# Patient Record
Sex: MALE | Race: WHITE | Employment: OTHER | URBAN - NONMETROPOLITAN AREA
[De-identification: names, ages, dates, MRNs, and addresses within clinical notes are randomized per-mention and may not be internally consistent; named-entity substitution may affect disease eponyms.]

---

## 2018-10-11 LAB
BILIRUBIN URINE: ABNORMAL MG/DL
BLOOD, URINE: ABNORMAL
CLARITY: ABNORMAL
COLOR: ABNORMAL
GLUCOSE URINE: NEGATIVE
KETONES, URINE: ABNORMAL
LEUKOCYTE ESTERASE, URINE: ABNORMAL
NITRITE, URINE: NEGATIVE
PH UA: 6 (ref 4.5–8)
PROTEIN UA: ABNORMAL
SPECIFIC GRAVITY UA: 1.02 (ref 1–1.03)
UROBILINOGEN, URINE: ABNORMAL

## 2018-10-29 LAB
BILIRUBIN URINE: ABNORMAL MG/DL
BLOOD, URINE: ABNORMAL
CLARITY: ABNORMAL
COLOR: YELLOW
GLUCOSE URINE: NEGATIVE
KETONES, URINE: NEGATIVE
LEUKOCYTE ESTERASE, URINE: ABNORMAL
NITRITE, URINE: NEGATIVE
PH UA: 6.5 (ref 4.5–8)
PROTEIN UA: ABNORMAL
SPECIFIC GRAVITY UA: 1.02 (ref 1–1.03)
UROBILINOGEN, URINE: NORMAL

## 2018-11-01 LAB
BILIRUBIN URINE: NORMAL MG/DL
BLOOD, URINE: NORMAL
CLARITY: NORMAL
COLOR: YELLOW
GLUCOSE URINE: NEGATIVE
KETONES, URINE: NEGATIVE
LEUKOCYTE ESTERASE, URINE: NORMAL
NITRITE, URINE: NEGATIVE
PH UA: 5 (ref 4.5–8)
PROTEIN UA: NORMAL
SPECIFIC GRAVITY UA: 1.01 (ref 1–1.03)
UROBILINOGEN, URINE: NORMAL

## 2018-11-15 LAB
BILIRUBIN URINE: ABNORMAL MG/DL
BLOOD, URINE: ABNORMAL
CLARITY: ABNORMAL
COLOR: YELLOW
GLUCOSE URINE: NEGATIVE
KETONES, URINE: NEGATIVE
LEUKOCYTE ESTERASE, URINE: ABNORMAL
NITRITE, URINE: NEGATIVE
PH UA: 7 (ref 4.5–8)
PROTEIN UA: ABNORMAL
SPECIFIC GRAVITY UA: 1.02 (ref 1–1.03)
UROBILINOGEN, URINE: NORMAL

## 2018-12-06 ENCOUNTER — HOSPITAL ENCOUNTER (EMERGENCY)
Age: 73
Discharge: HOME | End: 2018-12-06
Payer: MEDICARE

## 2018-12-06 VITALS
RESPIRATION RATE: 18 BRPM | OXYGEN SATURATION: 95 % | SYSTOLIC BLOOD PRESSURE: 151 MMHG | HEART RATE: 97 BPM | DIASTOLIC BLOOD PRESSURE: 79 MMHG | TEMPERATURE: 98.7 F

## 2018-12-06 DIAGNOSIS — R31.0 GROSS HEMATURIA: Primary | ICD-10-CM

## 2018-12-06 LAB
BACTERIA: ABNORMAL /HPF
BILIRUBIN URINE: ABNORMAL
BLOOD, URINE: ABNORMAL
CASTS UA: ABNORMAL /LPF
CHARACTER, URINE: ABNORMAL
COLOR: ABNORMAL
CRYSTALS, UA: ABNORMAL
EPITHELIAL CELLS, UA: ABNORMAL /HPF
GLUCOSE URINE: NEGATIVE MG/DL
ICTOTEST: NEGATIVE
KETONES, URINE: ABNORMAL
LEUKOCYTE ESTERASE, URINE: ABNORMAL
NITRITE, URINE: NEGATIVE
PH UA: 6.5
PROTEIN UA: 100
RBC URINE: > 200 /HPF
SPECIFIC GRAVITY, URINE: 1.01 (ref 1–1.03)
UROBILINOGEN, URINE: 1 EU/DL
WBC UA: > 100 /HPF

## 2018-12-06 PROCEDURE — 87086 URINE CULTURE/COLONY COUNT: CPT

## 2018-12-06 PROCEDURE — 99283 EMERGENCY DEPT VISIT LOW MDM: CPT

## 2018-12-06 PROCEDURE — 81001 URINALYSIS AUTO W/SCOPE: CPT

## 2018-12-06 ASSESSMENT — ENCOUNTER SYMPTOMS
SHORTNESS OF BREATH: 0
ABDOMINAL DISTENTION: 0
COUGH: 0
DIARRHEA: 1
ABDOMINAL PAIN: 0
VOMITING: 0
RECTAL PAIN: 0
NAUSEA: 0
CONSTIPATION: 0

## 2018-12-06 NOTE — ED NOTES
Pt to rm 09 per intake c/o hematuria that has been off/on for 3 months. Pt states that he has been on 3 different antibiotics that are no longer working. Pt states he saw a urologist but didn't follow through with treatment because pt believed that the doctor just wanted to [facility] up the high dollar stuff\" by getting CT scans and bladder scopes rather than just treating  'the simple stuff' like his UTI. Pt also reports that he thinks that he could have a yeast infection but was unable to get OTC yeast medication because it was 'all for women'. Pt denies other needs or concerns at this time. UA collected and sent.  VSS- will monitor     Agueda Hathaway RN  12/06/18 [id number]

## 2018-12-07 NOTE — ED PROVIDER NOTES
[facility]  eMERGENCY dEPARTMENT eNCOUnter          [facility]       Chief Complaint   Patient presents with    Hematuria     on/off for over 3 months       Nurses Notes reviewed and I agree except as noted in the HPI. HISTORY OF PRESENT ILLNESS    Martha Wagner is a 67 y.o. male who presents to the Emergency Department for the evaluation of hematuria. This began 3 months ago and accompanied with dysuria, frequency, urgency, and burning sensation. He was given antibiotic for UTI and gross hematuria cleared. He has been seen by urology who recommends cystoscopy and CT. He does not want to do this testing because it is invasive and wants the \"minor\" fixes and believes he has a yeast infection that requires treatment. He was seen by his family nurse practitioner today who obtained a urine sample and sent for culture. He arrived at the ED because his gross hematuria returned today. He wants treatment with nystatin and will refuses to return to the urologist. He is aware that his symptoms may relate to bladder or kidney cancer. REVIEW OF SYSTEMS     Review of Systems   Constitutional: Positive for unexpected weight change (2 pounds increase in 1 month). Negative for activity change, appetite change, chills, diaphoresis, fatigue and fever. Respiratory: Negative for cough and shortness of breath. Cardiovascular: Negative for chest pain, palpitations and leg swelling. Gastrointestinal: Positive for diarrhea. Negative for abdominal distention, abdominal pain, constipation, nausea, rectal pain and vomiting. Genitourinary: Positive for dysuria, frequency, hematuria and urgency. Negative for decreased urine volume, difficulty urinating, discharge, flank pain, genital sores, penile pain, penile swelling, scrotal swelling and testicular pain. PAST MEDICAL HISTORY    has a past medical history of Diabetes mellitus ([address] Hyperlipidemia; and Thyroid disease.     SURGICAL HISTORY

## 2018-12-08 LAB — URINE CULTURE REFLEX: NORMAL

## 2018-12-10 ENCOUNTER — TELEPHONE (OUTPATIENT)
Dept: UROLOGY | Age: 73
End: 2018-12-10

## 2018-12-10 NOTE — TELEPHONE ENCOUNTER
Called PCP Dennis Sales and requested for her to order a CT Urogram since there was no imaging done. Once ordered we will schedule NP appointment.

## 2018-12-13 ENCOUNTER — HOSPITAL ENCOUNTER (OUTPATIENT)
Dept: CT IMAGING | Age: 73
Discharge: HOME | End: 2018-12-13
Payer: MEDICARE

## 2018-12-13 DIAGNOSIS — R31.9 HEMATURIA, UNSPECIFIED TYPE: ICD-10-CM

## 2018-12-13 LAB — POC CREATININE WHOLE BLOOD: 0.8 MG/DL (ref 0.5–1.2)

## 2018-12-13 PROCEDURE — 82565 ASSAY OF CREATININE: CPT

## 2018-12-13 PROCEDURE — 74178 CT ABD&PLV WO CNTR FLWD CNTR: CPT

## 2018-12-13 PROCEDURE — 6360000004 HC RX CONTRAST MEDICATION: Performed by: NURSE PRACTITIONER

## 2018-12-13 RX ADMIN — IOPAMIDOL 85 ML: 755 INJECTION, SOLUTION INTRAVENOUS at 12:34

## 2018-12-13 RX ADMIN — IOHEXOL 50 ML: 240 INJECTION, SOLUTION INTRATHECAL; INTRAVASCULAR; INTRAVENOUS; ORAL at 12:34

## 2018-12-20 ENCOUNTER — OFFICE VISIT (OUTPATIENT)
Dept: UROLOGY | Age: 73
End: 2018-12-20
Payer: MEDICARE

## 2018-12-20 VITALS
WEIGHT: 204 LBS | BODY MASS INDEX: 32.02 KG/M2 | HEIGHT: 67 IN | DIASTOLIC BLOOD PRESSURE: 64 MMHG | SYSTOLIC BLOOD PRESSURE: 138 MMHG

## 2018-12-20 DIAGNOSIS — Z01.818 PRE-OP TESTING: ICD-10-CM

## 2018-12-20 DIAGNOSIS — R05.9 COUGH: ICD-10-CM

## 2018-12-20 DIAGNOSIS — N21.0 BLADDER STONE: ICD-10-CM

## 2018-12-20 DIAGNOSIS — R10.9 FLANK PAIN: ICD-10-CM

## 2018-12-20 DIAGNOSIS — R39.198 DIFFICULTY URINATING: ICD-10-CM

## 2018-12-20 DIAGNOSIS — E78.5 HYPERLIPIDEMIA, UNSPECIFIED HYPERLIPIDEMIA TYPE: ICD-10-CM

## 2018-12-20 DIAGNOSIS — R31.0 GROSS HEMATURIA: Primary | ICD-10-CM

## 2018-12-20 LAB
BILIRUBIN URINE: NEGATIVE
BLOOD URINE, POC: ABNORMAL
CHARACTER, URINE: CLEAR
COLOR, URINE: ABNORMAL
GLUCOSE URINE: NEGATIVE MG/DL
KETONES, URINE: NEGATIVE
LEUKOCYTE CLUMPS, URINE: ABNORMAL
NITRITE, URINE: NEGATIVE
PH, URINE: 6
PROTEIN, URINE: 30 MG/DL
SPECIFIC GRAVITY, URINE: 1.02 (ref 1–1.03)
UROBILINOGEN, URINE: 0.2 EU/DL

## 2018-12-20 PROCEDURE — 99204 OFFICE O/P NEW MOD 45 MIN: CPT | Performed by: UROLOGY

## 2018-12-20 PROCEDURE — 1123F ACP DISCUSS/DSCN MKR DOCD: CPT | Performed by: UROLOGY

## 2018-12-20 PROCEDURE — 4004F PT TOBACCO SCREEN RCVD TLK: CPT | Performed by: UROLOGY

## 2018-12-20 PROCEDURE — G8417 CALC BMI ABV UP PARAM F/U: HCPCS | Performed by: UROLOGY

## 2018-12-20 PROCEDURE — 3017F COLORECTAL CA SCREEN DOC REV: CPT | Performed by: UROLOGY

## 2018-12-20 PROCEDURE — G8484 FLU IMMUNIZE NO ADMIN: HCPCS | Performed by: UROLOGY

## 2018-12-20 PROCEDURE — 1101F PT FALLS ASSESS-DOCD LE1/YR: CPT | Performed by: UROLOGY

## 2018-12-20 PROCEDURE — 52000 CYSTOURETHROSCOPY: CPT | Performed by: UROLOGY

## 2018-12-20 PROCEDURE — G8427 DOCREV CUR MEDS BY ELIG CLIN: HCPCS | Performed by: UROLOGY

## 2018-12-20 PROCEDURE — 81003 URINALYSIS AUTO W/O SCOPE: CPT | Performed by: UROLOGY

## 2018-12-20 PROCEDURE — 4040F PNEUMOC VAC/ADMIN/RCVD: CPT | Performed by: UROLOGY

## 2018-12-20 RX ORDER — ATORVASTATIN CALCIUM 40 MG/1
40 TABLET, FILM COATED ORAL DAILY
COMMUNITY

## 2018-12-20 RX ORDER — MAG HYDROX/ALUMINUM HYD/SIMETH 400-400-40
SUSPENSION, ORAL (FINAL DOSE FORM) ORAL DAILY
COMMUNITY

## 2018-12-20 RX ORDER — PREDNISOLONE ACETATE 10 MG/ML
1 SUSPENSION/ DROPS OPHTHALMIC 4 TIMES DAILY
COMMUNITY

## 2018-12-20 RX ORDER — BRIMONIDINE TARTRATE 2 MG/ML
1 SOLUTION/ DROPS OPHTHALMIC 3 TIMES DAILY
COMMUNITY

## 2018-12-20 RX ORDER — PIOGLITAZONEHYDROCHLORIDE 30 MG/1
30 TABLET ORAL DAILY
COMMUNITY
End: 2019-01-23 | Stop reason: ALTCHOICE

## 2018-12-20 RX ORDER — LEVOTHYROXINE SODIUM 0.12 MG/1
125 TABLET ORAL DAILY
COMMUNITY

## 2018-12-20 RX ORDER — CYCLOPENTOLATE HYDROCHLORIDE 10 MG/ML
1 SOLUTION/ DROPS OPHTHALMIC ONCE
COMMUNITY

## 2018-12-20 ASSESSMENT — ENCOUNTER SYMPTOMS
BLOOD IN STOOL: 0
BACK PAIN: 1
CHEST TIGHTNESS: 0
EYE DISCHARGE: 0
COUGH: 1
EYE PAIN: 0
ABDOMINAL PAIN: 0

## 2018-12-20 NOTE — PROGRESS NOTES
calcification mid right kidney probable cyst lower pole right kidney    Lung bases   There is an intrathoracic stomach due to either a large Bochdalek hernia or diaphragmatic rupture. Somewhat favor a Bochdalek hernia. Only the stomach is seen in there is a focal gap in the diaphragm measuring 4.3 cm. Minimal coronary artery calcifications are noted. No infiltrates or effusions are seen. Abdomen pelvis   Liver, and spleen are normal.   Pancreas is normal.   Gallbladder is normal.   Adrenals are normal.   Kidneys enhance symmetrically   There is a cyst in the lower pole the right kidney compatible with a Bosniak type I cyst. The calcification is nonobstructive.       Aorta is atherosclerotic with calcific and soft plaque. No adenopathy is seen. There is extensive sigmoid diverticulosis. The right ureter is followed to the level of the bladder it is displaced posteriorly by a bladder diverticulum which contains a solid enhancing soft tissue mass in addition to a large calcification. Measures 23 mm calcification. The right lateral and    posterior bladder wall is irregularly thickened highly concerning for neoplasm. There is also a calculus in the posterior dependent portion of the bladder. Bladder wall is mildly irregular. Prostate gland is normal size and contains calcifications. Bones   There are no suspicious bone lesions           Impression   There is a right posterior lateral bladder diverticulum which contains both enhancing soft tissue as well as a large calculus. There is a 17 mm bladder calculus in addition there is irregular thickening of the right posterior lateral bladder    wall and these findings are highly suspicious for the presence of neoplasia           Cystoscopy  After obtaining informed consent and prepping the urethral meatus, a 16-Turkmen flexible cystoscope was passed per urethra into the bladder.   The urethra was evaluated on the way in and then again on the way out and was found to be normal.  The prostate was moderately obstructing. The bladder was evaluated in its endoscopic entirety and found to trabeculations, a diverticulum which had a bladder stone and a bladder tumor inside of it, and another stone in the bladder. There were no ulcers or foreign bodies. There were no mucosal abnormalities. The ureteral orifices were seen and were normal.  The scope was removed. The patient tolerated the procedure and there were no complications. A dose of 500 mg ciprofloxacin was given for the procedure. Impression: Bladder trabeculations and bladder stone, on the right side there was a diverticulum with a bladder stone and bladder tumor inside. Assessment:       Diagnosis Orders   1. Gross hematuria  POCT Urinalysis No Micro (Auto)    TX CYSTOURETHROSCOPY       Mr. Menjivarypresents today in follow-up for Gross hematuria [R31.0]. CT Scan demonstrates a right posterior lateral bladder diverticulum which contains both enhancing soft tissue as well as a large calculus. There is a 17 mm bladder calculus in addition there is irregular thickening of the right posterior lateral bladder. UA today shows a large amount of blood and a small amount of leukocytes. I have reviewed all notes sent along with this referral including notes from a recent visit to his primary care provider. These records demonstrated the following past medical history:  Past Medical History:   Diagnosis Date    Diabetes mellitus ([facility] [address]     Hyperlipidemia     Thyroid disease             Plan:        Schedule TUR BT and cystolitholapaxy, needs clearance. Follow up with Shahla Redmond    I described the procedure in detail and also described the associated risks and benefits at length. We discussed possible alternative therapies. We discussed the risks and benefits of not undergoing therapy. Bernardino Perezkel understands these risks and benefits and desires to proceed.   He will be scheduled for the procedure in the very near

## 2018-12-26 ENCOUNTER — TELEPHONE (OUTPATIENT)
Dept: UROLOGY | Age: 73
End: 2018-12-26

## 2018-12-27 ENCOUNTER — HOSPITAL ENCOUNTER (OUTPATIENT)
Dept: GENERAL RADIOLOGY | Age: 73
Discharge: HOME | End: 2018-12-27
Payer: MEDICARE

## 2018-12-27 ENCOUNTER — HOSPITAL ENCOUNTER (OUTPATIENT)
Age: 73
Discharge: HOME | End: 2018-12-27
Payer: MEDICARE

## 2018-12-27 DIAGNOSIS — E78.5 HYPERLIPIDEMIA, UNSPECIFIED HYPERLIPIDEMIA TYPE: ICD-10-CM

## 2018-12-27 DIAGNOSIS — R39.198 DIFFICULTY URINATING: ICD-10-CM

## 2018-12-27 DIAGNOSIS — R31.0 GROSS HEMATURIA: ICD-10-CM

## 2018-12-27 DIAGNOSIS — Z01.818 PRE-OP TESTING: ICD-10-CM

## 2018-12-27 DIAGNOSIS — R05.9 COUGH: ICD-10-CM

## 2018-12-27 DIAGNOSIS — N21.0 BLADDER STONE: ICD-10-CM

## 2018-12-27 DIAGNOSIS — R10.9 FLANK PAIN: ICD-10-CM

## 2018-12-27 LAB
ANION GAP SERPL CALCULATED.3IONS-SCNC: 11 MEQ/L (ref 8–16)
BASOPHILS # BLD: 0.4 %
BASOPHILS ABSOLUTE: 0 THOU/MM3 (ref 0–0.1)
BUN BLDV-MCNC: 12 MG/DL (ref 7–22)
CALCIUM SERPL-MCNC: 9.5 MG/DL (ref 8.5–10.5)
CHLORIDE BLD-SCNC: 103 MEQ/L (ref 98–111)
CO2: 30 MEQ/L (ref 23–33)
CREAT SERPL-MCNC: 0.7 MG/DL (ref 0.4–1.2)
EKG ATRIAL RATE: 75 BPM
EKG P AXIS: 70 DEGREES
EKG P-R INTERVAL: 172 MS
EKG Q-T INTERVAL: 404 MS
EKG QRS DURATION: 86 MS
EKG QTC CALCULATION (BAZETT): 451 MS
EKG R AXIS: 38 DEGREES
EKG T AXIS: 37 DEGREES
EKG VENTRICULAR RATE: 75 BPM
EOSINOPHIL # BLD: 1.3 %
EOSINOPHILS ABSOLUTE: 0.1 THOU/MM3 (ref 0–0.4)
ERYTHROCYTE [DISTWIDTH] IN BLOOD BY AUTOMATED COUNT: 13.9 % (ref 11.5–14.5)
ERYTHROCYTE [DISTWIDTH] IN BLOOD BY AUTOMATED COUNT: 47.4 FL (ref 35–45)
GFR SERPL CREATININE-BSD FRML MDRD: > 90 ML/MIN/1.73M2
GLUCOSE BLD-MCNC: 135 MG/DL (ref 70–108)
HCT VFR BLD CALC: 45.9 % (ref 42–52)
HEMOGLOBIN: 14.9 GM/DL (ref 14–18)
IMMATURE GRANS (ABS): 0.04 THOU/MM3 (ref 0–0.07)
IMMATURE GRANULOCYTES: 0.5 %
LYMPHOCYTES # BLD: 20.3 %
LYMPHOCYTES ABSOLUTE: 1.7 THOU/MM3 (ref 1–4.8)
MCH RBC QN AUTO: 30.5 PG (ref 26–33)
MCHC RBC AUTO-ENTMCNC: 32.5 GM/DL (ref 32.2–35.5)
MCV RBC AUTO: 93.9 FL (ref 80–94)
MONOCYTES # BLD: 7.5 %
MONOCYTES ABSOLUTE: 0.6 THOU/MM3 (ref 0.4–1.3)
NUCLEATED RED BLOOD CELLS: 0 /100 WBC
PLATELET # BLD: 207 THOU/MM3 (ref 130–400)
PMV BLD AUTO: 9.9 FL (ref 9.4–12.4)
POTASSIUM SERPL-SCNC: 4.1 MEQ/L (ref 3.5–5.2)
RBC # BLD: 4.89 MILL/MM3 (ref 4.7–6.1)
SEG NEUTROPHILS: 70 %
SEGMENTED NEUTROPHILS ABSOLUTE COUNT: 5.8 THOU/MM3 (ref 1.8–7.7)
SODIUM BLD-SCNC: 144 MEQ/L (ref 135–145)
WBC # BLD: 8.3 THOU/MM3 (ref 4.8–10.8)

## 2018-12-27 PROCEDURE — 80048 BASIC METABOLIC PNL TOTAL CA: CPT

## 2018-12-27 PROCEDURE — 36415 COLL VENOUS BLD VENIPUNCTURE: CPT

## 2018-12-27 PROCEDURE — 93010 ELECTROCARDIOGRAM REPORT: CPT | Performed by: INTERNAL MEDICINE

## 2018-12-27 PROCEDURE — 85025 COMPLETE CBC W/AUTO DIFF WBC: CPT

## 2018-12-27 PROCEDURE — 71046 X-RAY EXAM CHEST 2 VIEWS: CPT

## 2018-12-27 PROCEDURE — 93005 ELECTROCARDIOGRAM TRACING: CPT

## 2019-01-04 ENCOUNTER — HOSPITAL ENCOUNTER (OUTPATIENT)
Dept: CT IMAGING | Age: 74
Discharge: HOME | End: 2019-01-04
Payer: MEDICARE

## 2019-01-04 DIAGNOSIS — R93.89 ABNORMAL CHEST X-RAY: ICD-10-CM

## 2019-01-04 PROCEDURE — 71260 CT THORAX DX C+: CPT

## 2019-01-04 PROCEDURE — 6360000004 HC RX CONTRAST MEDICATION: Performed by: NURSE PRACTITIONER

## 2019-01-04 RX ADMIN — IOPAMIDOL 85 ML: 755 INJECTION, SOLUTION INTRAVENOUS at 07:03

## 2019-01-08 ENCOUNTER — TELEPHONE (OUTPATIENT)
Dept: UROLOGY | Age: 74
End: 2019-01-08

## 2019-01-09 ENCOUNTER — TELEPHONE (OUTPATIENT)
Dept: UROLOGY | Age: 74
End: 2019-01-09

## 2019-01-21 ENCOUNTER — HOSPITAL ENCOUNTER (OUTPATIENT)
Age: 74
Discharge: HOME | End: 2019-01-21
Payer: MEDICARE

## 2019-01-21 LAB
BACTERIA: ABNORMAL /HPF
BILIRUBIN URINE: NEGATIVE
BLOOD, URINE: ABNORMAL
CASTS 2: ABNORMAL /LPF
CASTS UA: ABNORMAL /LPF
CHARACTER, URINE: ABNORMAL
COLOR: ABNORMAL
CRYSTALS, UA: ABNORMAL
EPITHELIAL CELLS, UA: ABNORMAL /HPF
GLUCOSE URINE: NEGATIVE MG/DL
KETONES, URINE: NEGATIVE
LEUKOCYTE ESTERASE, URINE: ABNORMAL
MISCELLANEOUS 2: ABNORMAL
NITRITE, URINE: NEGATIVE
PH UA: 6
PROTEIN UA: 100
RBC URINE: ABNORMAL /HPF
RENAL EPITHELIAL, UA: ABNORMAL
SPECIFIC GRAVITY, URINE: 1.02 (ref 1–1.03)
UROBILINOGEN, URINE: 1 EU/DL
WBC UA: > 200 /HPF
YEAST: ABNORMAL

## 2019-01-21 PROCEDURE — 81001 URINALYSIS AUTO W/SCOPE: CPT

## 2019-01-21 PROCEDURE — 87086 URINE CULTURE/COLONY COUNT: CPT

## 2019-01-23 LAB — URINE CULTURE REFLEX: NORMAL

## 2019-01-30 ENCOUNTER — ANESTHESIA (OUTPATIENT)
Dept: OPERATING ROOM | Age: 74
End: 2019-01-30
Payer: MEDICARE

## 2019-01-30 ENCOUNTER — HOSPITAL ENCOUNTER (OUTPATIENT)
Age: 74
Discharge: HOME | End: 2019-01-31
Attending: UROLOGY | Admitting: UROLOGY
Payer: MEDICARE

## 2019-01-30 ENCOUNTER — ANESTHESIA EVENT (OUTPATIENT)
Dept: OPERATING ROOM | Age: 74
End: 2019-01-30
Payer: MEDICARE

## 2019-01-30 VITALS
DIASTOLIC BLOOD PRESSURE: 74 MMHG | TEMPERATURE: 98.6 F | RESPIRATION RATE: 2 BRPM | SYSTOLIC BLOOD PRESSURE: 152 MMHG | OXYGEN SATURATION: 98 %

## 2019-01-30 PROBLEM — D49.4 BLADDER TUMOR: Status: ACTIVE | Noted: 2019-01-30

## 2019-01-30 PROBLEM — Z98.890 POSTOPERATIVE STATE: Status: ACTIVE | Noted: 2019-01-30

## 2019-01-30 LAB
GLUCOSE BLD-MCNC: 111 MG/DL (ref 70–108)
GLUCOSE BLD-MCNC: 118 MG/DL (ref 70–108)

## 2019-01-30 PROCEDURE — 82948 REAGENT STRIP/BLOOD GLUCOSE: CPT

## 2019-01-30 PROCEDURE — 82365 CALCULUS SPECTROSCOPY: CPT

## 2019-01-30 PROCEDURE — 2720000010 HC SURG SUPPLY STERILE: Performed by: UROLOGY

## 2019-01-30 PROCEDURE — 2709999900 HC NON-CHARGEABLE SUPPLY

## 2019-01-30 PROCEDURE — 2580000003 HC RX 258

## 2019-01-30 PROCEDURE — 2500000003 HC RX 250 WO HCPCS: Performed by: NURSE ANESTHETIST, CERTIFIED REGISTERED

## 2019-01-30 PROCEDURE — 6360000002 HC RX W HCPCS

## 2019-01-30 PROCEDURE — 3600000003 HC SURGERY LEVEL 3 BASE: Performed by: UROLOGY

## 2019-01-30 PROCEDURE — 7100000000 HC PACU RECOVERY - FIRST 15 MIN: Performed by: UROLOGY

## 2019-01-30 PROCEDURE — 3700000001 HC ADD 15 MINUTES (ANESTHESIA): Performed by: UROLOGY

## 2019-01-30 PROCEDURE — 2709999900 HC NON-CHARGEABLE SUPPLY: Performed by: UROLOGY

## 2019-01-30 PROCEDURE — 52318 REMOVE BLADDER STONE: CPT | Performed by: UROLOGY

## 2019-01-30 PROCEDURE — C1769 GUIDE WIRE: HCPCS | Performed by: UROLOGY

## 2019-01-30 PROCEDURE — 6370000000 HC RX 637 (ALT 250 FOR IP): Performed by: NURSE PRACTITIONER

## 2019-01-30 PROCEDURE — 1200000000 HC SEMI PRIVATE

## 2019-01-30 PROCEDURE — 6360000002 HC RX W HCPCS: Performed by: NURSE ANESTHETIST, CERTIFIED REGISTERED

## 2019-01-30 PROCEDURE — 52240 CYSTOSCOPY AND TREATMENT: CPT | Performed by: UROLOGY

## 2019-01-30 PROCEDURE — 7100000001 HC PACU RECOVERY - ADDTL 15 MIN: Performed by: UROLOGY

## 2019-01-30 PROCEDURE — 3700000000 HC ANESTHESIA ATTENDED CARE: Performed by: UROLOGY

## 2019-01-30 PROCEDURE — 3600000013 HC SURGERY LEVEL 3 ADDTL 15MIN: Performed by: UROLOGY

## 2019-01-30 PROCEDURE — 88307 TISSUE EXAM BY PATHOLOGIST: CPT

## 2019-01-30 RX ORDER — CIPROFLOXACIN 500 MG/1
500 TABLET, FILM COATED ORAL 2 TIMES DAILY
Qty: 10 TABLET | Refills: 0 | Status: SHIPPED | OUTPATIENT
Start: 2019-01-30 | End: 2019-02-04

## 2019-01-30 RX ORDER — ONDANSETRON 2 MG/ML
INJECTION INTRAMUSCULAR; INTRAVENOUS PRN
Status: DISCONTINUED | OUTPATIENT
Start: 2019-01-30 | End: 2019-01-30 | Stop reason: SDUPTHER

## 2019-01-30 RX ORDER — LIDOCAINE HYDROCHLORIDE 20 MG/ML
INJECTION, SOLUTION INFILTRATION; PERINEURAL PRN
Status: DISCONTINUED | OUTPATIENT
Start: 2019-01-30 | End: 2019-01-30 | Stop reason: SDUPTHER

## 2019-01-30 RX ORDER — ACETAMINOPHEN 500 MG
1000 TABLET ORAL EVERY 6 HOURS PRN
Status: DISCONTINUED | OUTPATIENT
Start: 2019-01-30 | End: 2019-01-31 | Stop reason: HOSPADM

## 2019-01-30 RX ORDER — OXYCODONE HYDROCHLORIDE AND ACETAMINOPHEN 5; 325 MG/1; MG/1
1 TABLET ORAL EVERY 4 HOURS PRN
Status: DISCONTINUED | OUTPATIENT
Start: 2019-01-30 | End: 2019-01-31 | Stop reason: HOSPADM

## 2019-01-30 RX ORDER — OXYCODONE HYDROCHLORIDE AND ACETAMINOPHEN 5; 325 MG/1; MG/1
2 TABLET ORAL EVERY 4 HOURS PRN
Status: DISCONTINUED | OUTPATIENT
Start: 2019-01-30 | End: 2019-01-31 | Stop reason: HOSPADM

## 2019-01-30 RX ORDER — DEXAMETHASONE SODIUM PHOSPHATE 4 MG/ML
INJECTION, SOLUTION INTRA-ARTICULAR; INTRALESIONAL; INTRAMUSCULAR; INTRAVENOUS; SOFT TISSUE PRN
Status: DISCONTINUED | OUTPATIENT
Start: 2019-01-30 | End: 2019-01-30 | Stop reason: SDUPTHER

## 2019-01-30 RX ORDER — SODIUM CHLORIDE 9 MG/ML
INJECTION, SOLUTION INTRAVENOUS CONTINUOUS
Status: DISCONTINUED | OUTPATIENT
Start: 2019-01-30 | End: 2019-01-31 | Stop reason: HOSPADM

## 2019-01-30 RX ORDER — ONDANSETRON 2 MG/ML
4 INJECTION INTRAMUSCULAR; INTRAVENOUS EVERY 4 HOURS PRN
Status: DISCONTINUED | OUTPATIENT
Start: 2019-01-30 | End: 2019-01-31 | Stop reason: HOSPADM

## 2019-01-30 RX ORDER — OXYBUTYNIN CHLORIDE 5 MG/1
5 TABLET ORAL 3 TIMES DAILY PRN
Qty: 30 TABLET | Refills: 0 | Status: SHIPPED | OUTPATIENT
Start: 2019-01-30 | End: 2019-04-29 | Stop reason: ALTCHOICE

## 2019-01-30 RX ORDER — SODIUM CHLORIDE 9 MG/ML
INJECTION, SOLUTION INTRAVENOUS CONTINUOUS PRN
Status: DISCONTINUED | OUTPATIENT
Start: 2019-01-30 | End: 2019-01-30

## 2019-01-30 RX ORDER — ACETAMINOPHEN 500 MG
1000 TABLET ORAL EVERY 6 HOURS PRN
Status: DISCONTINUED | OUTPATIENT
Start: 2019-01-30 | End: 2019-01-30

## 2019-01-30 RX ORDER — FENTANYL CITRATE 50 UG/ML
INJECTION, SOLUTION INTRAMUSCULAR; INTRAVENOUS PRN
Status: DISCONTINUED | OUTPATIENT
Start: 2019-01-30 | End: 2019-01-30 | Stop reason: SDUPTHER

## 2019-01-30 RX ORDER — PROPOFOL 10 MG/ML
INJECTION, EMULSION INTRAVENOUS PRN
Status: DISCONTINUED | OUTPATIENT
Start: 2019-01-30 | End: 2019-01-30 | Stop reason: SDUPTHER

## 2019-01-30 RX ORDER — ATROPA BELLADONNA AND OPIUM 16.2; 3 MG/1; MG/1
30 SUPPOSITORY RECTAL EVERY 8 HOURS PRN
Status: DISCONTINUED | OUTPATIENT
Start: 2019-01-30 | End: 2019-01-31 | Stop reason: HOSPADM

## 2019-01-30 RX ORDER — PHENAZOPYRIDINE HYDROCHLORIDE 100 MG/1
100 TABLET, FILM COATED ORAL 3 TIMES DAILY PRN
Qty: 12 TABLET | Refills: 0 | Status: SHIPPED | OUTPATIENT
Start: 2019-01-30 | End: 2019-04-29 | Stop reason: ALTCHOICE

## 2019-01-30 RX ORDER — CIPROFLOXACIN 2 MG/ML
400 INJECTION, SOLUTION INTRAVENOUS
Status: COMPLETED | OUTPATIENT
Start: 2019-01-30 | End: 2019-01-30

## 2019-01-30 RX ADMIN — FENTANYL CITRATE 50 MCG: 50 INJECTION INTRAMUSCULAR; INTRAVENOUS at 17:57

## 2019-01-30 RX ADMIN — PROPOFOL 150 MG: 10 INJECTION, EMULSION INTRAVENOUS at 17:12

## 2019-01-30 RX ADMIN — LIDOCAINE HYDROCHLORIDE 80 MG: 20 INJECTION, SOLUTION INFILTRATION; PERINEURAL at 17:12

## 2019-01-30 RX ADMIN — FENTANYL CITRATE 100 MCG: 50 INJECTION INTRAMUSCULAR; INTRAVENOUS at 17:12

## 2019-01-30 RX ADMIN — PROPOFOL 50 MG: 10 INJECTION, EMULSION INTRAVENOUS at 17:22

## 2019-01-30 RX ADMIN — PROPOFOL 50 MG: 10 INJECTION, EMULSION INTRAVENOUS at 17:40

## 2019-01-30 RX ADMIN — PROPOFOL 50 MG: 10 INJECTION, EMULSION INTRAVENOUS at 17:20

## 2019-01-30 RX ADMIN — DEXAMETHASONE SODIUM PHOSPHATE 8 MG: 4 INJECTION, SOLUTION INTRAMUSCULAR; INTRAVENOUS at 17:19

## 2019-01-30 RX ADMIN — FENTANYL CITRATE 50 MCG: 50 INJECTION INTRAMUSCULAR; INTRAVENOUS at 17:41

## 2019-01-30 RX ADMIN — ONDANSETRON HYDROCHLORIDE 4 MG: 4 INJECTION, SOLUTION INTRAMUSCULAR; INTRAVENOUS at 17:19

## 2019-01-30 RX ADMIN — SODIUM CHLORIDE: 9 INJECTION, SOLUTION INTRAVENOUS at 22:29

## 2019-01-30 RX ADMIN — CIPROFLOXACIN 400 MG: 2 INJECTION, SOLUTION INTRAVENOUS at 17:18

## 2019-01-30 RX ADMIN — SODIUM CHLORIDE: 9 INJECTION, SOLUTION INTRAVENOUS at 14:47

## 2019-01-30 RX ADMIN — ATROPA BELLADONNA AND OPIUM 30 MG: 16.2; 3 SUPPOSITORY RECTAL at 19:37

## 2019-01-30 RX ADMIN — SODIUM CHLORIDE: 9 INJECTION, SOLUTION INTRAVENOUS at 17:35

## 2019-01-30 ASSESSMENT — PULMONARY FUNCTION TESTS
PIF_VALUE: 1
PIF_VALUE: 17
PIF_VALUE: 14
PIF_VALUE: 3
PIF_VALUE: 13
PIF_VALUE: 2
PIF_VALUE: 2
PIF_VALUE: 16
PIF_VALUE: 13
PIF_VALUE: 19
PIF_VALUE: 12
PIF_VALUE: 13
PIF_VALUE: 2
PIF_VALUE: 13
PIF_VALUE: 3
PIF_VALUE: 13
PIF_VALUE: 14
PIF_VALUE: 4
PIF_VALUE: 5
PIF_VALUE: 13
PIF_VALUE: 3
PIF_VALUE: 4
PIF_VALUE: 3
PIF_VALUE: 1
PIF_VALUE: 3
PIF_VALUE: 3
PIF_VALUE: 2
PIF_VALUE: 15
PIF_VALUE: 14
PIF_VALUE: 3
PIF_VALUE: 13
PIF_VALUE: 16
PIF_VALUE: 3
PIF_VALUE: 4
PIF_VALUE: 1
PIF_VALUE: 3
PIF_VALUE: 3
PIF_VALUE: 13
PIF_VALUE: 13
PIF_VALUE: 14
PIF_VALUE: 1
PIF_VALUE: 2
PIF_VALUE: 3
PIF_VALUE: 4
PIF_VALUE: 3
PIF_VALUE: 3
PIF_VALUE: 13
PIF_VALUE: 1
PIF_VALUE: 3
PIF_VALUE: 3
PIF_VALUE: 4
PIF_VALUE: 3
PIF_VALUE: 13
PIF_VALUE: 3
PIF_VALUE: 13
PIF_VALUE: 3
PIF_VALUE: 14
PIF_VALUE: 3
PIF_VALUE: 2
PIF_VALUE: 3
PIF_VALUE: 3
PIF_VALUE: 13
PIF_VALUE: 16
PIF_VALUE: 12
PIF_VALUE: 4
PIF_VALUE: 3
PIF_VALUE: 3
PIF_VALUE: 2
PIF_VALUE: 3
PIF_VALUE: 2
PIF_VALUE: 14
PIF_VALUE: 13
PIF_VALUE: 3
PIF_VALUE: 2
PIF_VALUE: 13
PIF_VALUE: 13
PIF_VALUE: 15
PIF_VALUE: 13
PIF_VALUE: 3
PIF_VALUE: 2
PIF_VALUE: 4
PIF_VALUE: 13
PIF_VALUE: 3
PIF_VALUE: 5
PIF_VALUE: 3
PIF_VALUE: 13
PIF_VALUE: 3
PIF_VALUE: 1
PIF_VALUE: 16
PIF_VALUE: 3
PIF_VALUE: 16
PIF_VALUE: 2
PIF_VALUE: 3
PIF_VALUE: 2
PIF_VALUE: 13
PIF_VALUE: 12
PIF_VALUE: 2
PIF_VALUE: 2
PIF_VALUE: 14
PIF_VALUE: 3
PIF_VALUE: 3
PIF_VALUE: 15
PIF_VALUE: 13
PIF_VALUE: 12
PIF_VALUE: 2
PIF_VALUE: 15
PIF_VALUE: 14
PIF_VALUE: 3
PIF_VALUE: 13
PIF_VALUE: 15

## 2019-01-30 ASSESSMENT — PAIN SCALES - GENERAL
PAINLEVEL_OUTOF10: 0
PAINLEVEL_OUTOF10: 2

## 2019-01-30 ASSESSMENT — PAIN DESCRIPTION - DESCRIPTORS: DESCRIPTORS: DISCOMFORT

## 2019-01-30 ASSESSMENT — PAIN DESCRIPTION - FREQUENCY: FREQUENCY: CONTINUOUS

## 2019-01-30 ASSESSMENT — PAIN DESCRIPTION - LOCATION: LOCATION: PENIS

## 2019-01-31 VITALS
DIASTOLIC BLOOD PRESSURE: 59 MMHG | SYSTOLIC BLOOD PRESSURE: 129 MMHG | RESPIRATION RATE: 16 BRPM | BODY MASS INDEX: 31.27 KG/M2 | TEMPERATURE: 98.9 F | OXYGEN SATURATION: 92 % | HEART RATE: 66 BPM | WEIGHT: 199.2 LBS | HEIGHT: 67 IN

## 2019-01-31 LAB
ANION GAP SERPL CALCULATED.3IONS-SCNC: 14 MEQ/L (ref 8–16)
BASOPHILS # BLD: 0.1 %
BASOPHILS ABSOLUTE: 0 THOU/MM3 (ref 0–0.1)
BUN BLDV-MCNC: 9 MG/DL (ref 7–22)
CALCIUM SERPL-MCNC: 8.1 MG/DL (ref 8.5–10.5)
CHLORIDE BLD-SCNC: 107 MEQ/L (ref 98–111)
CO2: 22 MEQ/L (ref 23–33)
CREAT SERPL-MCNC: 0.7 MG/DL (ref 0.4–1.2)
EOSINOPHIL # BLD: 0 %
EOSINOPHILS ABSOLUTE: 0 THOU/MM3 (ref 0–0.4)
ERYTHROCYTE [DISTWIDTH] IN BLOOD BY AUTOMATED COUNT: 13.4 % (ref 11.5–14.5)
ERYTHROCYTE [DISTWIDTH] IN BLOOD BY AUTOMATED COUNT: 45.8 FL (ref 35–45)
GFR SERPL CREATININE-BSD FRML MDRD: > 90 ML/MIN/1.73M2
GLUCOSE BLD-MCNC: 126 MG/DL (ref 70–108)
GLUCOSE BLD-MCNC: 132 MG/DL (ref 70–108)
GLUCOSE BLD-MCNC: 148 MG/DL (ref 70–108)
HCT VFR BLD CALC: 41 % (ref 42–52)
HEMOGLOBIN: 13.3 GM/DL (ref 14–18)
IMMATURE GRANS (ABS): 0.04 THOU/MM3 (ref 0–0.07)
IMMATURE GRANULOCYTES: 0.4 %
LYMPHOCYTES # BLD: 10.2 %
LYMPHOCYTES ABSOLUTE: 1.1 THOU/MM3 (ref 1–4.8)
MCH RBC QN AUTO: 30.2 PG (ref 26–33)
MCHC RBC AUTO-ENTMCNC: 32.4 GM/DL (ref 32.2–35.5)
MCV RBC AUTO: 93.2 FL (ref 80–94)
MONOCYTES # BLD: 5.3 %
MONOCYTES ABSOLUTE: 0.5 THOU/MM3 (ref 0.4–1.3)
NUCLEATED RED BLOOD CELLS: 0 /100 WBC
PLATELET # BLD: 168 THOU/MM3 (ref 130–400)
PMV BLD AUTO: 9.6 FL (ref 9.4–12.4)
POTASSIUM SERPL-SCNC: 4.2 MEQ/L (ref 3.5–5.2)
RBC # BLD: 4.4 MILL/MM3 (ref 4.7–6.1)
SEG NEUTROPHILS: 84 %
SEGMENTED NEUTROPHILS ABSOLUTE COUNT: 8.7 THOU/MM3 (ref 1.8–7.7)
SODIUM BLD-SCNC: 143 MEQ/L (ref 135–145)
WBC # BLD: 10.3 THOU/MM3 (ref 4.8–10.8)

## 2019-01-31 PROCEDURE — 80048 BASIC METABOLIC PNL TOTAL CA: CPT

## 2019-01-31 PROCEDURE — 99999 PR OFFICE/OUTPT VISIT,PROCEDURE ONLY: CPT | Performed by: NURSE PRACTITIONER

## 2019-01-31 PROCEDURE — 85025 COMPLETE CBC W/AUTO DIFF WBC: CPT

## 2019-01-31 PROCEDURE — 51700 IRRIGATION OF BLADDER: CPT

## 2019-01-31 PROCEDURE — 82948 REAGENT STRIP/BLOOD GLUCOSE: CPT

## 2019-01-31 PROCEDURE — 2580000003 HC RX 258

## 2019-01-31 PROCEDURE — 6370000000 HC RX 637 (ALT 250 FOR IP): Performed by: NURSE PRACTITIONER

## 2019-01-31 PROCEDURE — 2709999900 HC NON-CHARGEABLE SUPPLY

## 2019-01-31 PROCEDURE — 99217 PR OBSERVATION CARE DISCHARGE MANAGEMENT: CPT | Performed by: NURSE PRACTITIONER

## 2019-01-31 PROCEDURE — 36415 COLL VENOUS BLD VENIPUNCTURE: CPT

## 2019-01-31 PROCEDURE — 96360 HYDRATION IV INFUSION INIT: CPT

## 2019-01-31 PROCEDURE — 96361 HYDRATE IV INFUSION ADD-ON: CPT

## 2019-01-31 RX ORDER — CIPROFLOXACIN 500 MG/1
500 TABLET, FILM COATED ORAL EVERY 12 HOURS SCHEDULED
Status: DISCONTINUED | OUTPATIENT
Start: 2019-01-31 | End: 2019-01-31 | Stop reason: HOSPADM

## 2019-01-31 RX ADMIN — SODIUM CHLORIDE: 9 INJECTION, SOLUTION INTRAVENOUS at 08:19

## 2019-01-31 RX ADMIN — CIPROFLOXACIN 500 MG: 500 TABLET, FILM COATED ORAL at 12:56

## 2019-01-31 ASSESSMENT — PAIN DESCRIPTION - DESCRIPTORS: DESCRIPTORS: DISCOMFORT

## 2019-01-31 ASSESSMENT — PAIN DESCRIPTION - LOCATION
LOCATION: PENIS
LOCATION: PENIS

## 2019-01-31 ASSESSMENT — PAIN SCALES - GENERAL
PAINLEVEL_OUTOF10: 2

## 2019-01-31 ASSESSMENT — PAIN DESCRIPTION - FREQUENCY: FREQUENCY: CONTINUOUS

## 2019-02-01 ENCOUNTER — NURSE ONLY (OUTPATIENT)
Dept: UROLOGY | Age: 74
End: 2019-02-01

## 2019-02-01 VITALS — SYSTOLIC BLOOD PRESSURE: 118 MMHG | DIASTOLIC BLOOD PRESSURE: 60 MMHG

## 2019-02-03 LAB — STONE ANALYSIS: NORMAL

## 2019-02-12 ENCOUNTER — OFFICE VISIT (OUTPATIENT)
Dept: UROLOGY | Age: 74
End: 2019-02-12
Payer: MEDICARE

## 2019-02-12 VITALS
BODY MASS INDEX: 31.08 KG/M2 | HEIGHT: 67 IN | DIASTOLIC BLOOD PRESSURE: 66 MMHG | SYSTOLIC BLOOD PRESSURE: 122 MMHG | WEIGHT: 198 LBS

## 2019-02-12 DIAGNOSIS — R30.0 DYSURIA: ICD-10-CM

## 2019-02-12 DIAGNOSIS — C67.9 UROTHELIAL CARCINOMA OF BLADDER (HCC): Primary | ICD-10-CM

## 2019-02-12 LAB
BILIRUBIN URINE: NEGATIVE
BLOOD URINE, POC: ABNORMAL
CHARACTER, URINE: CLEAR
COLOR, URINE: YELLOW
GLUCOSE URINE: NEGATIVE MG/DL
KETONES, URINE: NEGATIVE
LEUKOCYTE CLUMPS, URINE: ABNORMAL
NITRITE, URINE: NEGATIVE
PH, URINE: 6
PROTEIN, URINE: 100 MG/DL
SPECIFIC GRAVITY, URINE: 1.02 (ref 1–1.03)
UROBILINOGEN, URINE: 0.2 EU/DL

## 2019-02-12 PROCEDURE — 4040F PNEUMOC VAC/ADMIN/RCVD: CPT | Performed by: NURSE PRACTITIONER

## 2019-02-12 PROCEDURE — G8427 DOCREV CUR MEDS BY ELIG CLIN: HCPCS | Performed by: NURSE PRACTITIONER

## 2019-02-12 PROCEDURE — G8417 CALC BMI ABV UP PARAM F/U: HCPCS | Performed by: NURSE PRACTITIONER

## 2019-02-12 PROCEDURE — 1101F PT FALLS ASSESS-DOCD LE1/YR: CPT | Performed by: NURSE PRACTITIONER

## 2019-02-12 PROCEDURE — 1123F ACP DISCUSS/DSCN MKR DOCD: CPT | Performed by: NURSE PRACTITIONER

## 2019-02-12 PROCEDURE — 3017F COLORECTAL CA SCREEN DOC REV: CPT | Performed by: NURSE PRACTITIONER

## 2019-02-12 PROCEDURE — G8484 FLU IMMUNIZE NO ADMIN: HCPCS | Performed by: NURSE PRACTITIONER

## 2019-02-12 PROCEDURE — 4004F PT TOBACCO SCREEN RCVD TLK: CPT | Performed by: NURSE PRACTITIONER

## 2019-02-12 PROCEDURE — 99214 OFFICE O/P EST MOD 30 MIN: CPT | Performed by: NURSE PRACTITIONER

## 2019-02-12 PROCEDURE — 81003 URINALYSIS AUTO W/O SCOPE: CPT | Performed by: NURSE PRACTITIONER

## 2019-02-16 LAB
ORGANISM: ABNORMAL
URINE CULTURE, ROUTINE: ABNORMAL
URINE CULTURE, ROUTINE: ABNORMAL

## 2019-02-18 ENCOUNTER — TELEPHONE (OUTPATIENT)
Dept: UROLOGY | Age: 74
End: 2019-02-18

## 2019-02-18 RX ORDER — DOXYCYCLINE HYCLATE 100 MG
100 TABLET ORAL 2 TIMES DAILY
Qty: 14 TABLET | Refills: 0 | Status: SHIPPED | OUTPATIENT
Start: 2019-02-18 | End: 2019-02-25

## 2019-02-19 ENCOUNTER — OFFICE VISIT (OUTPATIENT)
Dept: ONCOLOGY | Age: 74
End: 2019-02-19
Payer: MEDICARE

## 2019-02-19 ENCOUNTER — HOSPITAL ENCOUNTER (OUTPATIENT)
Dept: INFUSION THERAPY | Age: 74
Discharge: HOME | End: 2019-02-19
Payer: MEDICARE

## 2019-02-19 VITALS
HEART RATE: 73 BPM | OXYGEN SATURATION: 95 % | DIASTOLIC BLOOD PRESSURE: 58 MMHG | TEMPERATURE: 97.7 F | BODY MASS INDEX: 31.48 KG/M2 | HEIGHT: 67 IN | SYSTOLIC BLOOD PRESSURE: 145 MMHG | RESPIRATION RATE: 18 BRPM | WEIGHT: 200.6 LBS

## 2019-02-19 DIAGNOSIS — D49.4 BLADDER TUMOR: Primary | ICD-10-CM

## 2019-02-19 DIAGNOSIS — R31.0 GROSS HEMATURIA: ICD-10-CM

## 2019-02-19 DIAGNOSIS — C67.9 UROTHELIAL CARCINOMA OF BLADDER (HCC): ICD-10-CM

## 2019-02-19 PROCEDURE — 1123F ACP DISCUSS/DSCN MKR DOCD: CPT | Performed by: INTERNAL MEDICINE

## 2019-02-19 PROCEDURE — G8484 FLU IMMUNIZE NO ADMIN: HCPCS | Performed by: INTERNAL MEDICINE

## 2019-02-19 PROCEDURE — 99205 OFFICE O/P NEW HI 60 MIN: CPT | Performed by: INTERNAL MEDICINE

## 2019-02-19 PROCEDURE — 4004F PT TOBACCO SCREEN RCVD TLK: CPT | Performed by: INTERNAL MEDICINE

## 2019-02-19 PROCEDURE — 1101F PT FALLS ASSESS-DOCD LE1/YR: CPT | Performed by: INTERNAL MEDICINE

## 2019-02-19 PROCEDURE — G8427 DOCREV CUR MEDS BY ELIG CLIN: HCPCS | Performed by: INTERNAL MEDICINE

## 2019-02-19 PROCEDURE — 3017F COLORECTAL CA SCREEN DOC REV: CPT | Performed by: INTERNAL MEDICINE

## 2019-02-19 PROCEDURE — 99211 OFF/OP EST MAY X REQ PHY/QHP: CPT

## 2019-02-19 PROCEDURE — 4040F PNEUMOC VAC/ADMIN/RCVD: CPT | Performed by: INTERNAL MEDICINE

## 2019-02-19 PROCEDURE — G8417 CALC BMI ABV UP PARAM F/U: HCPCS | Performed by: INTERNAL MEDICINE

## 2019-02-20 ENCOUNTER — TELEPHONE (OUTPATIENT)
Dept: ONCOLOGY | Age: 74
End: 2019-02-20

## 2019-02-25 ENCOUNTER — OFFICE VISIT (OUTPATIENT)
Dept: UROLOGY | Age: 74
End: 2019-02-25
Payer: MEDICARE

## 2019-02-25 DIAGNOSIS — C67.9 MALIGNANT NEOPLASM OF URINARY BLADDER, UNSPECIFIED SITE (HCC): Primary | ICD-10-CM

## 2019-02-25 PROCEDURE — G8417 CALC BMI ABV UP PARAM F/U: HCPCS | Performed by: UROLOGY

## 2019-02-25 PROCEDURE — G8427 DOCREV CUR MEDS BY ELIG CLIN: HCPCS | Performed by: UROLOGY

## 2019-02-25 PROCEDURE — 4004F PT TOBACCO SCREEN RCVD TLK: CPT | Performed by: UROLOGY

## 2019-02-25 PROCEDURE — 1123F ACP DISCUSS/DSCN MKR DOCD: CPT | Performed by: UROLOGY

## 2019-02-25 PROCEDURE — 4040F PNEUMOC VAC/ADMIN/RCVD: CPT | Performed by: UROLOGY

## 2019-02-25 PROCEDURE — G8484 FLU IMMUNIZE NO ADMIN: HCPCS | Performed by: UROLOGY

## 2019-02-25 PROCEDURE — 99215 OFFICE O/P EST HI 40 MIN: CPT | Performed by: UROLOGY

## 2019-02-25 PROCEDURE — 3017F COLORECTAL CA SCREEN DOC REV: CPT | Performed by: UROLOGY

## 2019-02-26 ENCOUNTER — TELEPHONE (OUTPATIENT)
Dept: ONCOLOGY | Age: 74
End: 2019-02-26

## 2019-02-27 ENCOUNTER — HOSPITAL ENCOUNTER (OUTPATIENT)
Dept: RADIATION ONCOLOGY | Age: 74
Discharge: HOME | End: 2019-02-27
Payer: MEDICARE

## 2019-02-27 PROCEDURE — 99202 OFFICE O/P NEW SF 15 MIN: CPT | Performed by: RADIOLOGY

## 2019-02-27 ASSESSMENT — ENCOUNTER SYMPTOMS
NAUSEA: 0
FACIAL SWELLING: 0
WHEEZING: 0
RECTAL PAIN: 0
TROUBLE SWALLOWING: 0
EYE DISCHARGE: 0
BLOOD IN STOOL: 0
BACK PAIN: 0
COUGH: 0
ABDOMINAL DISTENTION: 0
SORE THROAT: 0
ABDOMINAL PAIN: 0
CONSTIPATION: 0
VOMITING: 0
COLOR CHANGE: 0
DIARRHEA: 0
CHEST TIGHTNESS: 0
SHORTNESS OF BREATH: 0

## 2019-03-01 ENCOUNTER — HOSPITAL ENCOUNTER (OUTPATIENT)
Dept: RADIATION ONCOLOGY | Age: 74
Discharge: HOME | End: 2019-03-01
Payer: MEDICARE

## 2019-03-04 PROCEDURE — 99212 OFFICE O/P EST SF 10 MIN: CPT

## 2019-03-05 ENCOUNTER — HOSPITAL ENCOUNTER (OUTPATIENT)
Dept: MRI IMAGING | Age: 74
Discharge: HOME | End: 2019-03-05
Payer: MEDICARE

## 2019-03-05 DIAGNOSIS — C67.9 MALIGNANT NEOPLASM OF URINARY BLADDER, UNSPECIFIED SITE (HCC): ICD-10-CM

## 2019-03-05 PROCEDURE — 72197 MRI PELVIS W/O & W/DYE: CPT

## 2019-03-05 PROCEDURE — 6360000004 HC RX CONTRAST MEDICATION: Performed by: UROLOGY

## 2019-03-05 PROCEDURE — A9579 GAD-BASE MR CONTRAST NOS,1ML: HCPCS | Performed by: UROLOGY

## 2019-03-05 RX ADMIN — GADOTERIDOL 20 ML: 279.3 INJECTION, SOLUTION INTRAVENOUS at 12:38

## 2019-03-08 ENCOUNTER — HOSPITAL ENCOUNTER (OUTPATIENT)
Dept: CT IMAGING | Age: 74
Discharge: HOME | End: 2019-03-08
Payer: MEDICARE

## 2019-03-08 ENCOUNTER — TELEPHONE (OUTPATIENT)
Dept: ONCOLOGY | Age: 74
End: 2019-03-08

## 2019-03-08 DIAGNOSIS — C67.8 MALIGNANT NEOPLASM OF OVERLAPPING SITES OF BLADDER (HCC): ICD-10-CM

## 2019-03-08 PROCEDURE — 77470 SPECIAL RADIATION TREATMENT: CPT | Performed by: RADIOLOGY

## 2019-03-08 PROCEDURE — 3209999900 CT GUIDE RADIATION THERAPY NO CHARGE

## 2019-03-08 PROCEDURE — 77334 RADIATION TREATMENT AID(S): CPT | Performed by: RADIOLOGY

## 2019-03-13 ENCOUNTER — TELEPHONE (OUTPATIENT)
Dept: ONCOLOGY | Age: 74
End: 2019-03-13

## 2019-03-13 PROBLEM — C67.8 MALIGNANT NEOPLASM OF OVERLAPPING SITES OF BLADDER (HCC): Status: ACTIVE | Noted: 2019-03-13

## 2019-03-13 RX ORDER — PALONOSETRON 0.05 MG/ML
0.25 INJECTION, SOLUTION INTRAVENOUS ONCE
Status: CANCELLED | OUTPATIENT
Start: 2019-03-18

## 2019-03-13 RX ORDER — DIPHENHYDRAMINE HYDROCHLORIDE 50 MG/ML
50 INJECTION INTRAMUSCULAR; INTRAVENOUS ONCE
Status: CANCELLED | OUTPATIENT
Start: 2019-03-18 | End: 2019-03-18

## 2019-03-13 RX ORDER — HEPARIN SODIUM (PORCINE) LOCK FLUSH IV SOLN 100 UNIT/ML 100 UNIT/ML
500 SOLUTION INTRAVENOUS PRN
Status: CANCELLED | OUTPATIENT
Start: 2019-03-18

## 2019-03-13 RX ORDER — SODIUM CHLORIDE 9 MG/ML
INJECTION, SOLUTION INTRAVENOUS ONCE
Status: CANCELLED | OUTPATIENT
Start: 2019-03-18 | End: 2019-03-18

## 2019-03-13 RX ORDER — 0.9 % SODIUM CHLORIDE 0.9 %
10 VIAL (ML) INJECTION ONCE
Status: CANCELLED | OUTPATIENT
Start: 2019-03-18 | End: 2019-03-18

## 2019-03-13 RX ORDER — METHYLPREDNISOLONE SODIUM SUCCINATE 125 MG/2ML
125 INJECTION, POWDER, LYOPHILIZED, FOR SOLUTION INTRAMUSCULAR; INTRAVENOUS ONCE
Status: CANCELLED | OUTPATIENT
Start: 2019-03-18 | End: 2019-03-18

## 2019-03-13 RX ORDER — SODIUM CHLORIDE 9 MG/ML
INJECTION, SOLUTION INTRAVENOUS CONTINUOUS
Status: CANCELLED | OUTPATIENT
Start: 2019-03-18

## 2019-03-13 RX ORDER — SODIUM CHLORIDE 0.9 % (FLUSH) 0.9 %
10 SYRINGE (ML) INJECTION PRN
Status: CANCELLED | OUTPATIENT
Start: 2019-03-18

## 2019-03-13 RX ORDER — SODIUM CHLORIDE 0.9 % (FLUSH) 0.9 %
5 SYRINGE (ML) INJECTION PRN
Status: CANCELLED | OUTPATIENT
Start: 2019-03-18

## 2019-03-14 PROCEDURE — 77300 RADIATION THERAPY DOSE PLAN: CPT | Performed by: RADIOLOGY

## 2019-03-14 PROCEDURE — 77338 DESIGN MLC DEVICE FOR IMRT: CPT | Performed by: RADIOLOGY

## 2019-03-14 PROCEDURE — 77301 RADIOTHERAPY DOSE PLAN IMRT: CPT | Performed by: RADIOLOGY

## 2019-03-14 NOTE — PROGRESS NOTES
New chemotherapy validation note:     Diagnosis for chemotherapy: bladder     Regimen ordered: NCCN and reference below     Reference or literature used for validation: reference below      Date literature or guideline last updated NCCN - 3/30/17      Deviation from literature or guideline used: NCCN shows 20mg/m2 daily x 2 per cycle but these studies show different dose weekly.     Summary of any verbal or telephone information obtained: n/a     From http://ascopubs.org/doi/abs/10.1200/jco.2008.26.15_suppl. 35794     Concurrent weekly cisplatin during radiation treatment of locally advanced bladder carcinoma in elderly patients   SAULO Santiago Sr., KAMERON Kelly More     · Abstract      [id number]   Background: In [facility] chemoradiation is an alternative to radical cystectomy in invasive bladder cancer since 1995. The classical schedule associate definitive radiotherapy delivering 65 Gy in 7 weeks with 5FU and cisplatin (5FU: 1000 mg/m 2 day 1 to day 5 and cisplatin 100 mg/m 2 day 1) at Week 1, Week 3 and Week 7. Complete response was achieved in 70% but toxicity is high and inacceptable especially in elderly patients (Age >65 year). Aim of study: To assess efficacy and tolerance of new schedule of concurrent weekly cisplatin chemotherapy in conjunction with radiotherapy in the treatment of locally advanced bladder cancer. Methods: From March 2000 and December 2005 the [facility] oncology center enrolled 52 patients in Phase II study of weekly cisplatin (30 mg/m(2) × 6 doses) Plus radiation to a dose of 65 Gy equivalent: (24 × 2.5 gy) over 6 weeks. Inclusion criteria: was -bladder urothelial carcinoma, stage T3-T4, -age >71 year and -TUR. Results: Grade 3 vesical toxicity occurred in 27% of the patients. Twenty one patients (40%) experienced grade 2 rectal mucositis. Incidence of late complications was moderate (9%). Thirty five patients (67%) achieved complete remission at cystoscopic evaluation.  After a minimal follow up of 2 years, local recurrence was seen in 6 of the 35 patients (17%). The local control rate was near 56% with a functional bladder being retained in 21 of the 29 patients (72%). Conclusions: this new schedule seems to be effective with an acceptable toxicity in elderly patients with bladder carcinoma. This approach is now being a real alternative to others schedules of chemoradiation. No significant financial relationships to disclose.     This article shows 40mg/m2 dose:  [email or web address]     Materials and Methods  We analyzed 50 patients with biopsy-proven, the American Joint Committee on Cancer (AJCC) stage II-IV bladder cancer who [place]pres[place] [facility] [place] to December 2010. In the same period, there were 572 patients who were diagnosed to have bladder cancer in our institution, regardless of AJCC stage. The study was approved by the Institutional Review Board of [facility], and informed consent was waived. Patients underwent physical examinations, complete blood count (CBC), liver function tests, urinalysis, chest radiography, cystoscopy with biopsy, and pelvic magnetic resonance imaging (MRI) or computed tomography (CT) before treatments. Patient performance status was evaluated according to the guidelines of the [id number] [facility] (ECOG) [15]. During RT, CBC was checked at least once a week. When the absolute neutrophil count was <1,000/mm3 or the platelet count <86,287/LY0, treatments were interrupted or delayed until the patient's condition recovered. Red blood cell transfusion was given to patients with hemoglobin levels below 10.0 g/dL. TURBT was performed to get accurate diagnosis and maximal tumor removal. If needed, repeated TURBT was also done during follow-up in some patients with suspicion of recurrence.  Most underwent TURBT under spinal anesthesia. External beam RT (EBRT) was delivered using 6, 10, 15, or 20 MV photon beams with a four-field box technique to a dose of 63 Gy in 35 fractions for 5 days per week within 7 weeks. To define the initial pelvic fields in most of the patients, the superior border was at the middle of the sacroiliac joint or at the L5-S1 interspace and the interior border was at or just below the bottom of the obturator foramen. The inferior border was sometimes extended to the bottom of the ischial tuberosity, depending on disease involvement with the prostatic urethra or bladder neck. The lateral border was 1.5 cm lateral to the true pelvis to encompass the bladder and the pelvic lymph nodes. On the lateral portal, the anterior border was placed in front of the bladder and the posterior border was set with at least a 3-cm margin behind the posterior border bladder wall. The portals were reduced after 45 Gy (1.8 Gy per fraction) with a follow-up imaging study using a CT scan and the boost treatment included an initial gross tumor volume with 2-cm margin. Concurrent chemotherapy was administered every week by intravenous infusion. Cisplatin (40 mg/m2) was given on the first day of the chemotherapy cycle (D1, D8, D15, and D22) within 16 hours after RT. After completion of treatment, all patients were evaluated by radiation oncologists and urologic oncologist at 3-month intervals for 1 year, and at least 6 months thereafter. Complete response (CR) was defined as no evidence of gross tumor on follow-up cystoscopic examination, and pelvic MRI or CT 3 months after completion of treatment. Partial response (ID) was defined as >30% reduction of gross tumor volume compared to the initial tumor [16].     Karrie     PATIENTS AND METHODS:   In January 1997 the [address] [place] [address] Group embarked on a Phase II study (TROG 97.01) of weekly cisplatin (35 mg/m(2) x 7

## 2019-03-15 ENCOUNTER — HOSPITAL ENCOUNTER (OUTPATIENT)
Dept: INFUSION THERAPY | Age: 74
Discharge: HOME | End: 2019-03-15
Payer: MEDICARE

## 2019-03-15 VITALS
TEMPERATURE: 97.3 F | SYSTOLIC BLOOD PRESSURE: 132 MMHG | OXYGEN SATURATION: 94 % | HEART RATE: 72 BPM | DIASTOLIC BLOOD PRESSURE: 63 MMHG | RESPIRATION RATE: 18 BRPM

## 2019-03-15 DIAGNOSIS — C67.8 MALIGNANT NEOPLASM OF OVERLAPPING SITES OF BLADDER (HCC): ICD-10-CM

## 2019-03-15 PROCEDURE — 99212 OFFICE O/P EST SF 10 MIN: CPT

## 2019-03-15 ASSESSMENT — PAIN DESCRIPTION - LOCATION: LOCATION: PELVIS

## 2019-03-15 ASSESSMENT — PAIN DESCRIPTION - PAIN TYPE: TYPE: CHRONIC PAIN

## 2019-03-15 ASSESSMENT — PAIN DESCRIPTION - PROGRESSION: CLINICAL_PROGRESSION: NOT CHANGED

## 2019-03-15 ASSESSMENT — PAIN DESCRIPTION - DESCRIPTORS: DESCRIPTORS: DULL

## 2019-03-15 ASSESSMENT — PAIN DESCRIPTION - ONSET: ONSET: ON-GOING

## 2019-03-15 ASSESSMENT — PAIN DESCRIPTION - FREQUENCY: FREQUENCY: INTERMITTENT

## 2019-03-15 ASSESSMENT — PAIN SCALES - GENERAL: PAINLEVEL_OUTOF10: 2

## 2019-03-15 NOTE — PLAN OF CARE
Problem: Intellectual/Education/Knowledge Deficit  Goal: Teaching initiated upon admission  Outcome: Met This Shift  Note:   Patient verbalizes understanding to verbal information given on Cisplatin and pre meds with hydration,action and possible side effects. Aware to call MD if develop complications. Problem: Discharge Planning  Goal: Knowledge of discharge instructions  Description  Knowledge of discharge instructions     Outcome: Met This Shift  Note:   Patient verbalizes understanding of discharge instructions, follow up appointment, and when to call physician if needed      Problem: Intellectual/Education/Knowledge Deficit  Intervention: Verbal/written education provided  Note:   Chemotherapy Teaching     What is Chemotherapy   Drug action ? Method of Administration ? Handouts given ? Side Effects  Nausea/vomiting ? Diarrhea ? Fatigue ? Signs / Symptoms of infection ? Neutropenia ? Thrombocytopenia ? Alopecia ? neuropathy ? [place] diet &  the importance of fluids ? Micellaneous  Importance of nutrition ? Importance of oral hygiene ? When to call the MD ?   Monitoring labs ? Use of supportive services ? Explanation of Drug Regimen / Frequency  Cisplatin weekly with radiation     Comments  Verbalized understanding to drug,action,side effects and when to call MD       Care plan reviewed with patient. Patient verbalizes understanding of the plan of care and contributes to goal setting.

## 2019-03-17 ASSESSMENT — ENCOUNTER SYMPTOMS
FACIAL SWELLING: 0
VOMITING: 0
NAUSEA: 0
ABDOMINAL PAIN: 0
DIARRHEA: 0
RECTAL PAIN: 0
TROUBLE SWALLOWING: 0
ABDOMINAL DISTENTION: 0
COUGH: 0
BLOOD IN STOOL: 0
CHEST TIGHTNESS: 0
SORE THROAT: 0
WHEEZING: 0
COLOR CHANGE: 0
SHORTNESS OF BREATH: 0
CONSTIPATION: 0
BACK PAIN: 0
EYE DISCHARGE: 0

## 2019-03-18 ENCOUNTER — HOSPITAL ENCOUNTER (OUTPATIENT)
Dept: INFUSION THERAPY | Age: 74
Discharge: HOME | End: 2019-03-18
Payer: MEDICARE

## 2019-03-18 ENCOUNTER — OFFICE VISIT (OUTPATIENT)
Dept: ONCOLOGY | Age: 74
End: 2019-03-18
Payer: MEDICARE

## 2019-03-18 VITALS
BODY MASS INDEX: 31.01 KG/M2 | RESPIRATION RATE: 16 BRPM | SYSTOLIC BLOOD PRESSURE: 122 MMHG | DIASTOLIC BLOOD PRESSURE: 58 MMHG | HEIGHT: 67 IN | WEIGHT: 197.6 LBS | TEMPERATURE: 98.2 F | OXYGEN SATURATION: 95 % | HEART RATE: 72 BPM

## 2019-03-18 VITALS
TEMPERATURE: 97.8 F | WEIGHT: 197.6 LBS | HEART RATE: 69 BPM | SYSTOLIC BLOOD PRESSURE: 145 MMHG | BODY MASS INDEX: 31.01 KG/M2 | OXYGEN SATURATION: 97 % | HEIGHT: 67 IN | DIASTOLIC BLOOD PRESSURE: 68 MMHG | RESPIRATION RATE: 16 BRPM

## 2019-03-18 DIAGNOSIS — D49.4 BLADDER TUMOR: ICD-10-CM

## 2019-03-18 DIAGNOSIS — C67.8 MALIGNANT NEOPLASM OF OVERLAPPING SITES OF BLADDER (HCC): Primary | ICD-10-CM

## 2019-03-18 DIAGNOSIS — R31.0 GROSS HEMATURIA: ICD-10-CM

## 2019-03-18 DIAGNOSIS — Z51.11 ENCOUNTER FOR CHEMOTHERAPY MANAGEMENT: ICD-10-CM

## 2019-03-18 DIAGNOSIS — C67.9 UROTHELIAL CARCINOMA OF BLADDER (HCC): ICD-10-CM

## 2019-03-18 DIAGNOSIS — D49.4 BLADDER TUMOR: Primary | ICD-10-CM

## 2019-03-18 LAB
ALBUMIN SERPL-MCNC: 3.5 G/DL (ref 3.5–5.1)
ALP BLD-CCNC: 122 U/L (ref 38–126)
ALT SERPL-CCNC: 10 U/L (ref 11–66)
AST SERPL-CCNC: 14 U/L (ref 5–40)
BILIRUB SERPL-MCNC: 0.7 MG/DL (ref 0.3–1.2)
BILIRUBIN DIRECT: < 0.2 MG/DL (ref 0–0.3)
BUN, WHOLE BLOOD: 13 MG/DL (ref 8–26)
CHLORIDE, WHOLE BLOOD: 101 MEQ/L (ref 98–109)
CREATININE, WHOLE BLOOD: 0.7 MG/DL (ref 0.5–1.2)
GFR, ESTIMATED: > 90 ML/MIN/1.73M2
GLUCOSE, WHOLE BLOOD: 124 MG/DL (ref 70–108)
HCT VFR BLD CALC: 42 % (ref 42–52)
HEMOGLOBIN: 14.2 GM/DL (ref 14–18)
IONIZED CALCIUM, WHOLE BLOOD: 1.19 MMOL/L (ref 1.12–1.32)
MCH RBC QN AUTO: 29.5 PG (ref 27–31)
MCHC RBC AUTO-ENTMCNC: 33.8 GM/DL (ref 33–37)
MCV RBC AUTO: 87 FL (ref 80–94)
PDW BLD-RTO: 11.9 % (ref 11.5–14.5)
PLATELET # BLD: 207 THOU/MM3 (ref 130–400)
PMV BLD AUTO: 7.4 FL (ref 7.4–10.4)
POTASSIUM, WHOLE BLOOD: 4.4 MEQ/L (ref 3.5–4.9)
RBC # BLD: 4.81 MILL/MM3 (ref 4.7–6.1)
SEG NEUTROPHILS: 76 % (ref 43–75)
SEGMENTED NEUTROPHILS ABSOLUTE COUNT: 7 THOU/MM3 (ref 1.8–7.7)
SODIUM, WHOLE BLOOD: 142 MEQ/L (ref 138–146)
TOTAL CO2, WHOLE BLOOD: 30 MEQ/L (ref 23–33)
TOTAL PROTEIN: 6.6 G/DL (ref 6.1–8)
WBC # BLD: 9.2 THOU/MM3 (ref 4.8–10.8)

## 2019-03-18 PROCEDURE — 77336 RADIATION PHYSICS CONSULT: CPT | Performed by: RADIOLOGY

## 2019-03-18 PROCEDURE — G8417 CALC BMI ABV UP PARAM F/U: HCPCS | Performed by: INTERNAL MEDICINE

## 2019-03-18 PROCEDURE — 96415 CHEMO IV INFUSION ADDL HR: CPT

## 2019-03-18 PROCEDURE — 1123F ACP DISCUSS/DSCN MKR DOCD: CPT | Performed by: INTERNAL MEDICINE

## 2019-03-18 PROCEDURE — 80047 BASIC METABLC PNL IONIZED CA: CPT

## 2019-03-18 PROCEDURE — 85027 COMPLETE CBC AUTOMATED: CPT

## 2019-03-18 PROCEDURE — 4004F PT TOBACCO SCREEN RCVD TLK: CPT | Performed by: INTERNAL MEDICINE

## 2019-03-18 PROCEDURE — 96367 TX/PROPH/DG ADDL SEQ IV INF: CPT

## 2019-03-18 PROCEDURE — 2709999900 HC NON-CHARGEABLE SUPPLY

## 2019-03-18 PROCEDURE — 1101F PT FALLS ASSESS-DOCD LE1/YR: CPT | Performed by: INTERNAL MEDICINE

## 2019-03-18 PROCEDURE — G8427 DOCREV CUR MEDS BY ELIG CLIN: HCPCS | Performed by: INTERNAL MEDICINE

## 2019-03-18 PROCEDURE — 96413 CHEMO IV INFUSION 1 HR: CPT

## 2019-03-18 PROCEDURE — 2580000003 HC RX 258: Performed by: INTERNAL MEDICINE

## 2019-03-18 PROCEDURE — 99215 OFFICE O/P EST HI 40 MIN: CPT | Performed by: INTERNAL MEDICINE

## 2019-03-18 PROCEDURE — 6360000002 HC RX W HCPCS: Performed by: INTERNAL MEDICINE

## 2019-03-18 PROCEDURE — 36415 COLL VENOUS BLD VENIPUNCTURE: CPT

## 2019-03-18 PROCEDURE — 4040F PNEUMOC VAC/ADMIN/RCVD: CPT | Performed by: INTERNAL MEDICINE

## 2019-03-18 PROCEDURE — 96366 THER/PROPH/DIAG IV INF ADDON: CPT

## 2019-03-18 PROCEDURE — G8484 FLU IMMUNIZE NO ADMIN: HCPCS | Performed by: INTERNAL MEDICINE

## 2019-03-18 PROCEDURE — 96375 TX/PRO/DX INJ NEW DRUG ADDON: CPT

## 2019-03-18 PROCEDURE — 3017F COLORECTAL CA SCREEN DOC REV: CPT | Performed by: INTERNAL MEDICINE

## 2019-03-18 PROCEDURE — 77386 HC NTSTY MODUL RAD TX DLVR CPLX: CPT | Performed by: RADIOLOGY

## 2019-03-18 PROCEDURE — 80076 HEPATIC FUNCTION PANEL: CPT

## 2019-03-18 PROCEDURE — G0463 HOSPITAL OUTPT CLINIC VISIT: HCPCS

## 2019-03-18 RX ORDER — SODIUM CHLORIDE 0.9 % (FLUSH) 0.9 %
10 SYRINGE (ML) INJECTION PRN
Status: CANCELLED | OUTPATIENT
Start: 2019-03-18

## 2019-03-18 RX ORDER — PALONOSETRON 0.05 MG/ML
0.25 INJECTION, SOLUTION INTRAVENOUS ONCE
Status: COMPLETED | OUTPATIENT
Start: 2019-03-18 | End: 2019-03-18

## 2019-03-18 RX ORDER — SODIUM CHLORIDE 0.9 % (FLUSH) 0.9 %
20 SYRINGE (ML) INJECTION PRN
Status: CANCELLED | OUTPATIENT
Start: 2019-03-18

## 2019-03-18 RX ORDER — SODIUM CHLORIDE 9 MG/ML
INJECTION, SOLUTION INTRAVENOUS ONCE
Status: COMPLETED | OUTPATIENT
Start: 2019-03-18 | End: 2019-03-18

## 2019-03-18 RX ORDER — HEPARIN SODIUM (PORCINE) LOCK FLUSH IV SOLN 100 UNIT/ML 100 UNIT/ML
500 SOLUTION INTRAVENOUS PRN
Status: CANCELLED | OUTPATIENT
Start: 2019-03-18

## 2019-03-18 RX ORDER — DEXAMETHASONE 4 MG/1
8 TABLET ORAL
Qty: 6 TABLET | Refills: 3 | Status: SHIPPED | OUTPATIENT
Start: 2019-03-19 | End: 2019-04-01 | Stop reason: SDUPTHER

## 2019-03-18 RX ADMIN — SODIUM CHLORIDE 150 MG: 9 INJECTION, SOLUTION INTRAVENOUS at 12:20

## 2019-03-18 RX ADMIN — CISPLATIN: 1 INJECTION, SOLUTION INTRAVENOUS at 12:52

## 2019-03-18 RX ADMIN — DEXAMETHASONE SODIUM PHOSPHATE 12 MG: 4 INJECTION, SOLUTION INTRAMUSCULAR; INTRAVENOUS at 10:57

## 2019-03-18 RX ADMIN — SODIUM CHLORIDE: 9 INJECTION, SOLUTION INTRAVENOUS at 10:50

## 2019-03-18 RX ADMIN — POTASSIUM CHLORIDE: 2 INJECTION, SOLUTION, CONCENTRATE INTRAVENOUS at 15:03

## 2019-03-18 RX ADMIN — POTASSIUM CHLORIDE: 2 INJECTION, SOLUTION, CONCENTRATE INTRAVENOUS at 11:18

## 2019-03-18 RX ADMIN — PALONOSETRON 0.25 MG: 0.05 INJECTION, SOLUTION INTRAVENOUS at 11:15

## 2019-03-18 NOTE — ONCOLOGY
Chemotherapy Administration    Pre-assessment Data: Antineoplastic Agents  Other:   See toxicity flow sheet for assessment [x]     Physician Notification of Concerns Related to Chemotherapy Administration:   Physician Notified Travis Bolus / Time of Notification      Interventions:   Lab work assessed  [x]   Height / Weight verified for dose [x]   Current MAR reviewed [x]   Emergency drugs available as appropriate [x]   Anaphylaxis assessment completed [x]   Pre-medications administered as ordered [x]   Blood return noted upon initiation of chemotherapy [x]   Blood return noted each 1-2ml of a vesicant medication if given IV push []   Blood return noted each 2-3ml of a non-vesicant medication if given IV push []   Monitor for signs / symptoms of hypersensitivity reaction [x]   Chemotherapy orders (drug/dose/rate) verified by 2 Chemo certified RNs [x]   Monitor IV site and blood return throughout the infusion of the medication [x]   Document IV site checks on the IV assessment form [x]   Document chemotherapy teaching on the Patient Education tab [x]   Document patient verbalizes understanding of medications being administered-  Cisplatin [x]   If IV infiltration, see ONS Guidelines []   Other:      []

## 2019-03-18 NOTE — PLAN OF CARE
Problem: Intellectual/Education/Knowledge Deficit  Goal: Teaching initiated upon admission  Outcome: Met This Shift  Note:   Patient verbalizes understanding to verbal information given on cisplatin,action and possible side effects. Aware to call MD if develop complications. Intervention: Verbal/written education provided  Note:   Chemotherapy Teaching     What is Chemotherapy   Drug action ? Method of Administration ? Handouts given ? Side Effects  Nausea/vomiting ? Diarrhea ? Fatigue ? Signs / Symptoms of infection ? Neutropenia ? Thrombocytopenia ? Alopecia ? neuropathy ? [place] diet &  the importance of fluids ? Micellaneous  Importance of nutrition ? Importance of oral hygiene ? When to call the MD ?   Monitoring labs ? Use of supportive services ? Explanation of Drug Regimen / Frequency  cisplatin     Comments  Verbalized understanding to drug,action,side effects and when to call MD         Problem: Discharge Planning  Goal: Knowledge of discharge instructions  Description  Knowledge of discharge instructions     Outcome: Met This Shift  Note:   Verbalize understanding of discharge instructions, follow up appointments, and when to call Physician. Intervention: Interaction with patient/family and care team  Note:   Provide discharge instructions. Problem: Musculor/Skeletal Functional Status  Goal: Absence of falls  Outcome: Met This Shift  Note:   Free from falls while in O.P. Oncology. Intervention: Provide standby assistance  Note:   Discussed the need to use the call light for assistance when getting up to ambulate. Call light within reach. Care plan reviewed with patient. Patient verbalize understanding of the plan of care and contribute to goal setting.

## 2019-03-18 NOTE — PROGRESS NOTES
Patient assessed for the following post chemotherapy:    Dizziness   No  Lightheadedness  No      Acute nausea/vomiting No  Headache   No  Chest pain/pressure  No  Rash/itching   No  Shortness of breath  No    Patient kept for 20 minutes observation post infusion chemotherapy. Patient tolerated chemotherapy treatment Cisplatin without any complications. Last vital signs:   BP (!) 122/58   Pulse 72   Temp 98.2 °F (36.8 °C) (Oral)   Resp 16   Ht 5' 7\" (1.702 m)   Wt 197 lb 9.6 oz (89.6 kg)   SpO2 95%   BMI 30.95 kg/m²       Patient instructed if experience any of the above symptoms following today's infusion,he is to notify MD immediately or go to the emergency department. Discharge instructions given to patient. Verbalizes understanding. Ambulated off unit per self in stable condition with belongings.

## 2019-03-19 DIAGNOSIS — C67.8 MALIGNANT NEOPLASM OF OVERLAPPING SITES OF BLADDER (HCC): Primary | ICD-10-CM

## 2019-03-19 PROCEDURE — 77386 HC NTSTY MODUL RAD TX DLVR CPLX: CPT | Performed by: RADIOLOGY

## 2019-03-20 ENCOUNTER — CLINICAL DOCUMENTATION (OUTPATIENT)
Dept: NUTRITION | Age: 74
End: 2019-03-20

## 2019-03-20 PROCEDURE — 77386 HC NTSTY MODUL RAD TX DLVR CPLX: CPT | Performed by: RADIOLOGY

## 2019-03-21 PROCEDURE — 77386 HC NTSTY MODUL RAD TX DLVR CPLX: CPT | Performed by: RADIOLOGY

## 2019-03-25 ENCOUNTER — HOSPITAL ENCOUNTER (OUTPATIENT)
Dept: INFUSION THERAPY | Age: 74
Discharge: HOME | End: 2019-03-25
Payer: MEDICARE

## 2019-03-25 ENCOUNTER — OFFICE VISIT (OUTPATIENT)
Dept: ONCOLOGY | Age: 74
End: 2019-03-25
Payer: MEDICARE

## 2019-03-25 VITALS
OXYGEN SATURATION: 98 % | DIASTOLIC BLOOD PRESSURE: 64 MMHG | RESPIRATION RATE: 16 BRPM | HEIGHT: 67 IN | BODY MASS INDEX: 30.95 KG/M2 | TEMPERATURE: 97.4 F | HEART RATE: 71 BPM | SYSTOLIC BLOOD PRESSURE: 158 MMHG | WEIGHT: 197.2 LBS

## 2019-03-25 VITALS
HEIGHT: 67 IN | DIASTOLIC BLOOD PRESSURE: 68 MMHG | BODY MASS INDEX: 30.95 KG/M2 | OXYGEN SATURATION: 94 % | HEART RATE: 66 BPM | SYSTOLIC BLOOD PRESSURE: 142 MMHG | TEMPERATURE: 98.4 F | WEIGHT: 197.2 LBS | RESPIRATION RATE: 16 BRPM

## 2019-03-25 DIAGNOSIS — D49.4 BLADDER TUMOR: Primary | ICD-10-CM

## 2019-03-25 DIAGNOSIS — C67.9 UROTHELIAL CARCINOMA OF BLADDER (HCC): ICD-10-CM

## 2019-03-25 DIAGNOSIS — D49.4 BLADDER TUMOR: ICD-10-CM

## 2019-03-25 DIAGNOSIS — Z51.11 ENCOUNTER FOR CHEMOTHERAPY MANAGEMENT: ICD-10-CM

## 2019-03-25 DIAGNOSIS — C67.8 MALIGNANT NEOPLASM OF OVERLAPPING SITES OF BLADDER (HCC): Primary | ICD-10-CM

## 2019-03-25 LAB
ALBUMIN SERPL-MCNC: 3.5 G/DL (ref 3.5–5.1)
ALP BLD-CCNC: 102 U/L (ref 38–126)
ALT SERPL-CCNC: 10 U/L (ref 11–66)
AST SERPL-CCNC: 12 U/L (ref 5–40)
BILIRUB SERPL-MCNC: 0.5 MG/DL (ref 0.3–1.2)
BILIRUBIN DIRECT: < 0.2 MG/DL (ref 0–0.3)
BUN, WHOLE BLOOD: 11 MG/DL (ref 8–26)
CHLORIDE, WHOLE BLOOD: 98 MEQ/L (ref 98–109)
CREATININE, WHOLE BLOOD: 0.7 MG/DL (ref 0.5–1.2)
GFR, ESTIMATED: > 90 ML/MIN/1.73M2
GLUCOSE, WHOLE BLOOD: 160 MG/DL (ref 70–108)
HCT VFR BLD CALC: 40.4 % (ref 42–52)
HEMOGLOBIN: 13.6 GM/DL (ref 14–18)
IONIZED CALCIUM, WHOLE BLOOD: 1.22 MMOL/L (ref 1.12–1.32)
MCH RBC QN AUTO: 29.5 PG (ref 27–31)
MCHC RBC AUTO-ENTMCNC: 33.7 GM/DL (ref 33–37)
MCV RBC AUTO: 87 FL (ref 80–94)
PDW BLD-RTO: 11.4 % (ref 11.5–14.5)
PLATELET # BLD: 194 THOU/MM3 (ref 130–400)
PMV BLD AUTO: 7.2 FL (ref 7.4–10.4)
POTASSIUM, WHOLE BLOOD: 3.9 MEQ/L (ref 3.5–4.9)
RBC # BLD: 4.62 MILL/MM3 (ref 4.7–6.1)
SEG NEUTROPHILS: 73 % (ref 43–75)
SEGMENTED NEUTROPHILS ABSOLUTE COUNT: 5.9 THOU/MM3 (ref 1.8–7.7)
SODIUM, WHOLE BLOOD: 140 MEQ/L (ref 138–146)
TOTAL CO2, WHOLE BLOOD: 30 MEQ/L (ref 23–33)
TOTAL PROTEIN: 6.7 G/DL (ref 6.1–8)
WBC # BLD: 8.1 THOU/MM3 (ref 4.8–10.8)

## 2019-03-25 PROCEDURE — 80076 HEPATIC FUNCTION PANEL: CPT

## 2019-03-25 PROCEDURE — G8484 FLU IMMUNIZE NO ADMIN: HCPCS | Performed by: INTERNAL MEDICINE

## 2019-03-25 PROCEDURE — 96375 TX/PRO/DX INJ NEW DRUG ADDON: CPT

## 2019-03-25 PROCEDURE — 96366 THER/PROPH/DIAG IV INF ADDON: CPT

## 2019-03-25 PROCEDURE — 96367 TX/PROPH/DG ADDL SEQ IV INF: CPT

## 2019-03-25 PROCEDURE — G8417 CALC BMI ABV UP PARAM F/U: HCPCS | Performed by: INTERNAL MEDICINE

## 2019-03-25 PROCEDURE — 85027 COMPLETE CBC AUTOMATED: CPT

## 2019-03-25 PROCEDURE — 2709999900 HC NON-CHARGEABLE SUPPLY

## 2019-03-25 PROCEDURE — 96413 CHEMO IV INFUSION 1 HR: CPT

## 2019-03-25 PROCEDURE — 1123F ACP DISCUSS/DSCN MKR DOCD: CPT | Performed by: INTERNAL MEDICINE

## 2019-03-25 PROCEDURE — 36415 COLL VENOUS BLD VENIPUNCTURE: CPT

## 2019-03-25 PROCEDURE — 99214 OFFICE O/P EST MOD 30 MIN: CPT | Performed by: INTERNAL MEDICINE

## 2019-03-25 PROCEDURE — 3017F COLORECTAL CA SCREEN DOC REV: CPT | Performed by: INTERNAL MEDICINE

## 2019-03-25 PROCEDURE — 4004F PT TOBACCO SCREEN RCVD TLK: CPT | Performed by: INTERNAL MEDICINE

## 2019-03-25 PROCEDURE — 2580000003 HC RX 258: Performed by: INTERNAL MEDICINE

## 2019-03-25 PROCEDURE — 4040F PNEUMOC VAC/ADMIN/RCVD: CPT | Performed by: INTERNAL MEDICINE

## 2019-03-25 PROCEDURE — 77386 HC NTSTY MODUL RAD TX DLVR CPLX: CPT | Performed by: RADIOLOGY

## 2019-03-25 PROCEDURE — 80047 BASIC METABLC PNL IONIZED CA: CPT

## 2019-03-25 PROCEDURE — G8427 DOCREV CUR MEDS BY ELIG CLIN: HCPCS | Performed by: INTERNAL MEDICINE

## 2019-03-25 PROCEDURE — 96415 CHEMO IV INFUSION ADDL HR: CPT

## 2019-03-25 PROCEDURE — 6360000002 HC RX W HCPCS: Performed by: INTERNAL MEDICINE

## 2019-03-25 PROCEDURE — G0463 HOSPITAL OUTPT CLINIC VISIT: HCPCS

## 2019-03-25 RX ORDER — METHYLPREDNISOLONE SODIUM SUCCINATE 125 MG/2ML
125 INJECTION, POWDER, LYOPHILIZED, FOR SOLUTION INTRAMUSCULAR; INTRAVENOUS ONCE
Status: CANCELLED | OUTPATIENT
Start: 2019-03-25 | End: 2019-03-25

## 2019-03-25 RX ORDER — 0.9 % SODIUM CHLORIDE 0.9 %
10 VIAL (ML) INJECTION ONCE
Status: CANCELLED | OUTPATIENT
Start: 2019-03-25 | End: 2019-03-25

## 2019-03-25 RX ORDER — HEPARIN SODIUM (PORCINE) LOCK FLUSH IV SOLN 100 UNIT/ML 100 UNIT/ML
500 SOLUTION INTRAVENOUS PRN
Status: CANCELLED | OUTPATIENT
Start: 2019-03-25

## 2019-03-25 RX ORDER — SODIUM CHLORIDE 0.9 % (FLUSH) 0.9 %
10 SYRINGE (ML) INJECTION PRN
Status: CANCELLED | OUTPATIENT
Start: 2019-03-25

## 2019-03-25 RX ORDER — PALONOSETRON 0.05 MG/ML
0.25 INJECTION, SOLUTION INTRAVENOUS ONCE
Status: COMPLETED | OUTPATIENT
Start: 2019-03-25 | End: 2019-03-25

## 2019-03-25 RX ORDER — SODIUM CHLORIDE 9 MG/ML
INJECTION, SOLUTION INTRAVENOUS ONCE
Status: COMPLETED | OUTPATIENT
Start: 2019-03-25 | End: 2019-03-25

## 2019-03-25 RX ORDER — SODIUM CHLORIDE 0.9 % (FLUSH) 0.9 %
5 SYRINGE (ML) INJECTION PRN
Status: CANCELLED | OUTPATIENT
Start: 2019-03-25

## 2019-03-25 RX ORDER — DIPHENHYDRAMINE HYDROCHLORIDE 50 MG/ML
50 INJECTION INTRAMUSCULAR; INTRAVENOUS ONCE
Status: CANCELLED | OUTPATIENT
Start: 2019-03-25 | End: 2019-03-25

## 2019-03-25 RX ORDER — SODIUM CHLORIDE 9 MG/ML
INJECTION, SOLUTION INTRAVENOUS CONTINUOUS
Status: CANCELLED | OUTPATIENT
Start: 2019-03-25

## 2019-03-25 RX ADMIN — CISPLATIN: 1 INJECTION, SOLUTION INTRAVENOUS at 14:15

## 2019-03-25 RX ADMIN — DEXAMETHASONE SODIUM PHOSPHATE 12 MG: 4 INJECTION, SOLUTION INTRAMUSCULAR; INTRAVENOUS at 13:45

## 2019-03-25 RX ADMIN — POTASSIUM CHLORIDE: 2 INJECTION, SOLUTION, CONCENTRATE INTRAVENOUS at 12:00

## 2019-03-25 RX ADMIN — SODIUM CHLORIDE: 9 INJECTION, SOLUTION INTRAVENOUS at 11:50

## 2019-03-25 RX ADMIN — POTASSIUM CHLORIDE: 2 INJECTION, SOLUTION, CONCENTRATE INTRAVENOUS at 16:25

## 2019-03-25 RX ADMIN — PALONOSETRON 0.25 MG: 0.05 INJECTION, SOLUTION INTRAVENOUS at 14:13

## 2019-03-25 RX ADMIN — SODIUM CHLORIDE 150 MG: 9 INJECTION, SOLUTION INTRAVENOUS at 13:10

## 2019-03-25 ASSESSMENT — PAIN SCALES - GENERAL: PAINLEVEL_OUTOF10: 2

## 2019-03-25 ASSESSMENT — PAIN DESCRIPTION - FREQUENCY: FREQUENCY: INTERMITTENT

## 2019-03-25 ASSESSMENT — PAIN DESCRIPTION - PROGRESSION: CLINICAL_PROGRESSION: NOT CHANGED

## 2019-03-25 ASSESSMENT — PAIN DESCRIPTION - LOCATION: LOCATION: ABDOMEN

## 2019-03-25 ASSESSMENT — PAIN DESCRIPTION - DESCRIPTORS: DESCRIPTORS: ACHING

## 2019-03-25 ASSESSMENT — PAIN DESCRIPTION - PAIN TYPE: TYPE: CHRONIC PAIN

## 2019-03-25 NOTE — ONCOLOGY
Chemotherapy Administration    Pre-assessment Data: Antineoplastic Agents  Other:   See toxicity flow sheet for assessment [x]     Physician Notification of Concerns Related to Chemotherapy Administration:   Physician Notified Fredia Ferndale / Time of Notification Dr Julio C Oquendo seen today.      Interventions:   Lab work assessed  [x]   Height / Weight verified for dose [x]   Current MAR reviewed [x]   Emergency drugs available as appropriate [x]   Anaphylaxis assessment completed [x]   Pre-medications administered as ordered [x]   Blood return noted upon initiation of chemotherapy [x]   Blood return noted each 1-2ml of a vesicant medication if given IV push []   Blood return noted each 2-3ml of a non-vesicant medication if given IV push []   Monitor for signs / symptoms of hypersensitivity reaction [x]   Chemotherapy orders (drug/dose/rate) verified by 2 Chemo certified RNs [x]   Monitor IV site and blood return throughout the infusion of the medication [x]   Document IV site checks on the IV assessment form [x]   Document chemotherapy teaching on the Patient Education tab [x]   Document patient verbalizes understanding of medications being administered [x]   If IV infiltration, see ONS Guidelines []   Other:   cisplatin   [x]

## 2019-03-25 NOTE — PROGRESS NOTES
Name: Shaq Venegas  : 1945  MRN: [id number]    Oncology Navigation- Initial Note:    Intake-  Contact Type: Medical Oncology    Diagnosis: - Bladder Ca- High Grade Urothelial Carcinoma    Home Disposition: Lives alone; [address] can assist if needing help. Independent in self care, & home maintenance. Pt cooks, clean & drives. NO services at this time. Patient needs and barriers to care: Nio Barriers Identified     Referral Source: Outpatient    Receptive to Advanced Care Planning/ Palliative Care:  Deferred    Interventions-   General Interventions: Nitesh program discussed. Nitesh folder reviewed, including contact information. Education/Screenings:  yes - Treatment side effects recapped by ONC. Pt denies hearing changes, & any neuropathy. +hard-pellet like stool-> Encouraged to increase fluid intake. RX Antinausea med     Currently on HRT: no     Referrals:  Outpatient Appointment ->ESTABLISHED- Concurrent XRT & Chemotherapy     Biopsy site status: NA       Continuum of Care: Diagnosis/Active Treatment    Notes: Cycle #2 Chemo today- Cisplatin;  XRT # Fxs       Electronically signed by Kisha Fowler RN on 3/25/2019 at 1:22 PM

## 2019-03-25 NOTE — PROGRESS NOTES
Patient assessed for the following post chemotherapy:    Dizziness   No  Lightheadedness  No     Acute nausea/vomiting No  Headache   No  Chest pain/pressure  No  Rash/itching   No  Shortness of breath  No    Patient kept for 20 minutes observation post infusion chemotherapy. Patient tolerated chemotherapy treatment cisplatin without any complications. Last vital signs:   BP (!) 142/68   Pulse 66   Temp 98.4 °F (36.9 °C) (Oral)   Resp 16   Ht 5' 7\" (1.702 m)   Wt 197 lb 3.2 oz (89.4 kg)   SpO2 94%   BMI 30.89 kg/m²     Patient instructed if experience any of the above symptoms following today's infusion,he/she is to notify MD immediately or go to the emergency department. Discharge instructions given to patient. Verbalizes understanding. Ambulated off unit per self  with belongings.

## 2019-03-25 NOTE — PLAN OF CARE
Problem: Pain:  Goal: Pain level will decrease  Description  Pain level will decrease  Outcome: Met This Shift  Goal: Control of acute pain  Description  Control of acute pain  Outcome: Met This Shift  Goal: Control of chronic pain  Description  Control of chronic pain  Outcome: Met This Shift  Note:   Patient rating pain a 2 out of 10 using WILY scale, Pain located in lower abdomen. Intervention: Opioid analgesia side-effects  Note:   Patient encouraged to take prescribed pain medications and call Physician if pain is not controlled. Problem: Intellectual/Education/Knowledge Deficit  Goal: Teaching initiated upon admission  Outcome: Met This Shift  Note:   Patient verbalizes understanding to verbal information given on cisplatin,action and possible side effects. Aware to call MD if develop complications. Intervention: Verbal/written education provided  Note:   Chemotherapy Teaching     What is Chemotherapy   Drug action ? Method of Administration ? Handouts given ? Side Effects  Nausea/vomiting ? Diarrhea ? Fatigue ? Signs / Symptoms of infection ? Neutropenia ? Thrombocytopenia ? Alopecia ? neuropathy ? [place] diet &  the importance of fluids ? Micellaneous  Importance of nutrition ? Importance of oral hygiene ? When to call the MD ?   Monitoring labs ? Use of supportive services ? Explanation of Drug Regimen / Frequency  Cisplatin day#1 cycle#2     Comments  Verbalized understanding to drug,action,side effects and when to call MD         Problem: Discharge Planning  Goal: Knowledge of discharge instructions  Description  Knowledge of discharge instructions     Outcome: Met This Shift  Note:   Verbalize understanding of discharge instructions, follow up appointments, and when to call Physician. Intervention: Interaction with patient/family and care team  Note:   Provide discharge instructions.       Problem: Musculor/Skeletal Functional Status  Goal: Absence of falls  Outcome: Met This Shift  Note:   Free from falls while in O.P. Oncology. Intervention: Provide standby assistance  Note:   Discussed the need to use the call light for assistance when getting up to ambulate. Call light within reach. Care plan reviewed with patient. Patient verbalize understanding of the plan of care and contribute to goal setting.

## 2019-03-26 PROCEDURE — 77386 HC NTSTY MODUL RAD TX DLVR CPLX: CPT | Performed by: RADIOLOGY

## 2019-03-27 ENCOUNTER — CLINICAL DOCUMENTATION (OUTPATIENT)
Dept: NUTRITION | Age: 74
End: 2019-03-27

## 2019-03-27 PROCEDURE — 77386 HC NTSTY MODUL RAD TX DLVR CPLX: CPT | Performed by: RADIOLOGY

## 2019-03-28 PROCEDURE — 77386 HC NTSTY MODUL RAD TX DLVR CPLX: CPT | Performed by: RADIOLOGY

## 2019-03-29 PROCEDURE — 77386 HC NTSTY MODUL RAD TX DLVR CPLX: CPT | Performed by: RADIOLOGY

## 2019-03-31 RX ORDER — SODIUM CHLORIDE 9 MG/ML
INJECTION, SOLUTION INTRAVENOUS CONTINUOUS
Status: CANCELLED | OUTPATIENT
Start: 2019-04-01

## 2019-03-31 RX ORDER — METHYLPREDNISOLONE SODIUM SUCCINATE 125 MG/2ML
125 INJECTION, POWDER, LYOPHILIZED, FOR SOLUTION INTRAMUSCULAR; INTRAVENOUS ONCE
Status: CANCELLED | OUTPATIENT
Start: 2019-04-01

## 2019-03-31 RX ORDER — 0.9 % SODIUM CHLORIDE 0.9 %
10 VIAL (ML) INJECTION ONCE
Status: CANCELLED | OUTPATIENT
Start: 2019-04-01

## 2019-03-31 RX ORDER — SODIUM CHLORIDE 0.9 % (FLUSH) 0.9 %
5 SYRINGE (ML) INJECTION PRN
Status: CANCELLED | OUTPATIENT
Start: 2019-04-01

## 2019-03-31 RX ORDER — SODIUM CHLORIDE 9 MG/ML
INJECTION, SOLUTION INTRAVENOUS ONCE
Status: CANCELLED | OUTPATIENT
Start: 2019-04-01

## 2019-03-31 RX ORDER — SODIUM CHLORIDE 0.9 % (FLUSH) 0.9 %
10 SYRINGE (ML) INJECTION PRN
Status: CANCELLED | OUTPATIENT
Start: 2019-04-01

## 2019-03-31 RX ORDER — HEPARIN SODIUM (PORCINE) LOCK FLUSH IV SOLN 100 UNIT/ML 100 UNIT/ML
500 SOLUTION INTRAVENOUS PRN
Status: CANCELLED | OUTPATIENT
Start: 2019-04-01

## 2019-03-31 RX ORDER — DIPHENHYDRAMINE HYDROCHLORIDE 50 MG/ML
50 INJECTION INTRAMUSCULAR; INTRAVENOUS ONCE
Status: CANCELLED | OUTPATIENT
Start: 2019-04-01

## 2019-03-31 RX ORDER — PALONOSETRON 0.05 MG/ML
0.25 INJECTION, SOLUTION INTRAVENOUS ONCE
Status: CANCELLED | OUTPATIENT
Start: 2019-04-01

## 2019-03-31 ASSESSMENT — ENCOUNTER SYMPTOMS
FACIAL SWELLING: 0
COLOR CHANGE: 0
CONSTIPATION: 0
EYE DISCHARGE: 0
COLOR CHANGE: 0
TROUBLE SWALLOWING: 0
SHORTNESS OF BREATH: 0
WHEEZING: 0
SHORTNESS OF BREATH: 0
BACK PAIN: 0
VOMITING: 0
EYE DISCHARGE: 0
RECTAL PAIN: 0
COUGH: 0
TROUBLE SWALLOWING: 0
DIARRHEA: 0
RECTAL PAIN: 0
SORE THROAT: 0
ABDOMINAL PAIN: 0
ABDOMINAL PAIN: 0
BACK PAIN: 0
NAUSEA: 0
VOMITING: 0
WHEEZING: 0
DIARRHEA: 0
SORE THROAT: 0
CONSTIPATION: 0
COUGH: 0
ABDOMINAL DISTENTION: 0
CHEST TIGHTNESS: 0
BLOOD IN STOOL: 0
CHEST TIGHTNESS: 0
NAUSEA: 0
BLOOD IN STOOL: 0
FACIAL SWELLING: 0
ABDOMINAL DISTENTION: 0

## 2019-04-01 ENCOUNTER — OFFICE VISIT (OUTPATIENT)
Dept: ONCOLOGY | Age: 74
End: 2019-04-01
Payer: MEDICARE

## 2019-04-01 ENCOUNTER — HOSPITAL ENCOUNTER (OUTPATIENT)
Dept: INFUSION THERAPY | Age: 74
Discharge: HOME | End: 2019-04-01
Payer: MEDICARE

## 2019-04-01 ENCOUNTER — HOSPITAL ENCOUNTER (OUTPATIENT)
Dept: RADIATION ONCOLOGY | Age: 74
Discharge: HOME | End: 2019-04-01
Payer: MEDICARE

## 2019-04-01 VITALS
DIASTOLIC BLOOD PRESSURE: 61 MMHG | SYSTOLIC BLOOD PRESSURE: 134 MMHG | BODY MASS INDEX: 30.48 KG/M2 | HEART RATE: 72 BPM | HEIGHT: 67 IN | RESPIRATION RATE: 18 BRPM | OXYGEN SATURATION: 96 % | WEIGHT: 194.2 LBS | TEMPERATURE: 97.5 F

## 2019-04-01 VITALS
SYSTOLIC BLOOD PRESSURE: 140 MMHG | RESPIRATION RATE: 18 BRPM | BODY MASS INDEX: 30.48 KG/M2 | HEART RATE: 75 BPM | HEIGHT: 67 IN | DIASTOLIC BLOOD PRESSURE: 62 MMHG | TEMPERATURE: 97.3 F | WEIGHT: 194.2 LBS | OXYGEN SATURATION: 98 %

## 2019-04-01 DIAGNOSIS — Z51.11 ENCOUNTER FOR CHEMOTHERAPY MANAGEMENT: ICD-10-CM

## 2019-04-01 DIAGNOSIS — D49.4 BLADDER TUMOR: Primary | ICD-10-CM

## 2019-04-01 DIAGNOSIS — C67.9 UROTHELIAL CARCINOMA OF BLADDER (HCC): ICD-10-CM

## 2019-04-01 DIAGNOSIS — R31.0 GROSS HEMATURIA: ICD-10-CM

## 2019-04-01 DIAGNOSIS — C67.8 MALIGNANT NEOPLASM OF OVERLAPPING SITES OF BLADDER (HCC): ICD-10-CM

## 2019-04-01 LAB
ALBUMIN SERPL-MCNC: 3.4 G/DL (ref 3.5–5.1)
ALP BLD-CCNC: 118 U/L (ref 38–126)
ALT SERPL-CCNC: 12 U/L (ref 11–66)
AST SERPL-CCNC: 13 U/L (ref 5–40)
BACTERIA: ABNORMAL /HPF
BILIRUB SERPL-MCNC: 0.4 MG/DL (ref 0.3–1.2)
BILIRUBIN DIRECT: < 0.2 MG/DL (ref 0–0.3)
BILIRUBIN URINE: NEGATIVE
BLOOD, URINE: NEGATIVE
BUN, WHOLE BLOOD: 11 MG/DL (ref 8–26)
CASTS 2: ABNORMAL /LPF
CASTS UA: ABNORMAL /LPF
CHARACTER, URINE: ABNORMAL
CHLORIDE, WHOLE BLOOD: 98 MEQ/L (ref 98–109)
COLOR: YELLOW
CREATININE, WHOLE BLOOD: 0.7 MG/DL (ref 0.5–1.2)
CRYSTALS, UA: ABNORMAL
EPITHELIAL CELLS, UA: ABNORMAL /HPF
GFR, ESTIMATED: > 90 ML/MIN/1.73M2
GLUCOSE URINE: NEGATIVE MG/DL
GLUCOSE, WHOLE BLOOD: 178 MG/DL (ref 70–108)
HCT VFR BLD CALC: 41.2 % (ref 42–52)
HEMOGLOBIN: 14.2 GM/DL (ref 14–18)
IONIZED CALCIUM, WHOLE BLOOD: 1.19 MMOL/L (ref 1.12–1.32)
KETONES, URINE: NEGATIVE
LEUKOCYTE ESTERASE, URINE: ABNORMAL
MCH RBC QN AUTO: 30 PG (ref 27–31)
MCHC RBC AUTO-ENTMCNC: 34.3 GM/DL (ref 33–37)
MCV RBC AUTO: 87 FL (ref 80–94)
MISCELLANEOUS 2: ABNORMAL
NITRITE, URINE: NEGATIVE
PDW BLD-RTO: 11.8 % (ref 11.5–14.5)
PH UA: 7 (ref 5–9)
PLATELET # BLD: 146 THOU/MM3 (ref 130–400)
PMV BLD AUTO: 6.8 FL (ref 7.4–10.4)
POTASSIUM, WHOLE BLOOD: 4.4 MEQ/L (ref 3.5–4.9)
PROTEIN UA: ABNORMAL
RBC # BLD: 4.72 MILL/MM3 (ref 4.7–6.1)
RBC URINE: ABNORMAL /HPF
RENAL EPITHELIAL, UA: ABNORMAL
SEG NEUTROPHILS: 82 % (ref 43–75)
SEGMENTED NEUTROPHILS ABSOLUTE COUNT: 6.6 THOU/MM3 (ref 1.8–7.7)
SODIUM, WHOLE BLOOD: 140 MEQ/L (ref 138–146)
SPECIFIC GRAVITY, URINE: 1.02 (ref 1–1.03)
TOTAL CO2, WHOLE BLOOD: 31 MEQ/L (ref 23–33)
TOTAL PROTEIN: 6.3 G/DL (ref 6.1–8)
UROBILINOGEN, URINE: 1 EU/DL (ref 0–1)
WBC # BLD: 8.1 THOU/MM3 (ref 4.8–10.8)
WBC UA: ABNORMAL /HPF
YEAST: ABNORMAL

## 2019-04-01 PROCEDURE — 36415 COLL VENOUS BLD VENIPUNCTURE: CPT

## 2019-04-01 PROCEDURE — 4004F PT TOBACCO SCREEN RCVD TLK: CPT | Performed by: INTERNAL MEDICINE

## 2019-04-01 PROCEDURE — 1123F ACP DISCUSS/DSCN MKR DOCD: CPT | Performed by: INTERNAL MEDICINE

## 2019-04-01 PROCEDURE — 2580000003 HC RX 258: Performed by: INTERNAL MEDICINE

## 2019-04-01 PROCEDURE — 96366 THER/PROPH/DIAG IV INF ADDON: CPT

## 2019-04-01 PROCEDURE — 96413 CHEMO IV INFUSION 1 HR: CPT

## 2019-04-01 PROCEDURE — 87086 URINE CULTURE/COLONY COUNT: CPT

## 2019-04-01 PROCEDURE — 85027 COMPLETE CBC AUTOMATED: CPT

## 2019-04-01 PROCEDURE — 96415 CHEMO IV INFUSION ADDL HR: CPT

## 2019-04-01 PROCEDURE — 77336 RADIATION PHYSICS CONSULT: CPT | Performed by: RADIOLOGY

## 2019-04-01 PROCEDURE — G8427 DOCREV CUR MEDS BY ELIG CLIN: HCPCS | Performed by: INTERNAL MEDICINE

## 2019-04-01 PROCEDURE — 80047 BASIC METABLC PNL IONIZED CA: CPT

## 2019-04-01 PROCEDURE — 81001 URINALYSIS AUTO W/SCOPE: CPT

## 2019-04-01 PROCEDURE — 77386 HC NTSTY MODUL RAD TX DLVR CPLX: CPT | Performed by: RADIOLOGY

## 2019-04-01 PROCEDURE — 96367 TX/PROPH/DG ADDL SEQ IV INF: CPT

## 2019-04-01 PROCEDURE — 2709999900 HC NON-CHARGEABLE SUPPLY

## 2019-04-01 PROCEDURE — G0463 HOSPITAL OUTPT CLINIC VISIT: HCPCS

## 2019-04-01 PROCEDURE — 6360000002 HC RX W HCPCS: Performed by: INTERNAL MEDICINE

## 2019-04-01 PROCEDURE — 4040F PNEUMOC VAC/ADMIN/RCVD: CPT | Performed by: INTERNAL MEDICINE

## 2019-04-01 PROCEDURE — 3017F COLORECTAL CA SCREEN DOC REV: CPT | Performed by: INTERNAL MEDICINE

## 2019-04-01 PROCEDURE — G8417 CALC BMI ABV UP PARAM F/U: HCPCS | Performed by: INTERNAL MEDICINE

## 2019-04-01 PROCEDURE — 96375 TX/PRO/DX INJ NEW DRUG ADDON: CPT

## 2019-04-01 PROCEDURE — 99214 OFFICE O/P EST MOD 30 MIN: CPT | Performed by: INTERNAL MEDICINE

## 2019-04-01 PROCEDURE — 80076 HEPATIC FUNCTION PANEL: CPT

## 2019-04-01 RX ORDER — PALONOSETRON 0.05 MG/ML
0.25 INJECTION, SOLUTION INTRAVENOUS ONCE
Status: COMPLETED | OUTPATIENT
Start: 2019-04-01 | End: 2019-04-01

## 2019-04-01 RX ORDER — DEXAMETHASONE 4 MG/1
8 TABLET ORAL
Qty: 6 TABLET | Refills: 3 | Status: SHIPPED | OUTPATIENT
Start: 2019-04-01 | End: 2019-04-04

## 2019-04-01 RX ORDER — SODIUM CHLORIDE 9 MG/ML
INJECTION, SOLUTION INTRAVENOUS ONCE
Status: COMPLETED | OUTPATIENT
Start: 2019-04-01 | End: 2019-04-01

## 2019-04-01 RX ADMIN — POTASSIUM CHLORIDE: 2 INJECTION, SOLUTION, CONCENTRATE INTRAVENOUS at 15:10

## 2019-04-01 RX ADMIN — POTASSIUM CHLORIDE: 2 INJECTION, SOLUTION, CONCENTRATE INTRAVENOUS at 10:44

## 2019-04-01 RX ADMIN — MANNITOL: 250 INJECTION, SOLUTION INTRAVENOUS at 13:05

## 2019-04-01 RX ADMIN — DEXAMETHASONE SODIUM PHOSPHATE 12 MG: 4 INJECTION, SOLUTION INTRAMUSCULAR; INTRAVENOUS at 12:40

## 2019-04-01 RX ADMIN — SODIUM CHLORIDE 150 MG: 9 INJECTION, SOLUTION INTRAVENOUS at 11:55

## 2019-04-01 RX ADMIN — SODIUM CHLORIDE: 9 INJECTION, SOLUTION INTRAVENOUS at 10:35

## 2019-04-01 RX ADMIN — PALONOSETRON 0.25 MG: 0.05 INJECTION, SOLUTION INTRAVENOUS at 12:38

## 2019-04-01 NOTE — PROGRESS NOTES
Patient assessed for the following post chemotherapy:    Dizziness   No  Lightheadedness  No     Acute nausea/vomiting No  Headache   No  Chest pain/pressure  No  Rash/itching   No  Shortness of breath  No    Patient kept for 20 minutes observation post infusion chemotherapy. Patient tolerated chemotherapy treatment cisplatin without any complications. UA with reflex sen to lab. Last vital signs:   /61   Pulse 72   Temp 97.5 °F (36.4 °C) (Oral)   Resp 18   Ht 5' 7\" (1.702 m)   Wt 194 lb 3.2 oz (88.1 kg)   SpO2 96%   BMI 30.42 kg/m²     Patient instructed if experience any of the above symptoms following today's infusion,he/she is to notify MD immediately or go to the emergency department. Discharge instructions given to patient. Verbalizes understanding. Ambulated off unit per self with cane with belongings.

## 2019-04-01 NOTE — PATIENT INSTRUCTIONS
1. Proceed with cycle #3 of weekly cisplatin today and next week.   2.  RTC to see me, for labs: CBC, BMP, LFTs and next cycle of cisplatin in 2 weeks Satisfactory

## 2019-04-01 NOTE — PLAN OF CARE
Problem: Intellectual/Education/Knowledge Deficit  Goal: Teaching initiated upon admission  Outcome: Met This Shift  Note:   Patient verbalizes understanding to verbal information given on cisplatin,action and possible side effects. Aware to call MD if develop complications. Intervention: Verbal/written education provided  Note:   Chemotherapy Teaching     What is Chemotherapy   Drug action ? Method of Administration ? Handouts given ? Side Effects  Nausea/vomiting ? Diarrhea ? Fatigue ? Signs / Symptoms of infection ? Neutropenia ? Thrombocytopenia ? Alopecia ? neuropathy ? Dunn diet &  the importance of fluids ? Micellaneous  Importance of nutrition ? Importance of oral hygiene ? When to call the MD ?   Monitoring labs ? Use of supportive services ? Explanation of Drug Regimen / Frequency  Cisplatin day#1 cycle#3     Comments  Verbalized understanding to drug,action,side effects and when to call MD         Problem: Discharge Planning  Goal: Knowledge of discharge instructions  Description  Knowledge of discharge instructions     Outcome: Met This Shift  Note:   Verbalize understanding of discharge instructions, follow up appointments, and when to call Physician. Intervention: Interaction with patient/family and care team  Note:   Provide discharge instructions. Problem: Musculor/Skeletal Functional Status  Goal: Absence of falls  Outcome: Met This Shift  Note:   Free from falls while in O.P. Oncology. Intervention: Provide standby assistance  Note:   Discussed the need to use the call light for assistance when getting up to ambulate. Call light within reach. Care plan reviewed with patient. Patient verbalize understanding of the plan of care and contribute to goal setting.

## 2019-04-01 NOTE — ONCOLOGY
Chemotherapy Administration    Pre-assessment Data: Antineoplastic Agents  Other:   See toxicity flow sheet for assessment [x]     Physician Notification of Concerns Related to Chemotherapy Administration:   Physician Notified Phani Diane / Time of Notification Dr Mustapha Chacon seen today.      Interventions:   Lab work assessed  [x]   Height / Weight verified for dose [x]   Current MAR reviewed [x]   Emergency drugs available as appropriate [x]   Anaphylaxis assessment completed [x]   Pre-medications administered as ordered [x]   Blood return noted upon initiation of chemotherapy [x]   Blood return noted each 1-2ml of a vesicant medication if given IV push []   Blood return noted each 2-3ml of a non-vesicant medication if given IV push []   Monitor for signs / symptoms of hypersensitivity reaction [x]   Chemotherapy orders (drug/dose/rate) verified by 2 Chemo certified RNs [x]   Monitor IV site and blood return throughout the infusion of the medication [x]   Document IV site checks on the IV assessment form [x]   Document chemotherapy teaching on the Patient Education tab [x]   Document patient verbalizes understanding of medications being administered [x]   If IV infiltration, see ONS Guidelines []   Other: cisplatin   [x]

## 2019-04-01 NOTE — PROGRESS NOTES
Name: Greyson Castano  : 1945  MRN: [id number]    Oncology Navigation Follow-Up Note    Contact Type:  Medical Oncology- Bladder Ca    Subjective: Here to see Doctor & get treatment. Objective: WBC 8.1  HGB 14.2    Segs 6.6  BUN 11  Crea 0.7   MRI Pelvis 3/5/19:  1. There is a diverticulum extending from the posterior margin of the right lateral wall the urinary bladder with an associated enhancing mass lesion. 2. The diverticulum including the mass lesion measures 6.0 x 3.7 x 3.9 cm in longitudinal, transverse and AP dimensions. The diverticulum excluding the mass lesion measures 3.6 x 3.1 x 3.9 cm in longitudinal, transverse and AP dimensions. The    diverticular mass measures 4.3 x 3.3 x 3.9 cm.   3. The enhancing mass involves the distal margins of the lateral wall of the diverticulum and also the posterior margin of the lateral wall of the urinary bladder adjacent to this region. This mass is low signal intensity on the T1 and T2-weighted    imaging and diffusely enhances on the postcontrast imaging. This mass is consistent with the known bladder carcinoma. There is mild irregularity of the outer wall of the mass consistent with subserosal extension. There are also enhancing stranding    densities within the fat adjacent to this which can be associated with interstitial spread of carcinoma. There is a 0.8 x 0.7 cm lymph node adjacent to the right internal iliac artery however no pathologically enlarged lymphadenopathy is identified.               Slight urinary burning with bleeding x1 day after last treatment. Proceed with treatment today as planned:  Cycle #3:  Cisplatin   Continue XRT      Assistance Needed: Denies; tolerating tx well. Receptive to Advanced Care Planning / Palliative Care:  deferred    Referrals: N/A    Education: UA ordered.      Notes: Cycle #3 Cisplatin;  XRT #10/36 FXS       Electronically signed by Ale Naqvi RN on 2019 at 3:52 PM

## 2019-04-01 NOTE — PROGRESS NOTES
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     SINCE CHILDHOOD    Thyroid disease     Type 2 diabetes mellitus without complication (HCC)       Past Surgical History:   Procedure Laterality Date    CYSTOSCOPY N/A 1/30/2019    TRANSURETHRAL RESECTION BLADDER TUMOR, CYSTOLITHOLAPAXY performed by Hortencia Shelton MD at [address] [place] [address]        Family History   Problem Relation Age of Onset    Other Mother         BRAIN TUMOR    Heart Disease Father       Social History     Tobacco Use    Smoking status: Current Every Day Smoker     Packs/day: 1.50     Years: 53.00     Pack years: 79.50     Types: Cigarettes    Smokeless tobacco: Never Used    Tobacco comment: depending on tumor analysis   Substance Use Topics    Alcohol use: No     Comment: not in 40 years      Current Outpatient Medications   Medication Sig Dispense Refill    dexamethasone (DECADRON) 4 MG tablet Take 2 tablets by mouth daily (with breakfast) for 3 doses 6 tablet 3    oxybutynin (DITROPAN) 5 MG tablet Take 1 tablet by mouth 3 times daily as needed (stent pain, bladder spasms) 30 tablet 0    diclofenac (VOLTAREN) 50 MG EC tablet Take 1 tablet by mouth 2 times daily 60 tablet 3    phenazopyridine (PYRIDIUM) 100 MG tablet Take 1 tablet by mouth 3 times daily as needed for Pain 12 tablet 0    linagliptin (TRADJENTA) 5 MG tablet Take 5 mg by mouth daily      Multiple Vitamins-Minerals ([employer] SPECTRAVITE ADULT 50+ PO) Take by mouth      Saw Cambria 450 MG CAPS Take by mouth daily Indications: 3-4 times per week PRN       atorvastatin (LIPITOR) 40 MG tablet Take 40 mg by mouth daily      metFORMIN (GLUCOPHAGE) 500 MG tablet Take 1,000 mg by mouth 2 times daily (with meals)       levothyroxine (SYNTHROID) 125 MCG tablet Take 125 mcg by mouth Daily      prednisoLONE acetate (PRED FORTE) 1 % ophthalmic suspension 1 drop 4 times daily      timolol (BETIMOL) 0.5 % ophthalmic rash and wound. Neurological: Negative for dizziness, tremors, seizures, speech difficulty, weakness, light-headedness, numbness and headaches. Hematological: Negative for adenopathy. Does not bruise/bleed easily. Psychiatric/Behavioral: Negative for confusion and sleep disturbance. The patient is not nervous/anxious. Objective:   Physical Exam   Constitutional: He is oriented to person, place, and time. He appears well-developed and well-nourished. No distress. HENT:   Head: Normocephalic. Mouth/Throat: Oropharynx is clear and moist. No oropharyngeal exudate. Eyes: Pupils are equal, round, and reactive to light. EOM are normal. Right eye exhibits no discharge. Left eye exhibits no discharge. No scleral icterus. Neck: Normal range of motion. Neck supple. No JVD present. No tracheal deviation present. No thyromegaly present. Cardiovascular: Normal rate and normal heart sounds. Exam reveals no gallop and no friction rub. No murmur heard. Pulmonary/Chest: Effort normal and breath sounds normal. No stridor. No respiratory distress. He has no wheezes. He has no rales. He exhibits no tenderness. Abdominal: Soft. Bowel sounds are normal. He exhibits no distension and no mass. There is no tenderness. There is no rebound. Musculoskeletal: Normal range of motion. He exhibits no edema. Good range of motion in all four extremities. Lymphadenopathy:     He has no cervical adenopathy. Neurological: He is alert and oriented to person, place, and time. He has normal reflexes. No cranial nerve deficit. He exhibits normal muscle tone. Skin: Skin is warm. No rash noted. No erythema. Psychiatric: He has a normal mood and affect. His behavior is normal. Judgment and thought content normal.   Vitals reviewed.      BP (!) 140/62 (Site: Left Upper Arm, Position: Sitting, Cuff Size: Large Adult)   Pulse 75   Temp 97.3 °F (36.3 °C) (Tympanic)   Resp 18   Ht 5' 7\" (1.702 m)   Wt 194 lb 3.2 oz (88.1 kg)   SpO2 98%   BMI 30.42 kg/m²      ECOG status is 0. Imaging studies and labs:     CT of the abdomen on December 13, 2018 showed: There is a right posterior lateral bladder diverticulum which contains both enhancing soft tissue as well as a large calculus. There is a 17 mm bladder calculus in addition there is irregular thickening of the right posterior lateral bladder    wall and these findings are highly suspicious for the presence of neoplasia         CT of the chest on January 4, 2019 showed:  1.  Bochdalek hernia with resultant intrathoracic stomach and large amount of mesenteric fat in the left hemithorax. 2. 5 mm noncalcified nodule right middle lobe. One-year follow-up recommended. Lab Results   Component Value Date    WBC 8.1 04/01/2019    HGB 14.2 04/01/2019    HCT 41.2 (L) 04/01/2019    MCV 87 04/01/2019     04/01/2019       Chemistry        Component Value Date/Time     04/01/2019 0928     01/31/2019 0814    K 4.4 04/01/2019 0928    K 4.2 01/31/2019 0814     01/31/2019 0814    CO2 22 (L) 01/31/2019 0814    BUN 9 01/31/2019 0814    CREATININE 0.7 04/01/2019 0928    CREATININE 0.7 01/31/2019 0814        Component Value Date/Time    CALCIUM 8.1 (L) 01/31/2019 0814    ALKPHOS 118 04/01/2019 0928    AST 13 04/01/2019 0928    ALT 12 04/01/2019 0928    BILITOT 0.4 04/01/2019 0928        MRI of the pelvis on March 5, 2019 showed:  1. There is a diverticulum extending from the posterior margin of the right lateral wall the urinary bladder with an associated enhancing mass lesion. 2. The diverticulum including the mass lesion measures 6.0 x 3.7 x 3.9 cm in longitudinal, transverse and AP dimensions. The diverticulum excluding the mass lesion measures 3.6 x 3.1 x 3.9 cm in longitudinal, transverse and AP dimensions.  The    diverticular mass measures 4.3 x 3.3 x 3.9 cm.   3. The enhancing mass involves the distal margins of the lateral wall of the diverticulum and also the posterior margin of the lateral wall of the urinary bladder adjacent to this region. This mass is low signal intensity on the T1 and T2-weighted          Assessment:        Diagnosis Orders   1. Bladder tumor  Urine Rt Reflex To Culture   2. Encounter for chemotherapy management     3. Urothelial carcinoma of bladder ([facility] [address]     4. Gross hematuria          Plan:   1. 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   I discuss with the patient the benefit of neoadjuvant chemotherapy, the schedule and possible side effects of treatment. Despite radical cystectomy, approximately one-half of patients with muscle-invasive urothelial bladder cancer involving the muscularis propria will develop metastatic disease within two years. Therefore the preferred management of patients with muscle-invasive bladder cancer consists of a multimodal approach comprising neoadjuvant chemotherapy followed by radical cystectomy. This approach is supported by  evidence that neoadjuvant cisplatin-based chemotherapy improves survival compared with locoregional treatment alone. A 2003 meta-analysis of 11 randomized trials that compared cisplatin-based neoadjuvant chemotherapy plus local therapy versus local therapy alone showed an improvement in overall survival (5-year OS 50 versus 45%) and a lower risk of recurrence (HR for recurrence 0.81). Choice of chemotherapy regimens include MVAC for healthy patients younger than 79years of age. In older patients and those unable to tolerate this combination due to medical comorbidities, gemcitabine plus cisplatin (GC) is a reasonable alternative. The patient is quite functional, without contraindications for cisplatin, therefore he should be able to tolerate the gemcitabine and cisplatin.   He had MRI of the pelvis that showed a diverticulum extending from the posterior margin of the right lateral wall the urinary bladder with an associated enhancing mass lesion which measures 3.6 x 3.1 x 3.9 cm in size. The patient refused to have surgery. During original visit we discussed the alternative options. I explained to him that the only potentially curative option is surgery. For patients who are  medically inoperable due to comorbidity and frailty, chemoradiation may be a reasonable alternative. Some elderly patients with invasive bladder cancer can be cured by cisplatin-based regimen and radiation therapy or at least effectively palliated for sustained periods measured in months to years. The patient understand that this is not the preferred and recommended approach, but he decided to proceed with this therapy. 2.  Concurrent chemoradiation. He tolerated 2nd infusion of cisplatin well. He denies having any side effects. Mild tiredness. His vital signs are stable, his labs are within normal limits, we'll proceed with third weekly infusion today  The patient was instructed to continue Decadron 8 mg po daily on days 2-4 for acute CIV. He was also instructed to use compazine PRN. Return in about 2 weeks (around 4/15/2019). Orders Placed:   Orders Placed This Encounter   Procedures    Urine Rt Reflex To Culture     Order Specific Question:   SPECIFY(EX-CATH,MIDSTREAM,CYSTO,ETC)?      Answer:   clean catch        Medications Prescribed:   Orders Placed This Encounter   Medications    dexamethasone (DECADRON) 4 MG tablet     Sig: Take 2 tablets by mouth daily (with breakfast) for 3 doses     Dispense:  6 tablet     Refill:  3

## 2019-04-02 PROCEDURE — 77386 HC NTSTY MODUL RAD TX DLVR CPLX: CPT | Performed by: RADIOLOGY

## 2019-04-03 LAB — URINE CULTURE REFLEX: NORMAL

## 2019-04-03 PROCEDURE — 77386 HC NTSTY MODUL RAD TX DLVR CPLX: CPT | Performed by: RADIOLOGY

## 2019-04-04 ENCOUNTER — HOSPITAL ENCOUNTER (OUTPATIENT)
Dept: CT IMAGING | Age: 74
Discharge: HOME | End: 2019-04-04
Payer: MEDICARE

## 2019-04-04 DIAGNOSIS — C67.8 MALIGNANT NEOPLASM OF OVERLAPPING SITES OF BLADDER (HCC): ICD-10-CM

## 2019-04-04 PROCEDURE — 77014 HC CT TREATMENT PLAN: CPT | Performed by: RADIOLOGY

## 2019-04-04 PROCEDURE — 77386 HC NTSTY MODUL RAD TX DLVR CPLX: CPT | Performed by: RADIOLOGY

## 2019-04-04 PROCEDURE — 6360000004 HC RX CONTRAST MEDICATION: Performed by: RADIOLOGY

## 2019-04-04 PROCEDURE — 3209999900 CT GUIDE RADIATION THERAPY NO CHARGE

## 2019-04-04 RX ADMIN — IOPAMIDOL 100 ML: 755 INJECTION, SOLUTION INTRAVENOUS at 14:18

## 2019-04-05 DIAGNOSIS — D49.4 BLADDER TUMOR: Primary | ICD-10-CM

## 2019-04-05 PROCEDURE — 77300 RADIATION THERAPY DOSE PLAN: CPT | Performed by: RADIOLOGY

## 2019-04-07 RX ORDER — HEPARIN SODIUM (PORCINE) LOCK FLUSH IV SOLN 100 UNIT/ML 100 UNIT/ML
500 SOLUTION INTRAVENOUS PRN
Status: CANCELLED | OUTPATIENT
Start: 2019-04-08

## 2019-04-07 RX ORDER — SODIUM CHLORIDE 0.9 % (FLUSH) 0.9 %
5 SYRINGE (ML) INJECTION PRN
Status: CANCELLED | OUTPATIENT
Start: 2019-04-08

## 2019-04-07 RX ORDER — DIPHENHYDRAMINE HYDROCHLORIDE 50 MG/ML
50 INJECTION INTRAMUSCULAR; INTRAVENOUS ONCE
Status: CANCELLED | OUTPATIENT
Start: 2019-04-08

## 2019-04-07 RX ORDER — SODIUM CHLORIDE 0.9 % (FLUSH) 0.9 %
10 SYRINGE (ML) INJECTION PRN
Status: CANCELLED | OUTPATIENT
Start: 2019-04-08

## 2019-04-07 RX ORDER — 0.9 % SODIUM CHLORIDE 0.9 %
10 VIAL (ML) INJECTION ONCE
Status: CANCELLED | OUTPATIENT
Start: 2019-04-08

## 2019-04-07 RX ORDER — METHYLPREDNISOLONE SODIUM SUCCINATE 125 MG/2ML
125 INJECTION, POWDER, LYOPHILIZED, FOR SOLUTION INTRAMUSCULAR; INTRAVENOUS ONCE
Status: CANCELLED | OUTPATIENT
Start: 2019-04-08

## 2019-04-07 RX ORDER — SODIUM CHLORIDE 9 MG/ML
INJECTION, SOLUTION INTRAVENOUS CONTINUOUS
Status: CANCELLED | OUTPATIENT
Start: 2019-04-08

## 2019-04-08 ENCOUNTER — HOSPITAL ENCOUNTER (OUTPATIENT)
Dept: INFUSION THERAPY | Age: 74
Discharge: HOME | End: 2019-04-08
Payer: MEDICARE

## 2019-04-08 VITALS
OXYGEN SATURATION: 94 % | SYSTOLIC BLOOD PRESSURE: 135 MMHG | DIASTOLIC BLOOD PRESSURE: 66 MMHG | HEART RATE: 74 BPM | HEIGHT: 67 IN | WEIGHT: 191.8 LBS | BODY MASS INDEX: 30.1 KG/M2 | RESPIRATION RATE: 18 BRPM | TEMPERATURE: 98.1 F

## 2019-04-08 DIAGNOSIS — D49.4 BLADDER TUMOR: Primary | ICD-10-CM

## 2019-04-08 DIAGNOSIS — C67.8 MALIGNANT NEOPLASM OF OVERLAPPING SITES OF BLADDER (HCC): ICD-10-CM

## 2019-04-08 LAB
CREAT SERPL-MCNC: 0.6 MG/DL (ref 0.4–1.2)
GFR SERPL CREATININE-BSD FRML MDRD: > 90 ML/MIN/1.73M2
HCT VFR BLD CALC: 42.6 % (ref 42–52)
HEMOGLOBIN: 14.7 GM/DL (ref 14–18)
MCH RBC QN AUTO: 30.3 PG (ref 27–31)
MCHC RBC AUTO-ENTMCNC: 34.5 GM/DL (ref 33–37)
MCV RBC AUTO: 88 FL (ref 80–94)
PDW BLD-RTO: 11.3 % (ref 11.5–14.5)
PLATELET # BLD: 120 THOU/MM3 (ref 130–400)
PMV BLD AUTO: 6.9 FL (ref 7.4–10.4)
RBC # BLD: 4.85 MILL/MM3 (ref 4.7–6.1)
SEG NEUTROPHILS: 73 % (ref 43–75)
SEGMENTED NEUTROPHILS ABSOLUTE COUNT: 3.8 THOU/MM3 (ref 1.8–7.7)
WBC # BLD: 5.2 THOU/MM3 (ref 4.8–10.8)

## 2019-04-08 PROCEDURE — 36415 COLL VENOUS BLD VENIPUNCTURE: CPT

## 2019-04-08 PROCEDURE — 82565 ASSAY OF CREATININE: CPT

## 2019-04-08 PROCEDURE — 6360000002 HC RX W HCPCS: Performed by: INTERNAL MEDICINE

## 2019-04-08 PROCEDURE — 96413 CHEMO IV INFUSION 1 HR: CPT

## 2019-04-08 PROCEDURE — 77338 DESIGN MLC DEVICE FOR IMRT: CPT | Performed by: RADIOLOGY

## 2019-04-08 PROCEDURE — 96366 THER/PROPH/DIAG IV INF ADDON: CPT

## 2019-04-08 PROCEDURE — 85027 COMPLETE CBC AUTOMATED: CPT

## 2019-04-08 PROCEDURE — 96367 TX/PROPH/DG ADDL SEQ IV INF: CPT

## 2019-04-08 PROCEDURE — 96415 CHEMO IV INFUSION ADDL HR: CPT

## 2019-04-08 PROCEDURE — 2580000003 HC RX 258: Performed by: INTERNAL MEDICINE

## 2019-04-08 PROCEDURE — 77336 RADIATION PHYSICS CONSULT: CPT | Performed by: RADIOLOGY

## 2019-04-08 PROCEDURE — 2709999900 HC NON-CHARGEABLE SUPPLY

## 2019-04-08 PROCEDURE — 77386 HC NTSTY MODUL RAD TX DLVR CPLX: CPT | Performed by: RADIOLOGY

## 2019-04-08 PROCEDURE — 96375 TX/PRO/DX INJ NEW DRUG ADDON: CPT

## 2019-04-08 RX ORDER — SODIUM CHLORIDE 9 MG/ML
INJECTION, SOLUTION INTRAVENOUS ONCE
Status: COMPLETED | OUTPATIENT
Start: 2019-04-08 | End: 2019-04-08

## 2019-04-08 RX ORDER — PALONOSETRON 0.05 MG/ML
0.25 INJECTION, SOLUTION INTRAVENOUS ONCE
Status: COMPLETED | OUTPATIENT
Start: 2019-04-08 | End: 2019-04-08

## 2019-04-08 RX ADMIN — POTASSIUM CHLORIDE: 2 INJECTION, SOLUTION, CONCENTRATE INTRAVENOUS at 11:55

## 2019-04-08 RX ADMIN — POTASSIUM CHLORIDE: 2 INJECTION, SOLUTION, CONCENTRATE INTRAVENOUS at 16:15

## 2019-04-08 RX ADMIN — SODIUM CHLORIDE 150 MG: 9 INJECTION, SOLUTION INTRAVENOUS at 13:05

## 2019-04-08 RX ADMIN — CISPLATIN: 1 INJECTION, SOLUTION INTRAVENOUS at 14:05

## 2019-04-08 RX ADMIN — PALONOSETRON 0.25 MG: 0.05 INJECTION, SOLUTION INTRAVENOUS at 13:40

## 2019-04-08 RX ADMIN — DEXAMETHASONE SODIUM PHOSPHATE 12 MG: 4 INJECTION, SOLUTION INTRA-ARTICULAR; INTRALESIONAL; INTRAMUSCULAR; INTRAVENOUS; SOFT TISSUE at 13:40

## 2019-04-08 RX ADMIN — SODIUM CHLORIDE: 9 INJECTION, SOLUTION INTRAVENOUS at 11:45

## 2019-04-08 NOTE — PLAN OF CARE
Problem: Intellectual/Education/Knowledge Deficit  Goal: Teaching initiated upon admission  Outcome: Met This Shift  Note:   Patient verbalizes understanding to verbal information given on cisplatin,action and possible side effects. Aware to call MD if develop complications. Intervention: Verbal/written education provided  Note:   Chemotherapy Teaching     What is Chemotherapy   Drug action ? Method of Administration ? Handouts given ? Side Effects  Nausea/vomiting ? Diarrhea ? Fatigue ? Signs / Symptoms of infection ? Neutropenia ? Thrombocytopenia ? Alopecia ? neuropathy ? [place] diet &  the importance of fluids ? Micellaneous  Importance of nutrition ? Importance of oral hygiene ? When to call the MD ?   Monitoring labs ? Use of supportive services ? Explanation of Drug Regimen / Frequency  cisplatin     Comments  Verbalized understanding to drug,action,side effects and when to call MD         Problem: Discharge Planning  Goal: Knowledge of discharge instructions  Description  Knowledge of discharge instructions     Outcome: Met This Shift  Note:   Verbalize understanding of discharge instructions, follow up appointments, and when to call Physician. Intervention: Interaction with patient/family and care team  Note:   Provide discharge instructions. Problem: Musculor/Skeletal Functional Status  Goal: Absence of falls  Outcome: Met This Shift  Note:   Free from falls while in O.P. Oncology. Intervention: Provide standby assistance  Note:   Discussed the need to use the call light for assistance when getting up to ambulate. Call light within reach. Care plan reviewed with patient. Patient verbalize understanding of the plan of care and contribute to goal setting.

## 2019-04-08 NOTE — PROGRESS NOTES
Patient assessed for the following post chemotherapy:    Dizziness   No  Lightheadedness  No     Acute nausea/vomiting No  Headache   No  Chest pain/pressure  No  Rash/itching   No  Shortness of breath  No    Patient kept for 20 minutes observation post infusion chemotherapy. Patient tolerated chemotherapy treatment cisplatin without any complications. Last vital signs:   /66   Pulse 74   Temp 98.1 °F (36.7 °C) (Oral)   Resp 18   Ht 5' 7\" (1.702 m)   Wt 191 lb 12.8 oz (87 kg)   SpO2 94%   BMI 30.04 kg/m²     Patient instructed if experience any of the above symptoms following today's infusion,he/she is to notify MD immediately or go to the emergency department. Discharge instructions given to patient. Verbalizes understanding. Ambulated off unit per self with belongings.

## 2019-04-08 NOTE — ONCOLOGY
Chemotherapy Administration    Pre-assessment Data: Antineoplastic Agents  Other:   See toxicity flow sheet for assessment [x]     Physician Notification of Concerns Related to Chemotherapy Administration:   Physician Notified Ghanshyam Vargas / Time of Notification      Interventions:   Lab work assessed  [x]   Height / Weight verified for dose [x]   Current MAR reviewed [x]   Emergency drugs available as appropriate [x]   Anaphylaxis assessment completed [x]   Pre-medications administered as ordered [x]   Blood return noted upon initiation of chemotherapy [x]   Blood return noted each 1-2ml of a vesicant medication if given IV push []   Blood return noted each 2-3ml of a non-vesicant medication if given IV push []   Monitor for signs / symptoms of hypersensitivity reaction [x]   Chemotherapy orders (drug/dose/rate) verified by 2 Chemo certified RNs [x]   Monitor IV site and blood return throughout the infusion of the medication [x]   Document IV site checks on the IV assessment form [x]   Document chemotherapy teaching on the Patient Education tab [x]   Document patient verbalizes understanding of medications being administered [x]   If IV infiltration, see ONS Guidelines []   Other: cisplatin      [x]

## 2019-04-09 ENCOUNTER — CLINICAL DOCUMENTATION (OUTPATIENT)
Dept: NUTRITION | Age: 74
End: 2019-04-09

## 2019-04-09 PROCEDURE — 77386 HC NTSTY MODUL RAD TX DLVR CPLX: CPT | Performed by: RADIOLOGY

## 2019-04-10 PROCEDURE — 77386 HC NTSTY MODUL RAD TX DLVR CPLX: CPT | Performed by: RADIOLOGY

## 2019-04-10 NOTE — PROGRESS NOTES
Mr. Hakeem Morales was seen in follow up for muscle invasive bladder cancer. This was recently found on a TURBT. He also had a large stone. Both the tumor and the stone were deep in a right sided bladder diverticulum.       Past Medical History:   Diagnosis Date    Anxiety     Cataract     LEFT EYE    Diabetes mellitus ([address]     Glaucoma     LEFT EYE    History of hematuria     Hyperlipidemia    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     SINCE CHILDHOOD    Thyroid disease     Type 2 diabetes mellitus without complication ([address]        Past Surgical History:   Procedure Laterality Date    CYSTOSCOPY N/A 1/30/2019    TRANSURETHRAL RESECTION BLADDER TUMOR, CYSTOLITHOLAPAXY performed by Andres Spaulding MD at [address] [place] [address]         Current Outpatient Medications on File Prior to Visit   Medication Sig Dispense Refill    diclofenac (VOLTAREN) 50 MG EC tablet Take 1 tablet by mouth 2 times daily 60 tablet 3    linagliptin (TRADJENTA) 5 MG tablet Take 5 mg by mouth daily      Multiple Vitamins-Minerals (CVS SPECTRAVITE ADULT 50+ PO) Take by mouth      Saw [place] 450 MG CAPS Take by mouth daily Indications: 3-4 times per week PRN       atorvastatin (LIPITOR) 40 MG tablet Take 40 mg by mouth daily      metFORMIN (GLUCOPHAGE) 500 MG tablet Take 1,000 mg by mouth 2 times daily (with meals)       levothyroxine (SYNTHROID) 125 MCG tablet Take 125 mcg by mouth Daily      prednisoLONE acetate (PRED FORTE) 1 % ophthalmic suspension 1 drop 4 times daily      timolol (BETIMOL) 0.5 % ophthalmic solution 1 drop 2 times daily      brimonidine (ALPHAGAN) 0.2 % ophthalmic solution 1 drop 3 times daily      cyclopentolate (CYCLOGYL) 1 % ophthalmic solution 1 drop once      oxybutynin (DITROPAN) 5 MG tablet Take 1 tablet by mouth 3 times daily as needed (stent pain, bladder spasms) 30 tablet 0    phenazopyridine (PYRIDIUM) 100 MG tablet Take 1 tablet by mouth 3 times daily as needed for Pain 12 tablet 0     No current facility-administered medications on file prior to visit. No Known Allergies    Family History   Problem Relation Age of Onset    Other Mother         BRAIN TUMOR    Heart Disease Father        Social History     Socioeconomic History    Marital status:      Spouse name: Not on file    Number of children: Not on file    Years of education: Not on file    Highest education level: Not on file   Occupational History    Not on file   Social Needs    Financial resource strain: Not on file    Food insecurity:     Worry: Not on file     Inability: Not on file    Transportation needs:     Medical: Not on file     Non-medical: Not on file   Tobacco Use    Smoking status: Current Every Day Smoker     Packs/day: 1.50     Years: 53.00     Pack years: 79.50     Types: Cigarettes    Smokeless tobacco: Never Used    Tobacco comment: depending on tumor analysis   Substance and Sexual Activity    Alcohol use: No     Comment: not in 40 years    Drug use: No    Sexual activity: Not on file   Lifestyle    Physical activity:     Days per week: Not on file     Minutes per session: Not on file    Stress: Not on file   Relationships    Social connections:     Talks on phone: Not on file     Gets together: Not on file     Attends Je[employer]'s witness service: Not on file     Active member of club or organization: Not on file     Attends meetings of clubs or organizations: Not on file     Relationship status: Not on file    Intimate partner violence:     Fear of current or ex partner: Not on file     Emotionally abused: Not on file     Physically abused: Not on file     Forced sexual activity: Not on file   Other Topics Concern    Not on file   Social History Narrative    Not on file       Review of Systems  No problems with ears, nose or throat. No problems with eyes.   No chest pain, shortness of breath, abdominal pain, extremity pain or weakness, and no neurological deficits. No rashes. No swollen glands or lymph nodes.  symptoms per HPI. The remainder of the review of symptoms is negative. Exam  Constitutional: oriented to person, place, and time. Vital signs are normal. appears well-developed and well-nourished. cooperative. No distress. HENT:    Head: Normocephalic and atraumatic.    Mouth/Throat: Oropharynx is clear and moist and mucous membranes are normal. No oropharyngeal exudate. Eyes: EOM are normal. Pupils are equal, round, and reactive to light. Right eye exhibits no discharge. Left eye exhibits no discharge. No scleral icterus. Neck: Trachea normal. No JVD present. No tracheal deviation present. Cardiovascular: Normal rate and regular rhythm. Pulmonary/Chest: Effort normal. No respiratory distress. no wheezes. Abdominal: Soft. exhibits no distension. There is no tenderness. There is no rebound and no CVA tenderness. Musculoskeletal: no edema or tenderness. Lymphadenopathy:   Right: No supraclavicular adenopathy present. Left: No supraclavicular adenopathy present. Neurological: alert and oriented to person, place, and time. No cranial nerve deficit. Skin: Skin is warm and dry. not diaphoretic. Psychiatric: normal mood and affect. behavior is normal.   Nursing note and vitals reviewed. Labs    No results found for this visit on 02/25/19. Lab Results   Component Value Date    CREATININE 0.6 04/08/2019    BUN 9 01/31/2019     04/01/2019    K 4.4 04/01/2019     01/31/2019    CO2 22 (L) 01/31/2019     Surgical pathology:      Plan:  Jodie Jeans presents for further discussion of his bladder cancer. 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 This is a very aggressive cancer and a very rare cancer and it is unclear what options there would be for chemotherapy and also if the cancer would be radiosensitive.     We had a long discussion today about his cancer and about the nature of his disease. Today's entire visit was spent in discussion and counseling and the total visit time was 45 minutes. He will be seeing Dr. Malia Zavala. I will discuss the case with Dr. Cynthia Andrade and see if we can send the tumor for NGS to find out if there is a molecular defect we could target. He does not want surgery, although I believe this would be his best option. We will see him back in a month. We will order an MRI of his pelvis to see the extent of local disease.

## 2019-04-11 PROCEDURE — 77386 HC NTSTY MODUL RAD TX DLVR CPLX: CPT | Performed by: RADIOLOGY

## 2019-04-12 DIAGNOSIS — D49.4 BLADDER TUMOR: Primary | ICD-10-CM

## 2019-04-12 PROCEDURE — 77386 HC NTSTY MODUL RAD TX DLVR CPLX: CPT | Performed by: RADIOLOGY

## 2019-04-14 RX ORDER — HEPARIN SODIUM (PORCINE) LOCK FLUSH IV SOLN 100 UNIT/ML 100 UNIT/ML
500 SOLUTION INTRAVENOUS PRN
Status: CANCELLED | OUTPATIENT
Start: 2019-04-29

## 2019-04-14 RX ORDER — METHYLPREDNISOLONE SODIUM SUCCINATE 125 MG/2ML
125 INJECTION, POWDER, LYOPHILIZED, FOR SOLUTION INTRAMUSCULAR; INTRAVENOUS ONCE
Status: CANCELLED | OUTPATIENT
Start: 2019-04-29

## 2019-04-14 RX ORDER — 0.9 % SODIUM CHLORIDE 0.9 %
10 VIAL (ML) INJECTION ONCE
Status: CANCELLED | OUTPATIENT
Start: 2019-04-29

## 2019-04-14 RX ORDER — DIPHENHYDRAMINE HYDROCHLORIDE 50 MG/ML
50 INJECTION INTRAMUSCULAR; INTRAVENOUS ONCE
Status: CANCELLED | OUTPATIENT
Start: 2019-04-29

## 2019-04-14 RX ORDER — SODIUM CHLORIDE 0.9 % (FLUSH) 0.9 %
10 SYRINGE (ML) INJECTION PRN
Status: CANCELLED | OUTPATIENT
Start: 2019-04-29

## 2019-04-14 RX ORDER — PALONOSETRON 0.05 MG/ML
0.25 INJECTION, SOLUTION INTRAVENOUS ONCE
Status: CANCELLED | OUTPATIENT
Start: 2019-04-29

## 2019-04-14 RX ORDER — SODIUM CHLORIDE 9 MG/ML
INJECTION, SOLUTION INTRAVENOUS CONTINUOUS
Status: CANCELLED | OUTPATIENT
Start: 2019-04-29

## 2019-04-14 RX ORDER — SODIUM CHLORIDE 9 MG/ML
INJECTION, SOLUTION INTRAVENOUS ONCE
Status: CANCELLED | OUTPATIENT
Start: 2019-04-29

## 2019-04-14 RX ORDER — SODIUM CHLORIDE 0.9 % (FLUSH) 0.9 %
5 SYRINGE (ML) INJECTION PRN
Status: CANCELLED | OUTPATIENT
Start: 2019-04-29

## 2019-04-14 ASSESSMENT — ENCOUNTER SYMPTOMS
SORE THROAT: 0
RECTAL PAIN: 0
TROUBLE SWALLOWING: 0
COUGH: 0
CONSTIPATION: 0
BACK PAIN: 0
CHEST TIGHTNESS: 0
ABDOMINAL PAIN: 0
SHORTNESS OF BREATH: 0
FACIAL SWELLING: 0
DIARRHEA: 0
NAUSEA: 0
WHEEZING: 0
EYE DISCHARGE: 0
BLOOD IN STOOL: 0
VOMITING: 0
ABDOMINAL DISTENTION: 0
COLOR CHANGE: 0

## 2019-04-15 ENCOUNTER — OFFICE VISIT (OUTPATIENT)
Dept: ONCOLOGY | Age: 74
End: 2019-04-15
Payer: MEDICARE

## 2019-04-15 ENCOUNTER — HOSPITAL ENCOUNTER (OUTPATIENT)
Dept: INFUSION THERAPY | Age: 74
Discharge: HOME | End: 2019-04-15
Payer: MEDICARE

## 2019-04-15 VITALS
HEIGHT: 67 IN | RESPIRATION RATE: 18 BRPM | BODY MASS INDEX: 30.17 KG/M2 | DIASTOLIC BLOOD PRESSURE: 65 MMHG | OXYGEN SATURATION: 98 % | HEART RATE: 82 BPM | TEMPERATURE: 96.9 F | SYSTOLIC BLOOD PRESSURE: 131 MMHG | WEIGHT: 192.2 LBS

## 2019-04-15 DIAGNOSIS — C67.9 UROTHELIAL CARCINOMA OF BLADDER (HCC): ICD-10-CM

## 2019-04-15 DIAGNOSIS — D69.59 CHEMOTHERAPY-INDUCED THROMBOCYTOPENIA: ICD-10-CM

## 2019-04-15 DIAGNOSIS — T45.1X5A CHEMOTHERAPY-INDUCED THROMBOCYTOPENIA: ICD-10-CM

## 2019-04-15 DIAGNOSIS — Z51.11 ENCOUNTER FOR CHEMOTHERAPY MANAGEMENT: ICD-10-CM

## 2019-04-15 DIAGNOSIS — D49.4 BLADDER TUMOR: Primary | ICD-10-CM

## 2019-04-15 DIAGNOSIS — D49.4 BLADDER TUMOR: ICD-10-CM

## 2019-04-15 LAB
ALBUMIN SERPL-MCNC: 3.5 G/DL (ref 3.5–5.1)
ALP BLD-CCNC: 142 U/L (ref 38–126)
ALT SERPL-CCNC: 16 U/L (ref 11–66)
AST SERPL-CCNC: 15 U/L (ref 5–40)
BILIRUB SERPL-MCNC: 0.5 MG/DL (ref 0.3–1.2)
BILIRUBIN DIRECT: < 0.2 MG/DL (ref 0–0.3)
BUN, WHOLE BLOOD: 19 MG/DL (ref 8–26)
CHLORIDE, WHOLE BLOOD: 97 MEQ/L (ref 98–109)
CREATININE, WHOLE BLOOD: 0.8 MG/DL (ref 0.5–1.2)
GFR, ESTIMATED: > 90 ML/MIN/1.73M2
GLUCOSE, WHOLE BLOOD: 185 MG/DL (ref 70–108)
HCT VFR BLD CALC: 42.1 % (ref 42–52)
HEMOGLOBIN: 14.6 GM/DL (ref 14–18)
IONIZED CALCIUM, WHOLE BLOOD: 1.24 MMOL/L (ref 1.12–1.32)
MCH RBC QN AUTO: 30.4 PG (ref 27–31)
MCHC RBC AUTO-ENTMCNC: 34.6 GM/DL (ref 33–37)
MCV RBC AUTO: 88 FL (ref 80–94)
PDW BLD-RTO: 11.4 % (ref 11.5–14.5)
PLATELET # BLD: 72 THOU/MM3 (ref 130–400)
PMV BLD AUTO: 7.2 FL (ref 7.4–10.4)
POTASSIUM, WHOLE BLOOD: 5.2 MEQ/L (ref 3.5–4.9)
RBC # BLD: 4.79 MILL/MM3 (ref 4.7–6.1)
SEG NEUTROPHILS: 76 % (ref 43–75)
SEGMENTED NEUTROPHILS ABSOLUTE COUNT: 3.4 THOU/MM3 (ref 1.8–7.7)
SODIUM, WHOLE BLOOD: 136 MEQ/L (ref 138–146)
TOTAL CO2, WHOLE BLOOD: 31 MEQ/L (ref 23–33)
TOTAL PROTEIN: 6.2 G/DL (ref 6.1–8)
WBC # BLD: 4.5 THOU/MM3 (ref 4.8–10.8)

## 2019-04-15 PROCEDURE — G8427 DOCREV CUR MEDS BY ELIG CLIN: HCPCS | Performed by: INTERNAL MEDICINE

## 2019-04-15 PROCEDURE — 77336 RADIATION PHYSICS CONSULT: CPT | Performed by: RADIOLOGY

## 2019-04-15 PROCEDURE — 4040F PNEUMOC VAC/ADMIN/RCVD: CPT | Performed by: INTERNAL MEDICINE

## 2019-04-15 PROCEDURE — 36415 COLL VENOUS BLD VENIPUNCTURE: CPT

## 2019-04-15 PROCEDURE — 77386 HC NTSTY MODUL RAD TX DLVR CPLX: CPT | Performed by: RADIOLOGY

## 2019-04-15 PROCEDURE — 80076 HEPATIC FUNCTION PANEL: CPT

## 2019-04-15 PROCEDURE — 80047 BASIC METABLC PNL IONIZED CA: CPT

## 2019-04-15 PROCEDURE — 3017F COLORECTAL CA SCREEN DOC REV: CPT | Performed by: INTERNAL MEDICINE

## 2019-04-15 PROCEDURE — 4004F PT TOBACCO SCREEN RCVD TLK: CPT | Performed by: INTERNAL MEDICINE

## 2019-04-15 PROCEDURE — 1123F ACP DISCUSS/DSCN MKR DOCD: CPT | Performed by: INTERNAL MEDICINE

## 2019-04-15 PROCEDURE — 99214 OFFICE O/P EST MOD 30 MIN: CPT | Performed by: INTERNAL MEDICINE

## 2019-04-15 PROCEDURE — G8417 CALC BMI ABV UP PARAM F/U: HCPCS | Performed by: INTERNAL MEDICINE

## 2019-04-15 PROCEDURE — 85027 COMPLETE CBC AUTOMATED: CPT

## 2019-04-15 PROCEDURE — 99211 OFF/OP EST MAY X REQ PHY/QHP: CPT

## 2019-04-15 NOTE — PROGRESS NOTES
[facility] [address]  [address] [address]  Dept: [phone]  Dept Fax: [phone]: [phone]    Visit Date:4/15/2019     Mireya Lal is a 68 y.o. male who presents today for:   Chief Complaint   Patient presents with    Follow-up     Bladder Cancer        HPI:   This is a 66-year-old man with muscle invasive bladder cancer. He presents to the medical oncology clinic to discuss neoadjuvant chemotherapy. Patient developed gross hematuria and flank pain end of December 2018. He had CT urogram on December 13, 2018 that showed irregular thickening of the right posterior lateral bladder wall. Findings were highly suspicious for the presence of malignancy. On January 28, 2019 the patient underwent cystoscopy by Dr. Walter Jackson. It showed bladder trabeculations and bladder stone, on the right side there was a diverticulum with a bladder stone and bladder tumor inside. On January 30, 2019 the patient underwent TURBT. Final pathology report showed invasive high-grade urothelial carcinoma, clear cell variant. Deep smooth muscle was involved by carcinoma. Dr. Walter Jackson recommended neoadjuvant chemotherapy before bladder resection. However, the patient is absolutely opposed to having a bladder surgery. He agreed to concurrent chemoradiation with understanding that outcome is inferior to neoadjuvant chemo and surgery. Interval history on 04/15/2019:  The patient presents to the medical oncology clinic for 5th infusion of weekly cisplatin. He tolerated last infusion well. He feels a little bit more tired, but he denies having any nausea, no vomiting. He denies having any peripheral neuropathy, no hearing problems. He denies hematuria.   Denies having any fevers, no flank pain  HPI   Past Medical History:   Diagnosis Date    Anxiety     Cataract     LEFT EYE    [address] mellitus [address] [address]     Glaucoma     LEFT EYE    History of hematuria     Hyperlipidemia 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     SINCE CHILDHOOD    Thyroid disease     Type 2 diabetes mellitus without complication ([facility])       Past Surgical History:   Procedure Laterality Date    CYSTOSCOPY N/A 1/30/2019    TRANSURETHRAL RESECTION BLADDER TUMOR, CYSTOLITHOLAPAXY performed by Patricia Sumner MD at [address] [place] [address]        Family History   Problem Relation Age of Onset    Other Mother         BRAIN TUMOR    Heart Disease Father       Social History     Tobacco Use    Smoking status: Current Every Day Smoker     Packs/day: 1.50     Years: 53.00     Pack years: 79.50     Types: Cigarettes    Smokeless tobacco: Never Used    Tobacco comment: depending on tumor analysis   Substance Use Topics    Alcohol use: No     Comment: not in 40 years      Current Outpatient Medications   Medication Sig Dispense Refill    oxybutynin (DITROPAN) 5 MG tablet Take 1 tablet by mouth 3 times daily as needed (stent pain, bladder spasms) 30 tablet 0    diclofenac (VOLTAREN) 50 MG EC tablet Take 1 tablet by mouth 2 times daily 60 tablet 3    phenazopyridine (PYRIDIUM) 100 MG tablet Take 1 tablet by mouth 3 times daily as needed for Pain 12 tablet 0    linagliptin (TRADJENTA) 5 MG tablet Take 5 mg by mouth daily      Multiple Vitamins-Minerals (CVS SPECTRAVITE ADULT 50+ PO) Take by mouth      Saw [place] 450 MG CAPS Take by mouth daily Indications: 3-4 times per week PRN       atorvastatin (LIPITOR) 40 MG tablet Take 40 mg by mouth daily      metFORMIN (GLUCOPHAGE) 500 MG tablet Take 1,000 mg by mouth 2 times daily (with meals)       levothyroxine (SYNTHROID) 125 MCG tablet Take 125 mcg by mouth Daily      prednisoLONE acetate (PRED FORTE) 1 % ophthalmic suspension 1 drop 4 times daily      timolol (BETIMOL) 0.5 % ophthalmic solution 1 drop 2 times daily      brimonidine (ALPHAGAN) 0.2 % ophthalmic solution 1 drop 3 times daily      cyclopentolate (CYCLOGYL) 1 % ophthalmic solution 1 drop once       No current facility-administered medications for this visit. No Known Allergies   Health Maintenance   Topic Date Due    Hepatitis C screen  1945    DTaP/Tdap/Td vaccine (1 - Tdap) 12/16/1964    Lipid screen  12/16/1985    Shingles Vaccine (1 of 2) 12/16/1995    Colon cancer screen colonoscopy  12/16/1995    Pneumococcal 65+ years Vaccine (1 of 2 - PCV13) 12/16/2010    Flu vaccine (Season Ended) 09/01/2019    Low dose CT lung screening  01/04/2020    AAA screen  Completed        Subjective:   Review of Systems   Constitutional: Negative for activity change, appetite change, fatigue and fever. HENT: Negative for congestion, dental problem, facial swelling, hearing loss, mouth sores, nosebleeds, sore throat, tinnitus and trouble swallowing. Eyes: Negative for discharge and visual disturbance. Respiratory: Negative for cough, chest tightness, shortness of breath and wheezing. Cardiovascular: Negative for chest pain, palpitations and leg swelling. Gastrointestinal: Negative for abdominal distention, abdominal pain, blood in stool, constipation, diarrhea, nausea, rectal pain and vomiting. Endocrine: Negative for cold intolerance, polydipsia and polyuria. Genitourinary: Negative for decreased urine volume, difficulty urinating, dysuria, flank pain, hematuria and urgency. Musculoskeletal: Negative for arthralgias, back pain, gait problem, joint swelling, myalgias and neck stiffness. Skin: Negative for color change, rash and wound. Neurological: Negative for dizziness, tremors, seizures, speech difficulty, weakness, light-headedness, numbness and headaches. Hematological: Negative for adenopathy. Does not bruise/bleed easily. Psychiatric/Behavioral: Negative for confusion and sleep disturbance. The patient is not nervous/anxious.          Objective:   Physical Exam   Constitutional: He is oriented to person, place, and time. He appears well-developed and well-nourished. No distress. HENT:   Head: Normocephalic. Mouth/Throat: Oropharynx is clear and moist. No oropharyngeal exudate. Eyes: Pupils are equal, round, and reactive to light. EOM are normal. Right eye exhibits no discharge. Left eye exhibits no discharge. No scleral icterus. Neck: Normal range of motion. Neck supple. No JVD present. No tracheal deviation present. No thyromegaly present. Cardiovascular: Normal rate and normal heart sounds. Exam reveals no gallop and no friction rub. No murmur heard. Pulmonary/Chest: Effort normal and breath sounds normal. No stridor. No respiratory distress. He has no wheezes. He has no rales. He exhibits no tenderness. Abdominal: Soft. Bowel sounds are normal. He exhibits no distension and no mass. There is no tenderness. There is no rebound. Musculoskeletal: Normal range of motion. He exhibits no edema. Good range of motion in all four extremities. Lymphadenopathy:     He has no cervical adenopathy. Neurological: He is alert and oriented to person, place, and time. He has normal reflexes. No cranial nerve deficit. He exhibits normal muscle tone. Skin: Skin is warm. No rash noted. No erythema. Psychiatric: He has a normal mood and affect. His behavior is normal. Judgment and thought content normal.   Vitals reviewed. /65 (Site: Left Upper Arm, Position: Sitting, Cuff Size: Large Adult)   Pulse 82   Temp 96.9 °F (36.1 °C) (Tympanic)   Resp 18   Ht 5' 7\" (1.702 m)   Wt 192 lb 3.2 oz (87.2 kg)   SpO2 98%   BMI 30.10 kg/m²      ECOG status is 0. Imaging studies and labs:     CT of the abdomen on December 13, 2018 showed: There is a right posterior lateral bladder diverticulum which contains both enhancing soft tissue as well as a large calculus.  There is a 17 mm bladder calculus in addition there is irregular thickening of the right posterior lateral bladder    wall and these findings high-grade urothelial carcinoma of the bladder,clear cell variant.   I discuss with the patient the benefit of neoadjuvant chemotherapy, the schedule and possible side effects of treatment. Despite radical cystectomy, approximately one-half of patients with muscle-invasive urothelial bladder cancer involving the muscularis propria will develop metastatic disease within two years. Therefore the preferred management of patients with muscle-invasive bladder cancer consists of a multimodal approach comprising neoadjuvant chemotherapy followed by radical cystectomy. This approach is supported by  evidence that neoadjuvant cisplatin-based chemotherapy improves survival compared with locoregional treatment alone. A 2003 meta-analysis of 11 randomized trials that compared cisplatin-based neoadjuvant chemotherapy plus local therapy versus local therapy alone showed an improvement in overall survival (5-year OS 50 versus 45%) and a lower risk of recurrence (HR for recurrence 0.81). Choice of chemotherapy regimens include MVAC for healthy patients younger than 79years of age. In older patients and those unable to tolerate this combination due to medical comorbidities, gemcitabine plus cisplatin (GC) is a reasonable alternative. The patient is quite functional, without contraindications for cisplatin, therefore he should be able to tolerate the gemcitabine and cisplatin. He had MRI of the pelvis that showed a diverticulum extending from the posterior margin of the right lateral wall the urinary bladder with an associated enhancing mass lesion which measures 3.6 x 3.1 x 3.9 cm in size. The patient refused to have surgery. During original visit we discussed the alternative options. I explained to him that the only potentially curative option is surgery. For patients who are  medically inoperable due to comorbidity and frailty, chemoradiation may be a reasonable alternative.  Some elderly patients with invasive bladder

## 2019-04-15 NOTE — PATIENT INSTRUCTIONS
1.  No treatment today due to thrombocytopenia  2. RT chemo suite in one week for labs: CBC, BMP, LFTs and his next cycle of chemo  3.   RTC to see me in 2 weeks

## 2019-04-16 PROCEDURE — 77386 HC NTSTY MODUL RAD TX DLVR CPLX: CPT | Performed by: RADIOLOGY

## 2019-04-17 ENCOUNTER — CLINICAL DOCUMENTATION (OUTPATIENT)
Dept: NUTRITION | Age: 74
End: 2019-04-17

## 2019-04-17 PROCEDURE — 77386 HC NTSTY MODUL RAD TX DLVR CPLX: CPT | Performed by: RADIOLOGY

## 2019-04-18 DIAGNOSIS — D49.4 BLADDER TUMOR: Primary | ICD-10-CM

## 2019-04-18 PROCEDURE — 77386 HC NTSTY MODUL RAD TX DLVR CPLX: CPT | Performed by: RADIOLOGY

## 2019-04-19 PROCEDURE — 77386 HC NTSTY MODUL RAD TX DLVR CPLX: CPT | Performed by: RADIOLOGY

## 2019-04-22 ENCOUNTER — HOSPITAL ENCOUNTER (OUTPATIENT)
Dept: INFUSION THERAPY | Age: 74
Discharge: HOME | End: 2019-04-22
Payer: MEDICARE

## 2019-04-22 VITALS
DIASTOLIC BLOOD PRESSURE: 58 MMHG | TEMPERATURE: 97.7 F | OXYGEN SATURATION: 96 % | BODY MASS INDEX: 30.17 KG/M2 | HEART RATE: 94 BPM | HEIGHT: 67 IN | RESPIRATION RATE: 18 BRPM | SYSTOLIC BLOOD PRESSURE: 126 MMHG | WEIGHT: 192.2 LBS

## 2019-04-22 DIAGNOSIS — D49.4 BLADDER TUMOR: ICD-10-CM

## 2019-04-22 LAB
ALBUMIN SERPL-MCNC: 3.7 G/DL (ref 3.5–5.1)
ALP BLD-CCNC: 140 U/L (ref 38–126)
ALT SERPL-CCNC: 15 U/L (ref 11–66)
AST SERPL-CCNC: 13 U/L (ref 5–40)
BILIRUB SERPL-MCNC: 0.5 MG/DL (ref 0.3–1.2)
BILIRUBIN DIRECT: < 0.2 MG/DL (ref 0–0.3)
BUN, WHOLE BLOOD: 14 MG/DL (ref 8–26)
CHLORIDE, WHOLE BLOOD: 98 MEQ/L (ref 98–109)
CREATININE, WHOLE BLOOD: 0.8 MG/DL (ref 0.5–1.2)
GFR, ESTIMATED: > 90 ML/MIN/1.73M2
GLUCOSE, WHOLE BLOOD: 196 MG/DL (ref 70–108)
HCT VFR BLD CALC: 40 % (ref 42–52)
HEMOGLOBIN: 13.7 GM/DL (ref 14–18)
IONIZED CALCIUM, WHOLE BLOOD: 1.16 MMOL/L (ref 1.12–1.32)
MCH RBC QN AUTO: 30.3 PG (ref 27–31)
MCHC RBC AUTO-ENTMCNC: 34.3 GM/DL (ref 33–37)
MCV RBC AUTO: 88 FL (ref 80–94)
PDW BLD-RTO: 11.8 % (ref 11.5–14.5)
PLATELET # BLD: 87 THOU/MM3 (ref 130–400)
PMV BLD AUTO: 6.5 FL (ref 7.4–10.4)
POTASSIUM, WHOLE BLOOD: 4.6 MEQ/L (ref 3.5–4.9)
RBC # BLD: 4.52 MILL/MM3 (ref 4.7–6.1)
SEG NEUTROPHILS: 64 % (ref 43–75)
SEGMENTED NEUTROPHILS ABSOLUTE COUNT: 1.3 THOU/MM3 (ref 1.8–7.7)
SODIUM, WHOLE BLOOD: 138 MEQ/L (ref 138–146)
TOTAL CO2, WHOLE BLOOD: 30 MEQ/L (ref 23–33)
TOTAL PROTEIN: 6.4 G/DL (ref 6.1–8)
WBC # BLD: 2 THOU/MM3 (ref 4.8–10.8)

## 2019-04-22 PROCEDURE — 80076 HEPATIC FUNCTION PANEL: CPT

## 2019-04-22 PROCEDURE — 77336 RADIATION PHYSICS CONSULT: CPT | Performed by: RADIOLOGY

## 2019-04-22 PROCEDURE — 99211 OFF/OP EST MAY X REQ PHY/QHP: CPT

## 2019-04-22 PROCEDURE — 36415 COLL VENOUS BLD VENIPUNCTURE: CPT

## 2019-04-22 PROCEDURE — 85027 COMPLETE CBC AUTOMATED: CPT

## 2019-04-22 PROCEDURE — 80047 BASIC METABLC PNL IONIZED CA: CPT

## 2019-04-22 NOTE — PLAN OF CARE
Problem: Musculor/Skeletal Functional Status  Goal: Absence of falls  Outcome: Met This Shift  Note:   Free from falls while in O.P. Oncology. Intervention: Provide standby assistance  Note:   Discussed the need to use the call light for assistance when getting up to ambulate. Problem: Discharge Planning  Goal: Knowledge of discharge instructions  Description  Knowledge of discharge instructions     Outcome: Met This Shift  Note:   Verbalized understanding of discharge instructions, follow-up appointments, and when to call the physician. Intervention: Interaction with patient/family and care team  Note:   Discuss understanding of discharge instructions,follow-up appointments, and when to call the physician. Care plan reviewed with patient. Patient verbalize understanding of the plan of care and contribute to goal setting.

## 2019-04-22 NOTE — PROGRESS NOTES
Patient assessed for the following post lab draw :    Dizziness   No  Lightheadedness  No      Acute nausea/vomiting No  Headache   No  Chest pain/pressure  No  Rash/itching   No  Shortness of breath  No    Patient tolerated lab draw without any complications. Last vital signs:   BP (!) 126/58   Pulse 94   Temp 97.7 °F (36.5 °C) (Oral)   Resp 18   Ht 5' 7\" (1.702 m)   Wt 192 lb 3.2 oz (87.2 kg)   SpO2 96%   BMI 30.10 kg/m²     Patient instructed if experience any of the above symptoms following today's infusion, he is to notify MD immediately or go to the emergency department. Discharge instructions given to patient. Verbalizes understanding. Ambulated off unit per self/with cane with belongings.

## 2019-04-24 DIAGNOSIS — C67.9 UROTHELIAL CARCINOMA OF BLADDER (HCC): Primary | ICD-10-CM

## 2019-04-25 ENCOUNTER — HOSPITAL ENCOUNTER (OUTPATIENT)
Dept: INFUSION THERAPY | Age: 74
Discharge: HOME | End: 2019-04-25
Payer: MEDICARE

## 2019-04-25 VITALS
HEIGHT: 67 IN | OXYGEN SATURATION: 98 % | TEMPERATURE: 98.1 F | RESPIRATION RATE: 18 BRPM | HEART RATE: 77 BPM | SYSTOLIC BLOOD PRESSURE: 130 MMHG | WEIGHT: 191.6 LBS | DIASTOLIC BLOOD PRESSURE: 63 MMHG | BODY MASS INDEX: 30.07 KG/M2

## 2019-04-25 DIAGNOSIS — C67.8 MALIGNANT NEOPLASM OF OVERLAPPING SITES OF BLADDER (HCC): ICD-10-CM

## 2019-04-25 DIAGNOSIS — C67.9 UROTHELIAL CARCINOMA OF BLADDER (HCC): ICD-10-CM

## 2019-04-25 LAB
ALBUMIN SERPL-MCNC: 3.2 G/DL (ref 3.5–5.1)
ALP BLD-CCNC: 136 U/L (ref 38–126)
ALT SERPL-CCNC: 12 U/L (ref 11–66)
AST SERPL-CCNC: 13 U/L (ref 5–40)
BILIRUB SERPL-MCNC: 0.6 MG/DL (ref 0.3–1.2)
BILIRUBIN DIRECT: < 0.2 MG/DL (ref 0–0.3)
BUN, WHOLE BLOOD: 15 MG/DL (ref 8–26)
CHLORIDE, WHOLE BLOOD: 101 MEQ/L (ref 98–109)
CREATININE, WHOLE BLOOD: 0.6 MG/DL (ref 0.5–1.2)
GFR, ESTIMATED: > 90 ML/MIN/1.73M2
GLUCOSE, WHOLE BLOOD: 159 MG/DL (ref 70–108)
HCT VFR BLD CALC: 38 % (ref 42–52)
HEMOGLOBIN: 13.1 GM/DL (ref 14–18)
IONIZED CALCIUM, WHOLE BLOOD: 1.19 MMOL/L (ref 1.12–1.32)
MCH RBC QN AUTO: 30.4 PG (ref 27–31)
MCHC RBC AUTO-ENTMCNC: 34.5 GM/DL (ref 33–37)
MCV RBC AUTO: 88 FL (ref 80–94)
PDW BLD-RTO: 11.6 % (ref 11.5–14.5)
PLATELET # BLD: 122 THOU/MM3 (ref 130–400)
PMV BLD AUTO: 6.5 FL (ref 7.4–10.4)
POTASSIUM, WHOLE BLOOD: 4.2 MEQ/L (ref 3.5–4.9)
RBC # BLD: 4.3 MILL/MM3 (ref 4.7–6.1)
SEG NEUTROPHILS: 54 % (ref 43–75)
SEGMENTED NEUTROPHILS ABSOLUTE COUNT: 0.9 THOU/MM3 (ref 1.8–7.7)
SODIUM, WHOLE BLOOD: 138 MEQ/L (ref 138–146)
TOTAL CO2, WHOLE BLOOD: 26 MEQ/L (ref 23–33)
TOTAL PROTEIN: 6.2 G/DL (ref 6.1–8)
WBC # BLD: 1.6 THOU/MM3 (ref 4.8–10.8)

## 2019-04-25 PROCEDURE — 80047 BASIC METABLC PNL IONIZED CA: CPT

## 2019-04-25 PROCEDURE — 80076 HEPATIC FUNCTION PANEL: CPT

## 2019-04-25 PROCEDURE — 85027 COMPLETE CBC AUTOMATED: CPT

## 2019-04-25 PROCEDURE — 36415 COLL VENOUS BLD VENIPUNCTURE: CPT

## 2019-04-25 PROCEDURE — 99211 OFF/OP EST MAY X REQ PHY/QHP: CPT

## 2019-04-25 NOTE — PROGRESS NOTES
CBC and bone marrow suppression discussed with patient. Infection risk discussed. Patient verbalized understanding of above. Discharged in satisfactory condition. Ambulated off unit per self.

## 2019-04-26 DIAGNOSIS — C67.9 UROTHELIAL CARCINOMA OF BLADDER (HCC): Primary | ICD-10-CM

## 2019-04-29 ENCOUNTER — HOSPITAL ENCOUNTER (OUTPATIENT)
Dept: INFUSION THERAPY | Age: 74
Discharge: HOME | End: 2019-04-29
Payer: MEDICARE

## 2019-04-29 ENCOUNTER — TELEPHONE (OUTPATIENT)
Dept: ONCOLOGY | Age: 74
End: 2019-04-29

## 2019-04-29 ENCOUNTER — OFFICE VISIT (OUTPATIENT)
Dept: ONCOLOGY | Age: 74
End: 2019-04-29
Payer: MEDICARE

## 2019-04-29 VITALS
HEART RATE: 89 BPM | OXYGEN SATURATION: 95 % | WEIGHT: 191.8 LBS | HEIGHT: 67 IN | SYSTOLIC BLOOD PRESSURE: 121 MMHG | BODY MASS INDEX: 30.1 KG/M2 | RESPIRATION RATE: 18 BRPM | TEMPERATURE: 97.5 F | DIASTOLIC BLOOD PRESSURE: 60 MMHG

## 2019-04-29 VITALS
BODY MASS INDEX: 30.1 KG/M2 | HEART RATE: 89 BPM | SYSTOLIC BLOOD PRESSURE: 125 MMHG | RESPIRATION RATE: 18 BRPM | TEMPERATURE: 97.5 F | OXYGEN SATURATION: 95 % | HEIGHT: 67 IN | WEIGHT: 191.8 LBS | DIASTOLIC BLOOD PRESSURE: 76 MMHG

## 2019-04-29 DIAGNOSIS — T45.1X5A CHEMOTHERAPY INDUCED NEUTROPENIA (HCC): ICD-10-CM

## 2019-04-29 DIAGNOSIS — R31.0 GROSS HEMATURIA: ICD-10-CM

## 2019-04-29 DIAGNOSIS — C67.8 MALIGNANT NEOPLASM OF OVERLAPPING SITES OF BLADDER (HCC): ICD-10-CM

## 2019-04-29 DIAGNOSIS — Z51.11 ENCOUNTER FOR CHEMOTHERAPY MANAGEMENT: ICD-10-CM

## 2019-04-29 DIAGNOSIS — T45.1X5A CHEMOTHERAPY-INDUCED THROMBOCYTOPENIA: ICD-10-CM

## 2019-04-29 DIAGNOSIS — D69.59 CHEMOTHERAPY-INDUCED THROMBOCYTOPENIA: ICD-10-CM

## 2019-04-29 DIAGNOSIS — C67.9 UROTHELIAL CARCINOMA OF BLADDER (HCC): Primary | ICD-10-CM

## 2019-04-29 DIAGNOSIS — D70.1 CHEMOTHERAPY INDUCED NEUTROPENIA (HCC): ICD-10-CM

## 2019-04-29 LAB
ALBUMIN SERPL-MCNC: 3.2 G/DL (ref 3.5–5.1)
ALP BLD-CCNC: 130 U/L (ref 38–126)
ALT SERPL-CCNC: 10 U/L (ref 11–66)
AST SERPL-CCNC: 12 U/L (ref 5–40)
BACTERIA: ABNORMAL /HPF
BILIRUB SERPL-MCNC: 0.4 MG/DL (ref 0.3–1.2)
BILIRUBIN DIRECT: < 0.2 MG/DL (ref 0–0.3)
BILIRUBIN URINE: NEGATIVE
BLOOD, URINE: NEGATIVE
BUN, WHOLE BLOOD: 13 MG/DL (ref 8–26)
CASTS 2: ABNORMAL /LPF
CASTS UA: ABNORMAL /LPF
CHARACTER, URINE: CLEAR
CHLORIDE, WHOLE BLOOD: 100 MEQ/L (ref 98–109)
COLOR: YELLOW
CREATININE, WHOLE BLOOD: 0.7 MG/DL (ref 0.5–1.2)
CRYSTALS, UA: ABNORMAL
EPITHELIAL CELLS, UA: ABNORMAL /HPF
GFR, ESTIMATED: > 90 ML/MIN/1.73M2
GLUCOSE URINE: NEGATIVE MG/DL
GLUCOSE, WHOLE BLOOD: 124 MG/DL (ref 70–108)
HCT VFR BLD CALC: 35.4 % (ref 42–52)
HEMOGLOBIN: 12.4 GM/DL (ref 14–18)
IONIZED CALCIUM, WHOLE BLOOD: 1.22 MMOL/L (ref 1.12–1.32)
KETONES, URINE: NEGATIVE
LEUKOCYTE ESTERASE, URINE: ABNORMAL
MCH RBC QN AUTO: 30.8 PG (ref 27–31)
MCHC RBC AUTO-ENTMCNC: 35.1 GM/DL (ref 33–37)
MCV RBC AUTO: 88 FL (ref 80–94)
MISCELLANEOUS 2: ABNORMAL
NITRITE, URINE: NEGATIVE
PDW BLD-RTO: 11.6 % (ref 11.5–14.5)
PH UA: 6.5 (ref 5–9)
PLATELET # BLD: 191 THOU/MM3 (ref 130–400)
PMV BLD AUTO: 6.7 FL (ref 7.4–10.4)
POTASSIUM, WHOLE BLOOD: 4.5 MEQ/L (ref 3.5–4.9)
PROTEIN UA: 30
RBC # BLD: 4.03 MILL/MM3 (ref 4.7–6.1)
RBC URINE: ABNORMAL /HPF
RENAL EPITHELIAL, UA: ABNORMAL
SEG NEUTROPHILS: 42 % (ref 43–75)
SEGMENTED NEUTROPHILS ABSOLUTE COUNT: 0.8 THOU/MM3 (ref 1.8–7.7)
SODIUM, WHOLE BLOOD: 140 MEQ/L (ref 138–146)
SPECIFIC GRAVITY, URINE: 1.02 (ref 1–1.03)
TOTAL CO2, WHOLE BLOOD: 30 MEQ/L (ref 23–33)
TOTAL PROTEIN: 6.2 G/DL (ref 6.1–8)
UROBILINOGEN, URINE: 1 EU/DL (ref 0–1)
WBC # BLD: 1.9 THOU/MM3 (ref 4.8–10.8)
WBC UA: ABNORMAL /HPF
YEAST: ABNORMAL

## 2019-04-29 PROCEDURE — 96413 CHEMO IV INFUSION 1 HR: CPT

## 2019-04-29 PROCEDURE — 2580000003 HC RX 258: Performed by: INTERNAL MEDICINE

## 2019-04-29 PROCEDURE — 4040F PNEUMOC VAC/ADMIN/RCVD: CPT | Performed by: INTERNAL MEDICINE

## 2019-04-29 PROCEDURE — G8417 CALC BMI ABV UP PARAM F/U: HCPCS | Performed by: INTERNAL MEDICINE

## 2019-04-29 PROCEDURE — 77386 HC NTSTY MODUL RAD TX DLVR CPLX: CPT | Performed by: RADIOLOGY

## 2019-04-29 PROCEDURE — 81001 URINALYSIS AUTO W/SCOPE: CPT

## 2019-04-29 PROCEDURE — 96415 CHEMO IV INFUSION ADDL HR: CPT

## 2019-04-29 PROCEDURE — 4004F PT TOBACCO SCREEN RCVD TLK: CPT | Performed by: INTERNAL MEDICINE

## 2019-04-29 PROCEDURE — 99214 OFFICE O/P EST MOD 30 MIN: CPT | Performed by: INTERNAL MEDICINE

## 2019-04-29 PROCEDURE — 80076 HEPATIC FUNCTION PANEL: CPT

## 2019-04-29 PROCEDURE — 6360000002 HC RX W HCPCS: Performed by: INTERNAL MEDICINE

## 2019-04-29 PROCEDURE — 36415 COLL VENOUS BLD VENIPUNCTURE: CPT

## 2019-04-29 PROCEDURE — 96417 CHEMO IV INFUS EACH ADDL SEQ: CPT

## 2019-04-29 PROCEDURE — 85027 COMPLETE CBC AUTOMATED: CPT

## 2019-04-29 PROCEDURE — 80047 BASIC METABLC PNL IONIZED CA: CPT

## 2019-04-29 PROCEDURE — 96375 TX/PRO/DX INJ NEW DRUG ADDON: CPT

## 2019-04-29 PROCEDURE — 2709999900 HC NON-CHARGEABLE SUPPLY

## 2019-04-29 PROCEDURE — 1123F ACP DISCUSS/DSCN MKR DOCD: CPT | Performed by: INTERNAL MEDICINE

## 2019-04-29 PROCEDURE — 96366 THER/PROPH/DIAG IV INF ADDON: CPT

## 2019-04-29 PROCEDURE — G8427 DOCREV CUR MEDS BY ELIG CLIN: HCPCS | Performed by: INTERNAL MEDICINE

## 2019-04-29 PROCEDURE — G0463 HOSPITAL OUTPT CLINIC VISIT: HCPCS

## 2019-04-29 PROCEDURE — 3017F COLORECTAL CA SCREEN DOC REV: CPT | Performed by: INTERNAL MEDICINE

## 2019-04-29 PROCEDURE — 96367 TX/PROPH/DG ADDL SEQ IV INF: CPT

## 2019-04-29 RX ORDER — SODIUM CHLORIDE 9 MG/ML
INJECTION, SOLUTION INTRAVENOUS ONCE
Status: COMPLETED | OUTPATIENT
Start: 2019-04-29 | End: 2019-04-29

## 2019-04-29 RX ORDER — PALONOSETRON 0.05 MG/ML
0.25 INJECTION, SOLUTION INTRAVENOUS ONCE
Status: COMPLETED | OUTPATIENT
Start: 2019-04-29 | End: 2019-04-29

## 2019-04-29 RX ADMIN — POTASSIUM CHLORIDE: 2 INJECTION, SOLUTION, CONCENTRATE INTRAVENOUS at 13:10

## 2019-04-29 RX ADMIN — SODIUM CHLORIDE 150 MG: 9 INJECTION, SOLUTION INTRAVENOUS at 14:39

## 2019-04-29 RX ADMIN — DEXAMETHASONE SODIUM PHOSPHATE 12 MG: 4 INJECTION, SOLUTION INTRA-ARTICULAR; INTRALESIONAL; INTRAMUSCULAR; INTRAVENOUS; SOFT TISSUE at 14:18

## 2019-04-29 RX ADMIN — MANNITOL: 250 INJECTION, SOLUTION INTRAVENOUS at 15:09

## 2019-04-29 RX ADMIN — SODIUM CHLORIDE: 9 INJECTION, SOLUTION INTRAVENOUS at 13:08

## 2019-04-29 RX ADMIN — POTASSIUM CHLORIDE: 2 INJECTION, SOLUTION, CONCENTRATE INTRAVENOUS at 17:17

## 2019-04-29 RX ADMIN — PALONOSETRON 0.25 MG: 0.05 INJECTION, SOLUTION INTRAVENOUS at 14:16

## 2019-04-29 NOTE — PROGRESS NOTES
Patient assessed for the following post chemotherapy:    Dizziness   No  Lightheadedness  No      Acute nausea/vomiting No  Headache   No  Chest pain/pressure  No  Rash/itching   No  Shortness of breath  No    Patient kept for 20 minutes observation post infusion chemotherapy. Patient tolerated chemotherapy treatment cisplatin without any complications. Last vital signs:   /76   Pulse 89   Temp 97.5 °F (36.4 °C) (Oral)   Resp 18   Ht 5' 7\" (1.702 m)   Wt 191 lb 12.8 oz (87 kg)   SpO2 95%   BMI 30.04 kg/m²       Patient instructed if experience any of the above symptoms following today's infusion,he/she is to notify MD immediately or go to the emergency department. Discharge instructions given to patient. Verbalizes understanding. Ambulated off unit per self in stable condition with belongings.

## 2019-04-29 NOTE — ONCOLOGY
Chemotherapy Administration    Pre-assessment Data: Antineoplastic Agents  Other:   See toxicity flow sheet for assessment [x]     Physician Notification of Concerns Related to Chemotherapy Administration:   Physician Notified Maritza Carlos / Time of Notification      Interventions:   Lab work assessed  [x]   Height / Weight verified for dose [x]   Current MAR reviewed [x]   Emergency drugs available as appropriate [x]   Anaphylaxis assessment completed [x]   Pre-medications administered as ordered [x]   Blood return noted upon initiation of chemotherapy [x]   Blood return noted each 1-2ml of a vesicant medication if given IV push []   Blood return noted each 2-3ml of a non-vesicant medication if given IV push []   Monitor for signs / symptoms of hypersensitivity reaction [x]   Chemotherapy orders (drug/dose/rate) verified by 2 Chemo certified RNs [x]   Monitor IV site and blood return throughout the infusion of the medication [x]   Document IV site checks on the IV assessment form [x]   Document chemotherapy teaching on the Patient Education tab [x]   Document patient verbalizes understanding of medications being administered-  Cisplatin [x]   If IV infiltration, see ONS Guidelines []   Other:      []

## 2019-04-29 NOTE — PLAN OF CARE
Problem: Intellectual/Education/Knowledge Deficit  Goal: Teaching initiated upon admission  Outcome: Met This Shift  Note:   Patient verbalizes understanding to verbal information given on Cisplatin,action and possible side effects. Aware to call MD if develop complications. Intervention: Verbal/written education provided  Note:   Chemotherapy Teaching     What is Chemotherapy   Drug action ? Method of Administration ? Handouts given ? Side Effects  Nausea/vomiting ? Diarrhea ? Fatigue ? Signs / Symptoms of infection ? Neutropenia ? Thrombocytopenia ? Alopecia ? neuropathy ? [place] diet &  the importance of fluids ? Micellaneous  Importance of nutrition ? Importance of oral hygiene ? When to call the MD ?   Monitoring labs ? Use of supportive services ? Explanation of Drug Regimen / Frequency  Day 1 cycle 5 Cisplatin     Comments  Verbalized understanding to drug,action,side effects and when to call MD         Problem: Discharge Planning  Goal: Knowledge of discharge instructions  Description  Knowledge of discharge instructions     Outcome: Met This Shift  Note:   Verbalize understanding of discharge instructions, follow up appointments, and when to call Physician. Intervention: Discharge to appropriate level of care  Note:   Discuss understanding of discharge instructions, follow up appointments and when to call Physician. Care plan reviewed with patient. Patient verbalize understanding of the plan of care and contribute to goal setting.

## 2019-04-29 NOTE — PROGRESS NOTES
Patient returned to infusion area in stable condition after Radiation appt.  Accompanied by Laquita Dillon

## 2019-04-29 NOTE — PATIENT INSTRUCTIONS
1. Proceed with cisplatin today. It is okay to treat with . The dose of cisplatin is reduced from 35 mg/m² to 30 mg/m²  2. The patient will restart radiation treatment today  2.  UA with reflex today  4.   RTC in one week on RTC and labs: CBC, BMP

## 2019-04-30 PROCEDURE — 77386 HC NTSTY MODUL RAD TX DLVR CPLX: CPT | Performed by: RADIOLOGY

## 2019-04-30 ASSESSMENT — ENCOUNTER SYMPTOMS
CHEST TIGHTNESS: 0
ABDOMINAL PAIN: 0
BLOOD IN STOOL: 0
SHORTNESS OF BREATH: 0
RECTAL PAIN: 0
FACIAL SWELLING: 0
NAUSEA: 0
ABDOMINAL DISTENTION: 0
VOMITING: 0
BACK PAIN: 0
SORE THROAT: 0
COUGH: 0
WHEEZING: 0
TROUBLE SWALLOWING: 0
DIARRHEA: 0
COLOR CHANGE: 0
EYE DISCHARGE: 0
CONSTIPATION: 0

## 2019-04-30 NOTE — PROGRESS NOTES
[facility] [address]  [address] [address]  Dept: [phone]  Dept Fax: [phone]: [phone]    Visit Date:4/29/2019     Bernice Mcgee is a 68 y.o. male who presents today for:   Chief Complaint   Patient presents with    Follow-up     BLADDER CA        HPI:   This is a 80-year-old man with muscle invasive bladder cancer. He presents to the medical oncology clinic to discuss neoadjuvant chemotherapy. Patient developed gross hematuria and flank pain end of December 2018. He had CT urogram on December 13, 2018 that showed irregular thickening of the right posterior lateral bladder wall. Findings were highly suspicious for the presence of malignancy. On January 28, 2019 the patient underwent cystoscopy by Dr. Yuridia Tian. It showed bladder trabeculations and bladder stone, on the right side there was a diverticulum with a bladder stone and bladder tumor inside. On January 30, 2019 the patient underwent TURBT. Final pathology report showed invasive high-grade urothelial carcinoma, clear cell variant. Deep smooth muscle was involved by carcinoma. Dr. Yuridia Tian recommended neoadjuvant chemotherapy before bladder resection. However, the patient is absolutely opposed to having a bladder surgery. He agreed to concurrent chemoradiation with understanding that outcome is inferior to neoadjuvant chemo and surgery. Interval history on 04/29/2019:  The patient presents to the medical oncology clinic for 5th infusion of weekly cisplatin. His cisplatin has been on hold since April 8, 2019 due to neutropenia and thrombocytopenia. Overall he has tolerated last infusion well. He feels a little bit more tired, but he denies having any nausea, no vomiting. He denies having any peripheral neuropathy, no hearing problems. He denies hematuria. Denies having any fevers, no flank pain.   No active bleeding  HPI   Past Medical History:   Diagnosis Date    Anxiety     Mouth/Throat: Oropharynx is clear and moist. No oropharyngeal exudate. Eyes: Pupils are equal, round, and reactive to light. EOM are normal. Right eye exhibits no discharge. Left eye exhibits no discharge. No scleral icterus. Neck: Normal range of motion. Neck supple. No JVD present. No tracheal deviation present. No thyromegaly present. Cardiovascular: Normal rate and normal heart sounds. Exam reveals no gallop and no friction rub. No murmur heard. Pulmonary/Chest: Effort normal and breath sounds normal. No stridor. No respiratory distress. He has no wheezes. He has no rales. He exhibits no tenderness. Abdominal: Soft. Bowel sounds are normal. He exhibits no distension and no mass. There is no tenderness. There is no rebound. Musculoskeletal: Normal range of motion. He exhibits no edema. Good range of motion in all four extremities. Lymphadenopathy:     He has no cervical adenopathy. Neurological: He is alert and oriented to person, place, and time. He has normal reflexes. No cranial nerve deficit. He exhibits normal muscle tone. Skin: Skin is warm. No rash noted. No erythema. Psychiatric: He has a normal mood and affect. His behavior is normal. Judgment and thought content normal.   Vitals reviewed. /60 (Site: Left Upper Arm, Position: Sitting, Cuff Size: Large Adult)   Pulse 89   Temp 97.5 °F (36.4 °C) (Oral)   Resp 18   Ht 5' 7.01\" (1.702 m)   Wt 191 lb 12.8 oz (87 kg)   SpO2 95%   BMI 30.03 kg/m²      ECOG status is 0. Imaging studies and labs:     CT of the abdomen on December 13, 2018 showed: There is a right posterior lateral bladder diverticulum which contains both enhancing soft tissue as well as a large calculus. There is a 17 mm bladder calculus in addition there is irregular thickening of the right posterior lateral bladder    wall and these findings are highly suspicious for the presence of neoplasia         CT of the chest on January 4, 2019 showed:  1.  Bochdalek hernia with resultant intrathoracic stomach and large amount of mesenteric fat in the left hemithorax. 2. 5 mm noncalcified nodule right middle lobe. One-year follow-up recommended. Lab Results   Component Value Date    WBC 1.9 (L) 04/29/2019    HGB 12.4 (L) 04/29/2019    HCT 35.4 (L) 04/29/2019    MCV 88 04/29/2019     04/29/2019       Chemistry        Component Value Date/Time     04/29/2019 1130     01/31/2019 0814    K 4.5 04/29/2019 1130    K 4.2 01/31/2019 0814     01/31/2019 0814    CO2 22 (L) 01/31/2019 0814    BUN 9 01/31/2019 0814    CREATININE 0.7 04/29/2019 1130    CREATININE 0.6 04/08/2019 1015        Component Value Date/Time    CALCIUM 8.1 (L) 01/31/2019 0814    ALKPHOS 130 (H) 04/29/2019 1130    AST 12 04/29/2019 1130    ALT 10 (L) 04/29/2019 1130    BILITOT 0.4 04/29/2019 1130        MRI of the pelvis on March 5, 2019 showed:  1. There is a diverticulum extending from the posterior margin of the right lateral wall the urinary bladder with an associated enhancing mass lesion. 2. The diverticulum including the mass lesion measures 6.0 x 3.7 x 3.9 cm in longitudinal, transverse and AP dimensions. The diverticulum excluding the mass lesion measures 3.6 x 3.1 x 3.9 cm in longitudinal, transverse and AP dimensions. The    diverticular mass measures 4.3 x 3.3 x 3.9 cm.   3. The enhancing mass involves the distal margins of the lateral wall of the diverticulum and also the posterior margin of the lateral wall of the urinary bladder adjacent to this region. This mass is low signal intensity on the T1 and T2-weighted          Assessment:        Diagnosis Orders   1. Urothelial carcinoma of bladder ([address]  Urine Rt Reflex To Culture   2. Gross hematuria  Urine Rt Reflex To Culture        Plan:   1.  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   I discuss with the patient the benefit of neoadjuvant chemotherapy, the schedule and possible side effects of treatment. Despite radical cystectomy, approximately one-half of patients with muscle-invasive urothelial bladder cancer involving the muscularis propria will develop metastatic disease within two years. Therefore the preferred management of patients with muscle-invasive bladder cancer consists of a multimodal approach comprising neoadjuvant chemotherapy followed by radical cystectomy. This approach is supported by  evidence that neoadjuvant cisplatin-based chemotherapy improves survival compared with locoregional treatment alone. A 2003 meta-analysis of 11 randomized trials that compared cisplatin-based neoadjuvant chemotherapy plus local therapy versus local therapy alone showed an improvement in overall survival (5-year OS 50 versus 45%) and a lower risk of recurrence (HR for recurrence 0.81). Choice of chemotherapy regimens include MVAC for healthy patients younger than 79years of age. In older patients and those unable to tolerate this combination due to medical comorbidities, gemcitabine plus cisplatin (GC) is a reasonable alternative. The patient is quite functional, without contraindications for cisplatin, therefore he should be able to tolerate the gemcitabine and cisplatin. He had MRI of the pelvis that showed a diverticulum extending from the posterior margin of the right lateral wall the urinary bladder with an associated enhancing mass lesion which measures 3.6 x 3.1 x 3.9 cm in size. The patient refused to have surgery. During original visit we discussed the alternative options. I explained to him that the only potentially curative option is surgery. For patients who are  medically inoperable due to comorbidity and frailty, chemoradiation may be a reasonable alternative.  Some elderly patients with invasive bladder cancer can be cured by cisplatin-based regimen and radiation therapy or at least effectively palliated for sustained periods measured in months to

## 2019-05-01 ENCOUNTER — CLINICAL DOCUMENTATION (OUTPATIENT)
Dept: NUTRITION | Age: 74
End: 2019-05-01

## 2019-05-01 ENCOUNTER — HOSPITAL ENCOUNTER (OUTPATIENT)
Dept: RADIATION ONCOLOGY | Age: 74
Discharge: HOME | End: 2019-05-01
Payer: MEDICARE

## 2019-05-01 PROCEDURE — 77386 HC NTSTY MODUL RAD TX DLVR CPLX: CPT | Performed by: RADIOLOGY

## 2019-05-02 PROCEDURE — 77386 HC NTSTY MODUL RAD TX DLVR CPLX: CPT | Performed by: RADIOLOGY

## 2019-05-03 DIAGNOSIS — C67.9 UROTHELIAL CARCINOMA OF BLADDER (HCC): Primary | ICD-10-CM

## 2019-05-03 PROCEDURE — 77386 HC NTSTY MODUL RAD TX DLVR CPLX: CPT | Performed by: RADIOLOGY

## 2019-05-05 RX ORDER — HEPARIN SODIUM (PORCINE) LOCK FLUSH IV SOLN 100 UNIT/ML 100 UNIT/ML
500 SOLUTION INTRAVENOUS PRN
Status: CANCELLED | OUTPATIENT
Start: 2019-05-06

## 2019-05-05 RX ORDER — SODIUM CHLORIDE 9 MG/ML
INJECTION, SOLUTION INTRAVENOUS CONTINUOUS
Status: CANCELLED | OUTPATIENT
Start: 2019-05-06

## 2019-05-05 RX ORDER — SODIUM CHLORIDE 0.9 % (FLUSH) 0.9 %
5 SYRINGE (ML) INJECTION PRN
Status: CANCELLED | OUTPATIENT
Start: 2019-05-06

## 2019-05-05 RX ORDER — PALONOSETRON 0.05 MG/ML
0.25 INJECTION, SOLUTION INTRAVENOUS ONCE
Status: CANCELLED | OUTPATIENT
Start: 2019-05-06

## 2019-05-05 RX ORDER — SODIUM CHLORIDE 9 MG/ML
INJECTION, SOLUTION INTRAVENOUS ONCE
Status: CANCELLED | OUTPATIENT
Start: 2019-05-06

## 2019-05-05 RX ORDER — METHYLPREDNISOLONE SODIUM SUCCINATE 125 MG/2ML
125 INJECTION, POWDER, LYOPHILIZED, FOR SOLUTION INTRAMUSCULAR; INTRAVENOUS ONCE
Status: CANCELLED | OUTPATIENT
Start: 2019-05-06

## 2019-05-05 RX ORDER — 0.9 % SODIUM CHLORIDE 0.9 %
10 VIAL (ML) INJECTION ONCE
Status: CANCELLED | OUTPATIENT
Start: 2019-05-06

## 2019-05-05 RX ORDER — DIPHENHYDRAMINE HYDROCHLORIDE 50 MG/ML
50 INJECTION INTRAMUSCULAR; INTRAVENOUS ONCE
Status: CANCELLED | OUTPATIENT
Start: 2019-05-06

## 2019-05-05 RX ORDER — SODIUM CHLORIDE 0.9 % (FLUSH) 0.9 %
10 SYRINGE (ML) INJECTION PRN
Status: CANCELLED | OUTPATIENT
Start: 2019-05-06

## 2019-05-06 ENCOUNTER — HOSPITAL ENCOUNTER (OUTPATIENT)
Dept: INFUSION THERAPY | Age: 74
Discharge: HOME | End: 2019-05-06
Payer: MEDICARE

## 2019-05-06 ENCOUNTER — OFFICE VISIT (OUTPATIENT)
Dept: ONCOLOGY | Age: 74
End: 2019-05-06
Payer: MEDICARE

## 2019-05-06 VITALS
TEMPERATURE: 96.8 F | HEART RATE: 90 BPM | HEIGHT: 67 IN | WEIGHT: 192.6 LBS | OXYGEN SATURATION: 94 % | BODY MASS INDEX: 30.23 KG/M2 | DIASTOLIC BLOOD PRESSURE: 61 MMHG | RESPIRATION RATE: 18 BRPM | SYSTOLIC BLOOD PRESSURE: 129 MMHG

## 2019-05-06 VITALS
WEIGHT: 192.6 LBS | BODY MASS INDEX: 30.23 KG/M2 | SYSTOLIC BLOOD PRESSURE: 126 MMHG | OXYGEN SATURATION: 94 % | HEART RATE: 69 BPM | HEIGHT: 67 IN | TEMPERATURE: 98.1 F | DIASTOLIC BLOOD PRESSURE: 69 MMHG | RESPIRATION RATE: 18 BRPM

## 2019-05-06 DIAGNOSIS — Z51.11 ENCOUNTER FOR CHEMOTHERAPY MANAGEMENT: ICD-10-CM

## 2019-05-06 DIAGNOSIS — C67.9 UROTHELIAL CARCINOMA OF BLADDER (HCC): Primary | ICD-10-CM

## 2019-05-06 DIAGNOSIS — C67.8 MALIGNANT NEOPLASM OF OVERLAPPING SITES OF BLADDER (HCC): ICD-10-CM

## 2019-05-06 LAB
ALBUMIN SERPL-MCNC: 3.6 G/DL (ref 3.5–5.1)
ALP BLD-CCNC: 115 U/L (ref 38–126)
ALT SERPL-CCNC: 10 U/L (ref 11–66)
AST SERPL-CCNC: 14 U/L (ref 5–40)
BILIRUB SERPL-MCNC: 0.3 MG/DL (ref 0.3–1.2)
BILIRUBIN DIRECT: < 0.2 MG/DL (ref 0–0.3)
BUN, WHOLE BLOOD: 15 MG/DL (ref 8–26)
CHLORIDE, WHOLE BLOOD: 100 MEQ/L (ref 98–109)
CREATININE, WHOLE BLOOD: 0.7 MG/DL (ref 0.5–1.2)
GFR, ESTIMATED: > 90 ML/MIN/1.73M2
GLUCOSE, WHOLE BLOOD: 142 MG/DL (ref 70–108)
HCT VFR BLD CALC: 36.3 % (ref 42–52)
HEMOGLOBIN: 12.5 GM/DL (ref 14–18)
IONIZED CALCIUM, WHOLE BLOOD: 1.23 MMOL/L (ref 1.12–1.32)
MCH RBC QN AUTO: 30.7 PG (ref 27–31)
MCHC RBC AUTO-ENTMCNC: 34.6 GM/DL (ref 33–37)
MCV RBC AUTO: 89 FL (ref 80–94)
PDW BLD-RTO: 12.1 % (ref 11.5–14.5)
PLATELET # BLD: 238 THOU/MM3 (ref 130–400)
PMV BLD AUTO: 6.4 FL (ref 7.4–10.4)
POTASSIUM, WHOLE BLOOD: 4.2 MEQ/L (ref 3.5–4.9)
RBC # BLD: 4.09 MILL/MM3 (ref 4.7–6.1)
SEG NEUTROPHILS: 61 % (ref 43–75)
SEGMENTED NEUTROPHILS ABSOLUTE COUNT: 2.5 THOU/MM3 (ref 1.8–7.7)
SODIUM, WHOLE BLOOD: 140 MEQ/L (ref 138–146)
TOTAL CO2, WHOLE BLOOD: 28 MEQ/L (ref 23–33)
TOTAL PROTEIN: 6.2 G/DL (ref 6.1–8)
WBC # BLD: 4.1 THOU/MM3 (ref 4.8–10.8)

## 2019-05-06 PROCEDURE — 85027 COMPLETE CBC AUTOMATED: CPT

## 2019-05-06 PROCEDURE — 1123F ACP DISCUSS/DSCN MKR DOCD: CPT | Performed by: INTERNAL MEDICINE

## 2019-05-06 PROCEDURE — 96366 THER/PROPH/DIAG IV INF ADDON: CPT

## 2019-05-06 PROCEDURE — 4004F PT TOBACCO SCREEN RCVD TLK: CPT | Performed by: INTERNAL MEDICINE

## 2019-05-06 PROCEDURE — 4040F PNEUMOC VAC/ADMIN/RCVD: CPT | Performed by: INTERNAL MEDICINE

## 2019-05-06 PROCEDURE — 96415 CHEMO IV INFUSION ADDL HR: CPT

## 2019-05-06 PROCEDURE — 96375 TX/PRO/DX INJ NEW DRUG ADDON: CPT

## 2019-05-06 PROCEDURE — 3017F COLORECTAL CA SCREEN DOC REV: CPT | Performed by: INTERNAL MEDICINE

## 2019-05-06 PROCEDURE — 36415 COLL VENOUS BLD VENIPUNCTURE: CPT

## 2019-05-06 PROCEDURE — G0463 HOSPITAL OUTPT CLINIC VISIT: HCPCS

## 2019-05-06 PROCEDURE — 6360000002 HC RX W HCPCS: Performed by: INTERNAL MEDICINE

## 2019-05-06 PROCEDURE — 99214 OFFICE O/P EST MOD 30 MIN: CPT | Performed by: INTERNAL MEDICINE

## 2019-05-06 PROCEDURE — 2709999900 HC NON-CHARGEABLE SUPPLY

## 2019-05-06 PROCEDURE — 77386 HC NTSTY MODUL RAD TX DLVR CPLX: CPT | Performed by: RADIOLOGY

## 2019-05-06 PROCEDURE — 77336 RADIATION PHYSICS CONSULT: CPT | Performed by: RADIOLOGY

## 2019-05-06 PROCEDURE — 96413 CHEMO IV INFUSION 1 HR: CPT

## 2019-05-06 PROCEDURE — 80047 BASIC METABLC PNL IONIZED CA: CPT

## 2019-05-06 PROCEDURE — 80076 HEPATIC FUNCTION PANEL: CPT

## 2019-05-06 PROCEDURE — 96367 TX/PROPH/DG ADDL SEQ IV INF: CPT

## 2019-05-06 PROCEDURE — 2580000003 HC RX 258: Performed by: INTERNAL MEDICINE

## 2019-05-06 PROCEDURE — G8427 DOCREV CUR MEDS BY ELIG CLIN: HCPCS | Performed by: INTERNAL MEDICINE

## 2019-05-06 PROCEDURE — G8417 CALC BMI ABV UP PARAM F/U: HCPCS | Performed by: INTERNAL MEDICINE

## 2019-05-06 RX ORDER — SODIUM CHLORIDE 9 MG/ML
INJECTION, SOLUTION INTRAVENOUS ONCE
Status: COMPLETED | OUTPATIENT
Start: 2019-05-06 | End: 2019-05-06

## 2019-05-06 RX ORDER — PALONOSETRON 0.05 MG/ML
0.25 INJECTION, SOLUTION INTRAVENOUS ONCE
Status: COMPLETED | OUTPATIENT
Start: 2019-05-06 | End: 2019-05-06

## 2019-05-06 RX ADMIN — PALONOSETRON 0.25 MG: 0.05 INJECTION, SOLUTION INTRAVENOUS at 14:11

## 2019-05-06 RX ADMIN — MANNITOL: 250 INJECTION, SOLUTION INTRAVENOUS at 14:20

## 2019-05-06 RX ADMIN — DEXAMETHASONE SODIUM PHOSPHATE 12 MG: 4 INJECTION, SOLUTION INTRA-ARTICULAR; INTRALESIONAL; INTRAMUSCULAR; INTRAVENOUS; SOFT TISSUE at 12:50

## 2019-05-06 RX ADMIN — SODIUM CHLORIDE 150 MG: 9 INJECTION, SOLUTION INTRAVENOUS at 13:30

## 2019-05-06 RX ADMIN — SODIUM CHLORIDE: 9 INJECTION, SOLUTION INTRAVENOUS at 11:41

## 2019-05-06 RX ADMIN — POTASSIUM CHLORIDE: 2 INJECTION, SOLUTION, CONCENTRATE INTRAVENOUS at 11:45

## 2019-05-06 RX ADMIN — POTASSIUM CHLORIDE: 2 INJECTION, SOLUTION, CONCENTRATE INTRAVENOUS at 16:41

## 2019-05-06 ASSESSMENT — ENCOUNTER SYMPTOMS
RECTAL PAIN: 0
VOMITING: 0
SHORTNESS OF BREATH: 0
BACK PAIN: 0
ABDOMINAL PAIN: 0
COLOR CHANGE: 0
CHEST TIGHTNESS: 0
FACIAL SWELLING: 0
BLOOD IN STOOL: 0
SORE THROAT: 0
TROUBLE SWALLOWING: 0
NAUSEA: 0
CONSTIPATION: 0
COUGH: 0
EYE DISCHARGE: 0
ABDOMINAL DISTENTION: 0
DIARRHEA: 0
WHEEZING: 0

## 2019-05-06 NOTE — ONCOLOGY
Chemotherapy Administration    Pre-assessment Data: Antineoplastic Agents  Other:   See toxicity flow sheet for assessment [x]     Physician Notification of Concerns Related to Chemotherapy Administration:   Physician Notified Hortensia Mitchell / Time of Notification Dr Bety Meadows seen today.      Interventions:   Lab work assessed  [x]   Height / Weight verified for dose [x]   Current MAR reviewed [x]   Emergency drugs available as appropriate [x]   Anaphylaxis assessment completed [x]   Pre-medications administered as ordered [x]   Blood return noted upon initiation of chemotherapy [x]   Blood return noted each 1-2ml of a vesicant medication if given IV push []   Blood return noted each 2-3ml of a non-vesicant medication if given IV push []   Monitor for signs / symptoms of hypersensitivity reaction [x]   Chemotherapy orders (drug/dose/rate) verified by 2 Chemo certified RNs [x]   Monitor IV site and blood return throughout the infusion of the medication [x]   Document IV site checks on the IV assessment form [x]   Document chemotherapy teaching on the Patient Education tab [x]   Document patient verbalizes understanding of medications being administered [x]   If IV infiltration, see ONS Guidelines []   Other:   cisplatin   [x]

## 2019-05-06 NOTE — PLAN OF CARE
Problem: Intellectual/Education/Knowledge Deficit  Goal: Teaching initiated upon admission  Outcome: Met This Shift  Note:   Patient verbalizes understanding to verbal information given on cisplatin,action and possible side effects. Aware to call MD if develop complications. Intervention: Verbal/written education provided  Note:   Chemotherapy Teaching     What is Chemotherapy   Drug action ? Method of Administration ? Handouts given ? Side Effects  Nausea/vomiting ? Diarrhea ? Fatigue ? Signs / Symptoms of infection ? Neutropenia ? Thrombocytopenia ? Alopecia ? neuropathy ? [place] diet &  the importance of fluids ? Micellaneous  Importance of nutrition ? Importance of oral hygiene ? When to call the MD ?   Monitoring labs ? Use of supportive services ? Explanation of Drug Regimen / Frequency  cisplatin     Comments  Verbalized understanding to drug,action,side effects and when to call MD         Problem: Discharge Planning  Goal: Knowledge of discharge instructions  Description  Knowledge of discharge instructions     Outcome: Met This Shift  Note:   Verbalize understanding of discharge instructions, follow up appointments, and when to call Physician. Intervention: Discharge to appropriate level of care  Note:   Provide discharge instructions. Problem: Falls - Risk of:  Goal: Will remain free from falls  Description  Will remain free from falls  Outcome: Met This Shift  Note:   Free from falls while in [facility]. Intervention: Assess risk factors for falls  Note:   Discussed the need to use the call light for assistance when getting up to ambulate. Call light within reach. Care plan reviewed with patient. Patient verbalize understanding of the plan of care and contribute to goal setting.

## 2019-05-06 NOTE — PROGRESS NOTES
[facility] [address] [place] [address] 200  [address] [address]  Dept: [phone]  Dept Fax: [phone]: [phone]    Visit Date:5/6/2019     Hilary Valentine is a 68 y.o. male who presents today for:   Chief Complaint   Patient presents with    Follow-up     Bladder Cancer        HPI:   This is a 63-year-old man with muscle invasive bladder cancer. He presents to the medical oncology clinic to discuss neoadjuvant chemotherapy. Patient developed gross hematuria and flank pain end of December 2018. He had CT urogram on December 13, 2018 that showed irregular thickening of the right posterior lateral bladder wall. Findings were highly suspicious for the presence of malignancy. On January 28, 2019 the patient underwent cystoscopy by Dr. Michelle Ann. It showed bladder trabeculations and bladder stone, on the right side there was a diverticulum with a bladder stone and bladder tumor inside. On January 30, 2019 the patient underwent TURBT. Final pathology report showed invasive high-grade urothelial carcinoma, clear cell variant. Deep smooth muscle was involved by carcinoma. Dr. Michelle Ann recommended neoadjuvant chemotherapy before bladder resection. However, the patient was absolutely opposed to having a bladder surgery. He agreed to concurrent chemoradiation with understanding that outcome is inferior to neoadjuvant chemo and surgery. Interval history on 05/06/2019:  The patient presents to the medical oncology clinic for 6th infusion of weekly cisplatin. His cisplatin has been on hold since April 8, 2019 due to neutropenia and thrombocytopenia. His cycle#5 was given with dose reduction of cisplatin from 35 mg/m² to 30 mg/m². Overall he tolerated much better. Patient will complete radiation treatment next week. He feels a little bit more tired, but he denies having any nausea, no vomiting. He denies having any peripheral neuropathy, no hearing problems.  He denies hematuria. Denies having any fevers, no flank pain.   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     SINCE CHILDHOOD    Thyroid disease     Type 2 diabetes mellitus without complication (HCC)       Past Surgical History:   Procedure Laterality Date    CYSTOSCOPY N/A 1/30/2019    TRANSURETHRAL RESECTION BLADDER TUMOR, CYSTOLITHOLAPAXY performed by Marzena Stein MD at [address] [place] [address]        Family History   Problem Relation Age of Onset    Other Mother         BRAIN TUMOR    Heart Disease Father       Social History     Tobacco Use    Smoking status: Current Every Day Smoker     Packs/day: 1.50     Years: 53.00     Pack years: 79.50     Types: Cigarettes    Smokeless tobacco: Never Used    Tobacco comment: depending on tumor analysis   Substance Use Topics    Alcohol use: No     Comment: not in 40 years      Current Outpatient Medications   Medication Sig Dispense Refill    diclofenac (VOLTAREN) 50 MG EC tablet Take 1 tablet by mouth 2 times daily 60 tablet 3    linagliptin (TRADJENTA) 5 MG tablet Take 5 mg by mouth daily      Multiple Vitamins-Minerals ([employer] SPECTRAVITE ADULT 50+ PO) Take by mouth      Saw [place] 450 MG CAPS Take by mouth daily Indications: 3-4 times per week PRN       atorvastatin (LIPITOR) 40 MG tablet Take 40 mg by mouth daily      metFORMIN (GLUCOPHAGE) 500 MG tablet Take 1,000 mg by mouth 2 times daily (with meals)       levothyroxine (SYNTHROID) 125 MCG tablet Take 125 mcg by mouth Daily      prednisoLONE acetate (PRED FORTE) 1 % ophthalmic suspension 1 drop 4 times daily      timolol (BETIMOL) 0.5 % ophthalmic solution 1 drop 2 times daily      brimonidine (ALPHAGAN) 0.2 % ophthalmic solution 1 drop 3 times daily      cyclopentolate (CYCLOGYL) 1 % ophthalmic solution 1 drop once       No current facility-administered medications for this visit. Facility-Administered Medications Ordered in Other Visits   Medication Dose Route Frequency Provider Last Rate Last Dose    0.9 % sodium chloride infusion   Intravenous Once Aries Rivas MD 20 mL/hr at 05/06/19 1141      CISplatin (PLATINOL) 64 mg, mannitol 12.5 g in sodium chloride 0.9 % 500 mL chemo IVPB   Intravenous Once Aries Rivas  mL/hr at 05/06/19 1420      potassium chloride 10 mEq, magnesium sulfate 1 g in sodium chloride 0.9 % 500 mL IVPB   Intravenous Once Aries MD Evelyn          No Known Allergies   Health Maintenance   Topic Date Due    Hepatitis C screen  1945    DTaP/Tdap/Td vaccine (1 - Tdap) 12/16/1964    Lipid screen  12/16/1985    Shingles Vaccine (1 of 2) 12/16/1995    Colon cancer screen colonoscopy  12/16/1995    Pneumococcal 65+ years Vaccine (1 of 2 - PCV13) 12/16/2010    Flu vaccine (Season Ended) 09/01/2019    Low dose CT lung screening  01/04/2020    AAA screen  Completed        Subjective:   Review of Systems   Constitutional: Negative for activity change, appetite change, fatigue and fever. HENT: Negative for congestion, dental problem, facial swelling, hearing loss, mouth sores, nosebleeds, sore throat, tinnitus and trouble swallowing. Eyes: Negative for discharge and visual disturbance. Respiratory: Negative for cough, chest tightness, shortness of breath and wheezing. Cardiovascular: Negative for chest pain, palpitations and leg swelling. Gastrointestinal: Negative for abdominal distention, abdominal pain, blood in stool, constipation, diarrhea, nausea, rectal pain and vomiting. Endocrine: Negative for cold intolerance, polydipsia and polyuria. Genitourinary: Negative for decreased urine volume, difficulty urinating, dysuria, flank pain, hematuria and urgency.    Musculoskeletal: Negative for arthralgias, back pain, gait problem, joint swelling, myalgias and neck stiffness. Skin: Negative for color change, rash and wound. Neurological: Negative for dizziness, tremors, seizures, speech difficulty, weakness, light-headedness, numbness and headaches. Hematological: Negative for adenopathy. Does not bruise/bleed easily. Psychiatric/Behavioral: Negative for confusion and sleep disturbance. The patient is not nervous/anxious. Objective:   Physical Exam   Constitutional: He is oriented to person, place, and time. He appears well-developed and well-nourished. No distress. HENT:   Head: Normocephalic. Mouth/Throat: Oropharynx is clear and moist. No oropharyngeal exudate. Eyes: Pupils are equal, round, and reactive to light. EOM are normal. Right eye exhibits no discharge. Left eye exhibits no discharge. No scleral icterus. Neck: Normal range of motion. Neck supple. No JVD present. No tracheal deviation present. No thyromegaly present. Cardiovascular: Normal rate and normal heart sounds. Exam reveals no gallop and no friction rub. No murmur heard. Pulmonary/Chest: Effort normal and breath sounds normal. No stridor. No respiratory distress. He has no wheezes. He has no rales. He exhibits no tenderness. Abdominal: Soft. Bowel sounds are normal. He exhibits no distension and no mass. There is no tenderness. There is no rebound. Musculoskeletal: Normal range of motion. He exhibits no edema. Good range of motion in all four extremities. Lymphadenopathy:     He has no cervical adenopathy. Neurological: He is alert and oriented to person, place, and time. He has normal reflexes. No cranial nerve deficit. He exhibits normal muscle tone. Skin: Skin is warm. No rash noted. No erythema. Psychiatric: He has a normal mood and affect. His behavior is normal. Judgment and thought content normal.   Vitals reviewed.      /61 (Site: Left Upper Arm, Position: Sitting, Cuff Size: Large Adult)   Pulse 90   Temp 96.8 °F (36 °C) (Tympanic)   Resp 18   Ht 5' 7\" (1.702 m)   Wt 192 lb 9.6 oz (87.4 kg)   SpO2 94%   BMI 30.17 kg/m²      ECOG status is 0. Imaging studies and labs:     CT of the abdomen on December 13, 2018 showed: There is a right posterior lateral bladder diverticulum which contains both enhancing soft tissue as well as a large calculus. There is a 17 mm bladder calculus in addition there is irregular thickening of the right posterior lateral bladder    wall and these findings are highly suspicious for the presence of neoplasia         CT of the chest on January 4, 2019 showed:  1.  Bochdalek hernia with resultant intrathoracic stomach and large amount of mesenteric fat in the left hemithorax. 2. 5 mm noncalcified nodule right middle lobe. One-year follow-up recommended. Lab Results   Component Value Date    WBC 4.1 (L) 05/06/2019    HGB 12.5 (L) 05/06/2019    HCT 36.3 (L) 05/06/2019    MCV 89 05/06/2019     05/06/2019       Chemistry        Component Value Date/Time     05/06/2019 1009     01/31/2019 0814    K 4.2 05/06/2019 1009    K 4.2 01/31/2019 0814     01/31/2019 0814    CO2 22 (L) 01/31/2019 0814    BUN 9 01/31/2019 0814    CREATININE 0.7 05/06/2019 1009    CREATININE 0.6 04/08/2019 1015        Component Value Date/Time    CALCIUM 8.1 (L) 01/31/2019 0814    ALKPHOS 115 05/06/2019 1009    AST 14 05/06/2019 1009    ALT 10 (L) 05/06/2019 1009    BILITOT 0.3 05/06/2019 1009        MRI of the pelvis on March 5, 2019 showed:  1. There is a diverticulum extending from the posterior margin of the right lateral wall the urinary bladder with an associated enhancing mass lesion. 2. The diverticulum including the mass lesion measures 6.0 x 3.7 x 3.9 cm in longitudinal, transverse and AP dimensions. The diverticulum excluding the mass lesion measures 3.6 x 3.1 x 3.9 cm in longitudinal, transverse and AP dimensions.  The    diverticular mass measures 4.3 x 3.3 x 3.9 cm.   3. The enhancing mass involves the distal margins of the lateral wall of the diverticulum and also the posterior margin of the lateral wall of the urinary bladder adjacent to this region. This mass is low signal intensity on the T1 and T2-weighted          Assessment:        Diagnosis Orders   1. Urothelial carcinoma of bladder ([address]     2. Encounter for chemotherapy management          Plan:   1. 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   I discuss with the patient the benefit of neoadjuvant chemotherapy, the schedule and possible side effects of treatment. Despite radical cystectomy, approximately one-half of patients with muscle-invasive urothelial bladder cancer involving the muscularis propria will develop metastatic disease within two years. Therefore the preferred management of patients with muscle-invasive bladder cancer consists of a multimodal approach comprising neoadjuvant chemotherapy followed by radical cystectomy. This approach is supported by  evidence that neoadjuvant cisplatin-based chemotherapy improves survival compared with locoregional treatment alone. A 2003 meta-analysis of 11 randomized trials that compared cisplatin-based neoadjuvant chemotherapy plus local therapy versus local therapy alone showed an improvement in overall survival (5-year OS 50 versus 45%) and a lower risk of recurrence (HR for recurrence 0.81). Choice of chemotherapy regimens include MVAC for healthy patients younger than 79years of age. In older patients and those unable to tolerate this combination due to medical comorbidities, gemcitabine plus cisplatin (GC) is a reasonable alternative. The patient is quite functional, without contraindications for cisplatin, therefore he should be able to tolerate the gemcitabine and cisplatin.   He had MRI of the pelvis that showed a diverticulum extending from the posterior margin of the right lateral wall the urinary bladder with an associated enhancing mass lesion which measures 3.6 x 3.1 x 3.9 cm in size. The patient refused to have surgery. During original visit we discussed the alternative options. I explained to him that the only potentially curative option is surgery. For patients who are  medically inoperable due to comorbidity and frailty, chemoradiation may be a reasonable alternative. Some elderly patients with invasive bladder cancer can be cured by cisplatin-based regimen and radiation therapy or at least effectively palliated for sustained periods measured in months to years. The patient understand that this is not the preferred and recommended approach, but he decided to proceed with this therapy. 2.  Concurrent chemoradiation. He tolerated 5th infusion of cisplatin well. Cycle #5 was given with dose reduction of cisplatin from 35 mg/m² to 30 mg/m². He tolerated this dose reduction very well. He is not neutropenic nor thrombocytopenic anymore. He denies having any side effects. Mild tiredness. The patient was instructed to continue Decadron 8 mg po daily on days 2-4 for acute CIV. He was also instructed to use compazine PRN. He will RTC in one week to see me 8, for labs: CBC, BMP, and last dose of weekly cisplatin  3. Since he reports urinary frequency we checked his UA last week, no evidence of bacterial growth. Return in about 1 week (around 5/13/2019). Orders Placed:   No orders of the defined types were placed in this encounter. Medications Prescribed:   No orders of the defined types were placed in this encounter.

## 2019-05-06 NOTE — PROGRESS NOTES
Name: Kalyn Fish  : 1945  MRN: [id number]    Oncology Navigation Follow-Up Note    Contact Type:  Medical Oncology - bladder cancer    Subjective: Here to see doctor & get chemo    Objective: WBC 4.1  HGB 12.5    BUN 15 Crea 0.7   Chemotherapy today:  Cisplatin Cycle 6    Did well after cycle 5 with dose reduction. No nausea. Denies Numbness or tingling or hearing loss. Pt shares decreased appetite after chemo; Nitesh encouraged Nutritional supplements when he is not eating well. Tongue with coating. ONC recommending: Biotene mouthwash 2x per day. Voices some continued burning with urination; Urine Culture completed last week->negative for UTI     ONC Plan:  Cycle 7 Chemo next week, finish out Radiation Therapy (8 or 9 fxs remain)\  Break x4-6 weeks; ONC will order imagine studies. Pt return to Dr. Kael Brown for Cystoscopy. Pt shared his hope that the bladder tumor is gone, & prefers not to have cystoscopy again-> to which ONC informed pt that direct visualization completed by Urology, will be ongoing & needed. Emotional support provided to pt. Assistance Needed: Denies today    Receptive to Advanced Care Planning / Palliative Care:  deferred    Referrals: N/A    Education: ONC education re: cystoscopy role in his care management. Notes: Cycle #6 Cisplatin today    Nitesh cont to follow & support as needed.       Electronically signed by Paulina Polanco RN on 2019 at 4:58 PM

## 2019-05-06 NOTE — PROGRESS NOTES
Patient assessed for the following post chemotherapy:    Dizziness   No  Lightheadedness  No     Acute nausea/vomiting No  Headache   No  Chest pain/pressure  No  Rash/itching   No  Shortness of breath  No    Patient kept for 20 minutes observation post infusion chemotherapy. Patient tolerated chemotherapy treatment cisplatin without any complications. Last vital signs:   /69   Pulse 69   Temp 98.1 °F (36.7 °C) (Oral)   Resp 18   Ht 5' 7\" (1.702 m)   Wt 192 lb 9.6 oz (87.4 kg)   SpO2 94%   BMI 30.17 kg/m²     Patient instructed if experience any of the above symptoms following today's infusion,he/she is to notify MD immediately or go to the emergency department. Discharge instructions given to patient. Verbalizes understanding. Ambulated off unit per self with belongings.

## 2019-05-06 NOTE — PATIENT INSTRUCTIONS
1. Proceed with cisplatin 30 mg/m² today  2.   RTC in one week to see me 8, for labs: CBC, BMP, and last dose of weekly cisplatin

## 2019-05-07 ENCOUNTER — CLINICAL DOCUMENTATION (OUTPATIENT)
Dept: NUTRITION | Age: 74
End: 2019-05-07

## 2019-05-07 PROCEDURE — 77386 HC NTSTY MODUL RAD TX DLVR CPLX: CPT | Performed by: RADIOLOGY

## 2019-05-08 PROCEDURE — 77386 HC NTSTY MODUL RAD TX DLVR CPLX: CPT | Performed by: RADIOLOGY

## 2019-05-09 PROCEDURE — 77386 HC NTSTY MODUL RAD TX DLVR CPLX: CPT | Performed by: RADIOLOGY

## 2019-05-10 DIAGNOSIS — C67.9 UROTHELIAL CARCINOMA OF BLADDER (HCC): Primary | ICD-10-CM

## 2019-05-10 PROCEDURE — 77386 HC NTSTY MODUL RAD TX DLVR CPLX: CPT | Performed by: RADIOLOGY

## 2019-05-12 RX ORDER — SODIUM CHLORIDE 0.9 % (FLUSH) 0.9 %
10 SYRINGE (ML) INJECTION PRN
Status: CANCELLED | OUTPATIENT
Start: 2019-05-13

## 2019-05-12 RX ORDER — METHYLPREDNISOLONE SODIUM SUCCINATE 125 MG/2ML
125 INJECTION, POWDER, LYOPHILIZED, FOR SOLUTION INTRAMUSCULAR; INTRAVENOUS ONCE
Status: CANCELLED | OUTPATIENT
Start: 2019-05-13

## 2019-05-12 RX ORDER — DIPHENHYDRAMINE HYDROCHLORIDE 50 MG/ML
50 INJECTION INTRAMUSCULAR; INTRAVENOUS ONCE
Status: CANCELLED | OUTPATIENT
Start: 2019-05-13

## 2019-05-12 RX ORDER — SODIUM CHLORIDE 9 MG/ML
INJECTION, SOLUTION INTRAVENOUS CONTINUOUS
Status: CANCELLED | OUTPATIENT
Start: 2019-05-13

## 2019-05-12 RX ORDER — HEPARIN SODIUM (PORCINE) LOCK FLUSH IV SOLN 100 UNIT/ML 100 UNIT/ML
500 SOLUTION INTRAVENOUS PRN
Status: CANCELLED | OUTPATIENT
Start: 2019-05-13

## 2019-05-12 RX ORDER — SODIUM CHLORIDE 9 MG/ML
INJECTION, SOLUTION INTRAVENOUS ONCE
Status: CANCELLED | OUTPATIENT
Start: 2019-05-13

## 2019-05-12 RX ORDER — PALONOSETRON 0.05 MG/ML
0.25 INJECTION, SOLUTION INTRAVENOUS ONCE
Status: CANCELLED | OUTPATIENT
Start: 2019-05-13

## 2019-05-12 RX ORDER — SODIUM CHLORIDE 0.9 % (FLUSH) 0.9 %
5 SYRINGE (ML) INJECTION PRN
Status: CANCELLED | OUTPATIENT
Start: 2019-05-13

## 2019-05-12 RX ORDER — 0.9 % SODIUM CHLORIDE 0.9 %
10 VIAL (ML) INJECTION ONCE
Status: CANCELLED | OUTPATIENT
Start: 2019-05-13

## 2019-05-12 ASSESSMENT — ENCOUNTER SYMPTOMS
WHEEZING: 0
SHORTNESS OF BREATH: 0
RECTAL PAIN: 0
ABDOMINAL PAIN: 0
NAUSEA: 0
BLOOD IN STOOL: 0
TROUBLE SWALLOWING: 0
COUGH: 0
BACK PAIN: 0
EYE DISCHARGE: 0
CHEST TIGHTNESS: 0
SORE THROAT: 0
ABDOMINAL DISTENTION: 0
VOMITING: 0
CONSTIPATION: 0
FACIAL SWELLING: 0
DIARRHEA: 0
COLOR CHANGE: 0

## 2019-05-12 NOTE — PROGRESS NOTES
[facility] [address]  [address] [address]  Dept: [phone]  Dept Fax: [phone]: [phone]    Visit Date:5/13/2019     Clint Carroll is a 68 y.o. male who presents today for:   Chief Complaint   Patient presents with    Follow-up     Bladder Cancer        HPI:   This is a 77-year-old man with muscle invasive bladder cancer. He presents to the medical oncology clinic to discuss neoadjuvant chemotherapy. Patient developed gross hematuria and flank pain end of December 2018. He had CT urogram on December 13, 2018 that showed irregular thickening of the right posterior lateral bladder wall. Findings were highly suspicious for the presence of malignancy. On January 28, 2019 the patient underwent cystoscopy by Dr. Ewa Mora. It showed bladder trabeculations and bladder stone, on the right side there was a diverticulum with a bladder stone and bladder tumor inside. On January 30, 2019 the patient underwent TURBT. Final pathology report showed invasive high-grade urothelial carcinoma, clear cell variant. Deep smooth muscle was involved by carcinoma. Dr. Ewa Mora recommended neoadjuvant chemotherapy before bladder resection. However, the patient was absolutely opposed to having a bladder surgery. He agreed to concurrent chemoradiation with understanding that outcome is inferior to neoadjuvant chemo and surgery. Interval history on 05/13/2019:  The patient presents to the medical oncology clinic for 7th and last infusion of weekly cisplatin. His cycle#5 and 6 were given with dose reduction of cisplatin from 35 mg/m² to 30 mg/m². Overall he tolerated much better. Patient will complete radiation treatment in 2 days. He feels a little bit more tired, but he denies having any nausea, no vomiting. He denies having any peripheral neuropathy, no hearing problems. He denies hematuria. Denies having any fevers, no flank pain.   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     SINCE CHILDHOOD    Thyroid disease     Type 2 diabetes mellitus without complication (HCC)       Past Surgical History:   Procedure Laterality Date    CYSTOSCOPY N/A 1/30/2019    TRANSURETHRAL RESECTION BLADDER TUMOR, CYSTOLITHOLAPAXY performed by Jeff Finnegan MD at [address] [place] [address]        Family History   Problem Relation Age of Onset    Other Mother         BRAIN TUMOR    Heart Disease Father       Social History     Tobacco Use    Smoking status: Current Every Day Smoker     Packs/day: 1.50     Years: 53.00     Pack years: 79.50     Types: Cigarettes    Smokeless tobacco: Never Used    Tobacco comment: depending on tumor analysis   Substance Use Topics    Alcohol use: No     Comment: not in 40 years      Current Outpatient Medications   Medication Sig Dispense Refill    linagliptin (TRADJENTA) 5 MG tablet Take 5 mg by mouth daily      Multiple Vitamins-Minerals ([employer] SPECTRAVITE ADULT 50+ PO) Take by mouth      Saw [place] 450 MG CAPS Take by mouth daily Indications: 3-4 times per week PRN       atorvastatin (LIPITOR) 40 MG tablet Take 40 mg by mouth daily      metFORMIN (GLUCOPHAGE) 500 MG tablet Take 1,000 mg by mouth 2 times daily (with meals)       levothyroxine (SYNTHROID) 125 MCG tablet Take 125 mcg by mouth Daily      prednisoLONE acetate (PRED FORTE) 1 % ophthalmic suspension 1 drop 4 times daily      timolol (BETIMOL) 0.5 % ophthalmic solution 1 drop 2 times daily      brimonidine (ALPHAGAN) 0.2 % ophthalmic solution 1 drop 3 times daily      cyclopentolate (CYCLOGYL) 1 % ophthalmic solution 1 drop once      diclofenac (VOLTAREN) 50 MG EC tablet Take 1 tablet by mouth 2 times daily 60 tablet 3     No current facility-administered medications for this visit. Facility-Administered Medications Ordered in Other Visits   Medication Dose Route Frequency Provider Last Rate Last Dose    0.9 % sodium chloride infusion   Intravenous Once Aries Rivas MD 20 mL/hr at 05/13/19 1150      potassium chloride 10 mEq, magnesium sulfate 1 g in sodium chloride 0.9 % 500 mL IVPB   Intravenous Once Aries Rivas MD          No Known Allergies   Health Maintenance   Topic Date Due    Hepatitis C screen  1945    DTaP/Tdap/Td vaccine (1 - Tdap) 12/16/1964    Lipid screen  12/16/1985    Shingles Vaccine (1 of 2) 12/16/1995    Colon cancer screen colonoscopy  12/16/1995    Pneumococcal 65+ years Vaccine (1 of 2 - PCV13) 12/16/2010    Flu vaccine (Season Ended) 09/01/2019    Low dose CT lung screening  01/04/2020    AAA screen  Completed        Subjective:   Review of Systems   Constitutional: Negative for activity change, appetite change, fatigue and fever. HENT: Negative for congestion, dental problem, facial swelling, hearing loss, mouth sores, nosebleeds, sore throat, tinnitus and trouble swallowing. Eyes: Negative for discharge and visual disturbance. Respiratory: Negative for cough, chest tightness, shortness of breath and wheezing. Cardiovascular: Negative for chest pain, palpitations and leg swelling. Gastrointestinal: Negative for abdominal distention, abdominal pain, blood in stool, constipation, diarrhea, nausea, rectal pain and vomiting. Endocrine: Negative for cold intolerance, polydipsia and polyuria. Genitourinary: Negative for decreased urine volume, difficulty urinating, dysuria, flank pain, hematuria and urgency. Musculoskeletal: Negative for arthralgias, back pain, gait problem, joint swelling, myalgias and neck stiffness. Skin: Negative for color change, rash and wound.    Neurological: Negative for dizziness, tremors, seizures, speech difficulty, weakness, low signal intensity on the T1 and T2-weighted          Assessment:        Diagnosis Orders   1. Urothelial carcinoma of bladder ([address]     2. Encounter for chemotherapy management          Plan:   1. 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   I discuss with the patient the benefit of neoadjuvant chemotherapy, the schedule and possible side effects of treatment. Despite radical cystectomy, approximately one-half of patients with muscle-invasive urothelial bladder cancer involving the muscularis propria will develop metastatic disease within two years. Therefore the preferred management of patients with muscle-invasive bladder cancer consists of a multimodal approach comprising neoadjuvant chemotherapy followed by radical cystectomy. This approach is supported by  evidence that neoadjuvant cisplatin-based chemotherapy improves survival compared with locoregional treatment alone. A 2003 meta-analysis of 11 randomized trials that compared cisplatin-based neoadjuvant chemotherapy plus local therapy versus local therapy alone showed an improvement in overall survival (5-year OS 50 versus 45%) and a lower risk of recurrence (HR for recurrence 0.81). Choice of chemotherapy regimens include MVAC for healthy patients younger than 79years of age. In older patients and those unable to tolerate this combination due to medical comorbidities, gemcitabine plus cisplatin (GC) is a reasonable alternative. The patient is quite functional, without contraindications for cisplatin, therefore he should be able to tolerate the gemcitabine and cisplatin. He had MRI of the pelvis that showed a diverticulum extending from the posterior margin of the right lateral wall the urinary bladder with an associated enhancing mass lesion which measures 3.6 x 3.1 x 3.9 cm in size. The patient refused to have surgery. During original visit we discussed the alternative options.   I explained to him that

## 2019-05-13 ENCOUNTER — HOSPITAL ENCOUNTER (OUTPATIENT)
Dept: INFUSION THERAPY | Age: 74
Discharge: HOME | End: 2019-05-13
Payer: MEDICARE

## 2019-05-13 ENCOUNTER — OFFICE VISIT (OUTPATIENT)
Dept: ONCOLOGY | Age: 74
End: 2019-05-13
Payer: MEDICARE

## 2019-05-13 VITALS
RESPIRATION RATE: 16 BRPM | TEMPERATURE: 98.2 F | OXYGEN SATURATION: 97 % | HEART RATE: 76 BPM | DIASTOLIC BLOOD PRESSURE: 76 MMHG | WEIGHT: 193 LBS | HEIGHT: 67 IN | SYSTOLIC BLOOD PRESSURE: 116 MMHG | BODY MASS INDEX: 30.29 KG/M2

## 2019-05-13 VITALS
HEIGHT: 67 IN | TEMPERATURE: 97.9 F | SYSTOLIC BLOOD PRESSURE: 141 MMHG | WEIGHT: 193 LBS | HEART RATE: 71 BPM | DIASTOLIC BLOOD PRESSURE: 67 MMHG | OXYGEN SATURATION: 92 % | RESPIRATION RATE: 18 BRPM | BODY MASS INDEX: 30.29 KG/M2

## 2019-05-13 DIAGNOSIS — Z51.11 ENCOUNTER FOR CHEMOTHERAPY MANAGEMENT: ICD-10-CM

## 2019-05-13 DIAGNOSIS — C67.8 MALIGNANT NEOPLASM OF OVERLAPPING SITES OF BLADDER (HCC): ICD-10-CM

## 2019-05-13 DIAGNOSIS — C67.9 UROTHELIAL CARCINOMA OF BLADDER (HCC): Primary | ICD-10-CM

## 2019-05-13 LAB
BUN, WHOLE BLOOD: 11 MG/DL (ref 8–26)
CHLORIDE, WHOLE BLOOD: 99 MEQ/L (ref 98–109)
CREATININE, WHOLE BLOOD: 0.7 MG/DL (ref 0.5–1.2)
GFR, ESTIMATED: > 90 ML/MIN/1.73M2
GLUCOSE, WHOLE BLOOD: 154 MG/DL (ref 70–108)
HCT VFR BLD CALC: 35 % (ref 42–52)
HEMOGLOBIN: 12.1 GM/DL (ref 14–18)
IONIZED CALCIUM, WHOLE BLOOD: 1.2 MMOL/L (ref 1.12–1.32)
MCH RBC QN AUTO: 31 PG (ref 27–31)
MCHC RBC AUTO-ENTMCNC: 34.7 GM/DL (ref 33–37)
MCV RBC AUTO: 89 FL (ref 80–94)
PDW BLD-RTO: 12.5 % (ref 11.5–14.5)
PLATELET # BLD: 157 THOU/MM3 (ref 130–400)
PMV BLD AUTO: 6.9 FL (ref 7.4–10.4)
POTASSIUM, WHOLE BLOOD: 4 MEQ/L (ref 3.5–4.9)
RBC # BLD: 3.92 MILL/MM3 (ref 4.7–6.1)
SEG NEUTROPHILS: 74 % (ref 43–75)
SEGMENTED NEUTROPHILS ABSOLUTE COUNT: 4.3 THOU/MM3 (ref 1.8–7.7)
SODIUM, WHOLE BLOOD: 139 MEQ/L (ref 138–146)
TOTAL CO2, WHOLE BLOOD: 28 MEQ/L (ref 23–33)
WBC # BLD: 5.8 THOU/MM3 (ref 4.8–10.8)

## 2019-05-13 PROCEDURE — 80047 BASIC METABLC PNL IONIZED CA: CPT

## 2019-05-13 PROCEDURE — 3017F COLORECTAL CA SCREEN DOC REV: CPT | Performed by: INTERNAL MEDICINE

## 2019-05-13 PROCEDURE — 36415 COLL VENOUS BLD VENIPUNCTURE: CPT

## 2019-05-13 PROCEDURE — G8427 DOCREV CUR MEDS BY ELIG CLIN: HCPCS | Performed by: INTERNAL MEDICINE

## 2019-05-13 PROCEDURE — G8417 CALC BMI ABV UP PARAM F/U: HCPCS | Performed by: INTERNAL MEDICINE

## 2019-05-13 PROCEDURE — 96413 CHEMO IV INFUSION 1 HR: CPT

## 2019-05-13 PROCEDURE — 96366 THER/PROPH/DIAG IV INF ADDON: CPT

## 2019-05-13 PROCEDURE — 2580000003 HC RX 258: Performed by: INTERNAL MEDICINE

## 2019-05-13 PROCEDURE — 96375 TX/PRO/DX INJ NEW DRUG ADDON: CPT

## 2019-05-13 PROCEDURE — 77386 HC NTSTY MODUL RAD TX DLVR CPLX: CPT | Performed by: RADIOLOGY

## 2019-05-13 PROCEDURE — 6360000002 HC RX W HCPCS: Performed by: INTERNAL MEDICINE

## 2019-05-13 PROCEDURE — 99214 OFFICE O/P EST MOD 30 MIN: CPT | Performed by: INTERNAL MEDICINE

## 2019-05-13 PROCEDURE — 96367 TX/PROPH/DG ADDL SEQ IV INF: CPT

## 2019-05-13 PROCEDURE — 4040F PNEUMOC VAC/ADMIN/RCVD: CPT | Performed by: INTERNAL MEDICINE

## 2019-05-13 PROCEDURE — 4004F PT TOBACCO SCREEN RCVD TLK: CPT | Performed by: INTERNAL MEDICINE

## 2019-05-13 PROCEDURE — 2709999900 HC NON-CHARGEABLE SUPPLY

## 2019-05-13 PROCEDURE — G0463 HOSPITAL OUTPT CLINIC VISIT: HCPCS

## 2019-05-13 PROCEDURE — 96415 CHEMO IV INFUSION ADDL HR: CPT

## 2019-05-13 PROCEDURE — 85027 COMPLETE CBC AUTOMATED: CPT

## 2019-05-13 PROCEDURE — 1123F ACP DISCUSS/DSCN MKR DOCD: CPT | Performed by: INTERNAL MEDICINE

## 2019-05-13 PROCEDURE — 77336 RADIATION PHYSICS CONSULT: CPT | Performed by: RADIOLOGY

## 2019-05-13 RX ORDER — SODIUM CHLORIDE 9 MG/ML
INJECTION, SOLUTION INTRAVENOUS ONCE
Status: COMPLETED | OUTPATIENT
Start: 2019-05-13 | End: 2019-05-13

## 2019-05-13 RX ORDER — PALONOSETRON 0.05 MG/ML
0.25 INJECTION, SOLUTION INTRAVENOUS ONCE
Status: COMPLETED | OUTPATIENT
Start: 2019-05-13 | End: 2019-05-13

## 2019-05-13 RX ADMIN — POTASSIUM CHLORIDE: 2 INJECTION, SOLUTION, CONCENTRATE INTRAVENOUS at 12:05

## 2019-05-13 RX ADMIN — MANNITOL: 250 INJECTION, SOLUTION INTRAVENOUS at 13:57

## 2019-05-13 RX ADMIN — POTASSIUM CHLORIDE: 2 INJECTION, SOLUTION, CONCENTRATE INTRAVENOUS at 16:10

## 2019-05-13 RX ADMIN — DEXAMETHASONE SODIUM PHOSPHATE 12 MG: 4 INJECTION, SOLUTION INTRA-ARTICULAR; INTRALESIONAL; INTRAMUSCULAR; INTRAVENOUS; SOFT TISSUE at 13:08

## 2019-05-13 RX ADMIN — PALONOSETRON 0.25 MG: 0.05 INJECTION, SOLUTION INTRAVENOUS at 12:00

## 2019-05-13 RX ADMIN — SODIUM CHLORIDE: 9 INJECTION, SOLUTION INTRAVENOUS at 11:50

## 2019-05-13 RX ADMIN — SODIUM CHLORIDE 150 MG: 9 INJECTION, SOLUTION INTRAVENOUS at 13:26

## 2019-05-13 NOTE — PROGRESS NOTES
Patient assessed for the following post chemotherapy:    Dizziness   No  Lightheadedness  No      Acute nausea/vomiting No  Headache   No  Chest pain/pressure  No  Rash/itching   No  Shortness of breath  No    Patient kept for 20 minutes observation post infusion chemotherapy. Patient tolerated chemotherapy treatment Cisplatin without any complications. Last vital signs:   BP (!) 141/67   Pulse 71   Temp 97.9 °F (36.6 °C) (Oral)   Resp 18   Ht 5' 7\" (1.702 m)   Wt 193 lb (87.5 kg)   SpO2 92%   BMI 30.23 kg/m²     Patient instructed if experience any of the above symptoms following today's infusion, he is to notify MD immediately or go to the emergency department. Discharge instructions given to patient. Verbalizes understanding. Ambulated off unit per self, with belongings.

## 2019-05-13 NOTE — PROGRESS NOTES
Ambulated off the unit with medication running through IV, escorted by Ivett Bae RN, to radiation appointment scheduled for 3 pm, per self.

## 2019-05-13 NOTE — ONCOLOGY
Chemotherapy Administration    Pre-assessment Data: Antineoplastic Agents  Other:   See toxicity flow sheet for assessment [x]     Physician Notification of Concerns Related to Chemotherapy Administration:   Physician Notified Fredia Tylerton / Time of Notification      Interventions:   Lab work assessed  [x]   Height / Weight verified for dose [x]   Current MAR reviewed [x]   Emergency drugs available as appropriate [x]   Anaphylaxis assessment completed [x]   Pre-medications administered as ordered [x]   Blood return noted upon initiation of chemotherapy [x]   Blood return noted each 1-2ml of a vesicant medication if given IV push []   Blood return noted each 2-3ml of a non-vesicant medication if given IV push []   Monitor for signs / symptoms of hypersensitivity reaction [x]   Chemotherapy orders (drug/dose/rate) verified by 2 Chemo certified RNs [x]   Monitor IV site and blood return throughout the infusion of the medication [x]   Document IV site checks on the IV assessment form [x]   Document chemotherapy teaching on the Patient Education tab [x]   Document patient verbalizes understanding of medications being administered [x]   If IV infiltration, see ONS Guidelines []   Other:     Cisplatin []

## 2019-05-13 NOTE — PLAN OF CARE
Problem: Intellectual/Education/Knowledge Deficit  Goal: Teaching initiated upon admission  Outcome: Met This Shift  Note:   Patient verbalizes understanding to verbal information given on Cisplatin ,action and possible side effects. Aware to call MD if develop complications. Intervention: Verbal/written education provided  Note:   Chemotherapy Teaching     What is Chemotherapy   Drug action ? Method of Administration ? Handouts given ? Side Effects  Nausea/vomiting ? Diarrhea ? Fatigue ? Signs / Symptoms of infection ? Neutropenia ? Thrombocytopenia ? Alopecia ? neuropathy ? Ogle diet &  the importance of fluids ? Micellaneous  Importance of nutrition ? Importance of oral hygiene ? When to call the MD ?   Monitoring labs ? Use of supportive services ? Explanation of Drug Regimen / Frequency  Cisplatin:  D1C7     Comments  Verbalized understanding to drug,action,side effects and when to call MD         Problem: Discharge Planning  Goal: Knowledge of discharge instructions  Description  Knowledge of discharge instructions     Outcome: Met This Shift  Note:   Verbalized understanding of discharge instructions, follow-up appointments, and when to call the physician. Intervention: Discharge to appropriate level of care  Note:   Discuss understanding of discharge instructions,follow-up appointments, and when to call the physician. Care plan reviewed with patient. Patient verbalize understanding of the plan of care and contribute to goal setting.

## 2019-05-13 NOTE — PATIENT INSTRUCTIONS
1. Proceed with the last weekly infusion of cisplatin today  2.   RTC in 3 weeks to see me, for labs: CBC, BMP, LFTs

## 2019-05-15 PROCEDURE — 77386 HC NTSTY MODUL RAD TX DLVR CPLX: CPT | Performed by: RADIOLOGY

## 2019-05-16 PROCEDURE — 77386 HC NTSTY MODUL RAD TX DLVR CPLX: CPT | Performed by: RADIOLOGY

## 2019-05-28 ENCOUNTER — CLINICAL DOCUMENTATION (OUTPATIENT)
Dept: RADIATION ONCOLOGY | Age: 74
End: 2019-05-28

## 2019-05-28 NOTE — PROGRESS NOTES
[facility] [address] [facility], [address]  Ph. [phone]  Fax [phone]      PATIENT: Car Anaya  : 1945  MRN: [id number]  DATE: 19      DIAGNOSIS: C67.8 -- Malignant neoplasm of the urinary bladder, high-grade urothelial carcinoma, clear-cell type; cT2b N0 M0, Stage II (MRI scan is suggestive of subserosal extension, and worrisome for interstitial spread in perivesicle tissue). Patient wishes to attempt bladder sparing. Treatment Course Number: 1    Treatment Site (s) Modality Dose (cGy From To Fractions/  Elapsed Days   Whole Bladder + Pelvic Nodes 6MV photons 2520 cGy 3/18/2019 2019 14/ 21   Boost to Bladder Tumor + Perivesicle Tissue 6MV photons 3960 cGy 2019     Cumulative Dose to Bladder Mass: 6480 cGy  Treatment Modifiers: Intensity Modulated Radiation Therapy; Custom MLC Blocking; Custom Vac-Fix Immobilization; Concurrent Chemotherapy; Daily Cone Beam CT Image Guidance. HISTORY OF PRESENT ILLNESS:   Mr. Allison Carney is a very nice 66-year-old gentleman who developed flank pain and had an episode of gross hematuria in late . CT urogram performed in 2018 showed some thickening of the right posterior lateral bladder wall in addition to the presence of bladder calculus. He underwent evaluation in urology, and in 2019 underwent cystoscopy. This revealed a diverticulum involving the right side of the bladder with a calculus in the diverticulum and also within the bladder proper. There was evidence of malignancy on the right side, involving the trigone and distal right bladder. The tumor obscured/covered over the opening of the diverticulum and also extended into the diverticulum. TURBT was performed in late 2019. Final pathology showed high-grade invasive urothelial carcinoma.   The cancer was an unusual variant, clear-cell type, and there was associated high-grade carcinoma in situ. During his urology follow-up, Mr. Fer Nails received a recommendation for neoadjuvant chemotherapy followed by cystectomy, which is the treatment of choice for invasive latter cancers. He was seen by Dr. Rajesh Moran in hematology/oncology, and having done some additional research into his options, he indicated to her that he wishes to attempt bladder preservation with combined chemotherapy and radiation therapy. Mr. Fer Nails was seen today 2019 for evaluation and discussion regarding the potential role of radiation therapy in the management of his bladder cancer. He was offered attempted organ preservation, but cautioned at length that his cancer has unusual histology with only minimal data available in the medical literature, and no reported cases of organ preservation in the context of this histology. He also was cautioned that even with more typical urothelial carcinomas, organ preservation is not uniformly successful, and a significant percentage of individuals will require cystectomy. He indicated that he understood the discussion, including the cautioned area comments, and wished to proceed with attempted organ preservation. Pain Ratin/10  ECOG Performance Status: 1      Treatment Tolerance/Response:   Mr. Fer Nails tolerated his radiation therapy fairly well. He developed increased fatigue, and some mild crampy discomfort in the low pelvis. His main issues during treatment had to do with concern regarding his radiation therapy prescription, and with his chemotherapy. He developed cytopenias which required brief treatment interruption. His counts recovered, but he did require a dose adjustment for his chemotherapy.   The initial treatment prescription was to deliver 4500 cGy to the whole bladder, area of concern for perivesicle extension and pelvic lymph nodes, followed by a cone down boost to the grossly visible bladder tumor and area of concern for perivesicle extension. However, Mr. Fidelia Abbasi was convinced that treatment to the larger area including potential subclinical sites somehow detracted from the treatment to the primary tumor, though I attempted to explain that the cumulative dose to the bladder tumor was unaffected. Because of his concerns, the treatment prescription was adjusted. Treatment to the larger area encompassing bladder tumor and sites of potential subclinical spread was discontinued after 2520 cGy, and the remainder of the treatment was delivered to the smaller area of known tumor plus suspected perivesicle spread, which received the initially planned cumulative dose of 6480 cGy. Towards the latter part of treatment, there was some evidence of tumor response seen on the cone beam CT imaging. Mr. Fidelia Abbasi did not have any unexpected side effects during the treatment course. Systemic Therapy: Concurrent Cisplatin      Follow Up Plan:   Tabatha Solis received post-treatment instructions. He is scheduled to return for a checkup in June 2019. He should undergo post treatment imaging and cystoscopy after allowing for some healing time. He expressed some reluctance regarding cystoscopy, but I explained that imaging alone cannot adequately show whether he has had a complete response. I would recommend waiting about 6 weeks before cystoscopy. As usual, Mr. Beyer was instructed to call and arrange for an earlier appointment if he has any problems, questions or concerns. He should continue care in urology, hematology/oncology, and with his other physicians/providers. Electronically signed by Jazmyne Beatty MD   Radiation Oncology      CC: Bipin Ramos. YOLANDA Elliott, WILLIAN-CNP  ACC: [facility]       This document was created using a voice-recognition program.  Computer generated transcription errors may be present.

## 2019-05-31 DIAGNOSIS — C67.9 UROTHELIAL CARCINOMA OF BLADDER (HCC): Primary | ICD-10-CM

## 2019-06-03 ENCOUNTER — OFFICE VISIT (OUTPATIENT)
Dept: ONCOLOGY | Age: 74
End: 2019-06-03
Payer: MEDICARE

## 2019-06-03 ENCOUNTER — HOSPITAL ENCOUNTER (OUTPATIENT)
Dept: INFUSION THERAPY | Age: 74
Discharge: HOME | End: 2019-06-03
Payer: MEDICARE

## 2019-06-03 VITALS
HEIGHT: 67 IN | TEMPERATURE: 98.5 F | HEART RATE: 90 BPM | RESPIRATION RATE: 18 BRPM | BODY MASS INDEX: 29.63 KG/M2 | SYSTOLIC BLOOD PRESSURE: 116 MMHG | WEIGHT: 188.8 LBS | OXYGEN SATURATION: 98 % | DIASTOLIC BLOOD PRESSURE: 62 MMHG

## 2019-06-03 DIAGNOSIS — C67.9 UROTHELIAL CARCINOMA OF BLADDER (HCC): Primary | ICD-10-CM

## 2019-06-03 DIAGNOSIS — Z92.21 STATUS POST CHEMOTHERAPY, TIME SINCE LESS THAN 4 WEEKS: ICD-10-CM

## 2019-06-03 DIAGNOSIS — R31.0 GROSS HEMATURIA: ICD-10-CM

## 2019-06-03 DIAGNOSIS — C67.9 UROTHELIAL CARCINOMA OF BLADDER (HCC): ICD-10-CM

## 2019-06-03 LAB
ALBUMIN SERPL-MCNC: 4 G/DL (ref 3.5–5.1)
ALP BLD-CCNC: 124 U/L (ref 38–126)
ALT SERPL-CCNC: 11 U/L (ref 11–66)
AST SERPL-CCNC: 14 U/L (ref 5–40)
BASINOPHIL, AUTOMATED: 0 % (ref 0–3)
BILIRUB SERPL-MCNC: 0.5 MG/DL (ref 0.3–1.2)
BILIRUBIN DIRECT: < 0.2 MG/DL (ref 0–0.3)
BUN, WHOLE BLOOD: 15 MG/DL (ref 8–26)
CHLORIDE, WHOLE BLOOD: 99 MEQ/L (ref 98–109)
CREATININE, WHOLE BLOOD: 0.7 MG/DL (ref 0.5–1.2)
EOSINOPHILS RELATIVE PERCENT: 3 % (ref 0–4)
GFR, ESTIMATED: > 90 ML/MIN/1.73M2
GLUCOSE, WHOLE BLOOD: 188 MG/DL (ref 70–108)
HCT VFR BLD CALC: 33.8 % (ref 42–52)
HEMOGLOBIN: 12 GM/DL (ref 14–18)
IONIZED CALCIUM, WHOLE BLOOD: 1.23 MMOL/L (ref 1.12–1.32)
LYMPHOCYTES # BLD: 30 % (ref 15–47)
MCH RBC QN AUTO: 33.3 PG (ref 27–31)
MCHC RBC AUTO-ENTMCNC: 35.6 GM/DL (ref 33–37)
MCV RBC AUTO: 94 FL (ref 80–94)
MONOCYTES: 9 % (ref 0–12)
PDW BLD-RTO: 16 % (ref 11.5–14.5)
PLATELET # BLD: 112 THOU/MM3 (ref 130–400)
PMV BLD AUTO: 7.2 FL (ref 7.4–10.4)
POTASSIUM, WHOLE BLOOD: 4.7 MEQ/L (ref 3.5–4.9)
RBC # BLD: 3.61 MILL/MM3 (ref 4.7–6.1)
SEG NEUTROPHILS: 58 % (ref 43–75)
SODIUM, WHOLE BLOOD: 138 MEQ/L (ref 138–146)
TOTAL CO2, WHOLE BLOOD: 26 MEQ/L (ref 23–33)
TOTAL PROTEIN: 6.4 G/DL (ref 6.1–8)
WBC # BLD: 3.3 THOU/MM3 (ref 4.8–10.8)

## 2019-06-03 PROCEDURE — 36415 COLL VENOUS BLD VENIPUNCTURE: CPT

## 2019-06-03 PROCEDURE — 99211 OFF/OP EST MAY X REQ PHY/QHP: CPT

## 2019-06-03 PROCEDURE — 1123F ACP DISCUSS/DSCN MKR DOCD: CPT | Performed by: INTERNAL MEDICINE

## 2019-06-03 PROCEDURE — G8417 CALC BMI ABV UP PARAM F/U: HCPCS | Performed by: INTERNAL MEDICINE

## 2019-06-03 PROCEDURE — 4040F PNEUMOC VAC/ADMIN/RCVD: CPT | Performed by: INTERNAL MEDICINE

## 2019-06-03 PROCEDURE — 80076 HEPATIC FUNCTION PANEL: CPT

## 2019-06-03 PROCEDURE — 3017F COLORECTAL CA SCREEN DOC REV: CPT | Performed by: INTERNAL MEDICINE

## 2019-06-03 PROCEDURE — 99213 OFFICE O/P EST LOW 20 MIN: CPT | Performed by: INTERNAL MEDICINE

## 2019-06-03 PROCEDURE — G8427 DOCREV CUR MEDS BY ELIG CLIN: HCPCS | Performed by: INTERNAL MEDICINE

## 2019-06-03 PROCEDURE — 4004F PT TOBACCO SCREEN RCVD TLK: CPT | Performed by: INTERNAL MEDICINE

## 2019-06-03 PROCEDURE — 85025 COMPLETE CBC W/AUTO DIFF WBC: CPT

## 2019-06-03 PROCEDURE — 80047 BASIC METABLC PNL IONIZED CA: CPT

## 2019-06-03 ASSESSMENT — ENCOUNTER SYMPTOMS
VOMITING: 0
SORE THROAT: 0
ABDOMINAL DISTENTION: 0
RECTAL PAIN: 0
ABDOMINAL PAIN: 0
SHORTNESS OF BREATH: 0
EYE DISCHARGE: 0
NAUSEA: 0
WHEEZING: 0
TROUBLE SWALLOWING: 0
CONSTIPATION: 0
DIARRHEA: 0
CHEST TIGHTNESS: 0
FACIAL SWELLING: 0
BACK PAIN: 0
COLOR CHANGE: 0
COUGH: 0
BLOOD IN STOOL: 0

## 2019-06-03 NOTE — PROGRESS NOTES
[facility], [address] [facility],[address]  [address] [address]  Dept: [phone]  Dept Fax: [phone]: [phone]    Visit Date:6/3/2019     Delmi Lord is a 68 y.o. male who presents today for:   Chief Complaint   Patient presents with    Follow-up     BLADDER CA        HPI:   This is a 59-year-old man with muscle invasive bladder cancer. He presents to the medical oncology clinic to discuss neoadjuvant chemotherapy. Patient developed gross hematuria and flank pain end of December 2018. He had CT urogram on December 13, 2018 that showed irregular thickening of the right posterior lateral bladder wall. Findings were highly suspicious for the presence of malignancy. On January 28, 2019 the patient underwent cystoscopy by Dr. Dolores Jc. It showed bladder trabeculations and bladder stone, on the right side there was a diverticulum with a bladder stone and bladder tumor inside. On January 30, 2019 the patient underwent TURBT. Final pathology report showed invasive high-grade urothelial carcinoma, clear cell variant. Deep smooth muscle was involved by carcinoma. Dr. Dolores Jc recommended neoadjuvant chemotherapy before bladder resection. However, the patient was absolutely opposed to having a bladder surgery. He agreed to concurrent chemoradiation with understanding that outcome is inferior to neoadjuvant chemo and surgery. Interval history on 06/03/2019:  The patient presents to the medical oncology clinic for follow up [id number] and last infusion of weekly cisplatin. His cycle#5 and 6 were given with dose reduction of cisplatin from 35 mg/m² to 30 mg/m². Overall he tolerated much better. Patient complete radiation treatment 2 weeks ago. Jeniffer Never He feels a little bit more tired, but he denies having any nausea, no vomiting. He denies having any peripheral neuropathy, no hearing problems. He denies hematuria. Denies having any fevers, no flank pain.   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     SINCE CHILDHOOD    Thyroid disease     Type 2 diabetes mellitus without complication (HCC)       Past Surgical History:   Procedure Laterality Date    CYSTOSCOPY N/A 1/30/2019    TRANSURETHRAL RESECTION BLADDER TUMOR, CYSTOLITHOLAPAXY performed by Mary Kay Chan MD at [address] [place] [address]        Family History   Problem Relation Age of Onset    Other Mother         BRAIN TUMOR    Heart Disease Father       Social History     Tobacco Use    Smoking status: Current Every Day Smoker     Packs/day: 1.50     Years: 53.00     Pack years: 79.50     Types: Cigarettes    Smokeless tobacco: Never Used    Tobacco comment: depending on tumor analysis   Substance Use Topics    Alcohol use: No     Comment: not in 40 years      Current Outpatient Medications   Medication Sig Dispense Refill    diclofenac (VOLTAREN) 50 MG EC tablet Take 1 tablet by mouth 2 times daily 60 tablet 3    linagliptin (TRADJENTA) 5 MG tablet Take 5 mg by mouth daily      Multiple Vitamins-Minerals (CVS SPECTRAVITE ADULT 50+ PO) Take by mouth      Saw [place] 450 MG CAPS Take by mouth daily Indications: 3-4 times per week PRN       atorvastatin (LIPITOR) 40 MG tablet Take 40 mg by mouth daily      metFORMIN (GLUCOPHAGE) 500 MG tablet Take 1,000 mg by mouth 2 times daily (with meals)       levothyroxine (SYNTHROID) 125 MCG tablet Take 125 mcg by mouth Daily      prednisoLONE acetate (PRED FORTE) 1 % ophthalmic suspension 1 drop 4 times daily      timolol (BETIMOL) 0.5 % ophthalmic solution 1 drop 2 times daily      brimonidine (ALPHAGAN) 0.2 % ophthalmic solution 1 drop 3 times daily      cyclopentolate (CYCLOGYL) 1 % ophthalmic solution 1 drop once       No current facility-administered medications for this visit. No Known Allergies   Health Maintenance   Topic Date Due    Hepatitis C screen  1945    DTaP/Tdap/Td vaccine (1 - Tdap) 12/16/1964    Lipid screen  12/16/1985    Shingles Vaccine (1 of 2) 12/16/1995    Colon cancer screen colonoscopy  12/16/1995    Annual Wellness Visit (AWV)  12/16/2008    Pneumococcal 65+ years Vaccine (1 of 2 - PCV13) 12/16/2010    Flu vaccine (Season Ended) 09/01/2019    Low dose CT lung screening  01/04/2020    AAA screen  Completed        Subjective:   Review of Systems   Constitutional: Negative for activity change, appetite change, fatigue and fever. HENT: Negative for congestion, dental problem, facial swelling, hearing loss, mouth sores, nosebleeds, sore throat, tinnitus and trouble swallowing. Eyes: Negative for discharge and visual disturbance. Respiratory: Negative for cough, chest tightness, shortness of breath and wheezing. Cardiovascular: Negative for chest pain, palpitations and leg swelling. Gastrointestinal: Negative for abdominal distention, abdominal pain, blood in stool, constipation, diarrhea, nausea, rectal pain and vomiting. Endocrine: Negative for cold intolerance, polydipsia and polyuria. Genitourinary: Negative for decreased urine volume, difficulty urinating, dysuria, flank pain, hematuria and urgency. Musculoskeletal: Negative for arthralgias, back pain, gait problem, joint swelling, myalgias and neck stiffness. Skin: Negative for color change, rash and wound. Neurological: Negative for dizziness, tremors, seizures, speech difficulty, weakness, light-headedness, numbness and headaches. Hematological: Negative for adenopathy. Does not bruise/bleed easily. Psychiatric/Behavioral: Negative for confusion and sleep disturbance. The patient is not nervous/anxious.          Objective:   Physical Exam   Constitutional: He is oriented to person, place, and time. He appears well-developed and well-nourished. No distress. HENT:   Head: Normocephalic. Mouth/Throat: Oropharynx is clear and moist. No oropharyngeal exudate. Eyes: Pupils are equal, round, and reactive to light. EOM are normal. Right eye exhibits no discharge. Left eye exhibits no discharge. No scleral icterus. Neck: Normal range of motion. Neck supple. No JVD present. No tracheal deviation present. No thyromegaly present. Cardiovascular: Normal rate and normal heart sounds. Exam reveals no gallop and no friction rub. No murmur heard. Pulmonary/Chest: Effort normal and breath sounds normal. No stridor. No respiratory distress. He has no wheezes. He has no rales. He exhibits no tenderness. Abdominal: Soft. Bowel sounds are normal. He exhibits no distension and no mass. There is no tenderness. There is no rebound. Musculoskeletal: Normal range of motion. He exhibits no edema. Good range of motion in all four extremities. Lymphadenopathy:     He has no cervical adenopathy. Neurological: He is alert and oriented to person, place, and time. He has normal reflexes. No cranial nerve deficit. He exhibits normal muscle tone. Skin: Skin is warm. No rash noted. No erythema. Psychiatric: He has a normal mood and affect. His behavior is normal. Judgment and thought content normal.   Vitals reviewed. /62 (Site: Left Upper Arm, Position: Sitting, Cuff Size: Large Adult)   Pulse 90   Temp 98.5 °F (36.9 °C) (Oral)   Resp 18   Ht 5' 7.01\" (1.702 m)   Wt 188 lb 12.8 oz (85.6 kg)   SpO2 98%   BMI 29.56 kg/m²      ECOG status is 0. Imaging studies and labs:     CT of the abdomen on December 13, 2018 showed: There is a right posterior lateral bladder diverticulum which contains both enhancing soft tissue as well as a large calculus.  There is a 17 mm bladder calculus in addition there is irregular thickening of the right posterior lateral bladder    wall and these findings are highly suspicious for the presence of neoplasia         CT of the chest on January 4, 2019 showed:  1.  Bochdalek hernia with resultant intrathoracic stomach and large amount of mesenteric fat in the left hemithorax. 2. 5 mm noncalcified nodule right middle lobe. One-year follow-up recommended. Lab Results   Component Value Date    WBC 3.3 (L) 06/03/2019    HGB 12.0 (L) 06/03/2019    HCT 33.8 (L) 06/03/2019    MCV 94 06/03/2019     (L) 06/03/2019       Chemistry        Component Value Date/Time     06/03/2019 1226     01/31/2019 0814    K 4.7 06/03/2019 1226    K 4.2 01/31/2019 0814     01/31/2019 0814    CO2 22 (L) 01/31/2019 0814    BUN 9 01/31/2019 0814    CREATININE 0.7 06/03/2019 1226    CREATININE 0.6 04/08/2019 1015        Component Value Date/Time    CALCIUM 8.1 (L) 01/31/2019 0814    ALKPHOS 124 06/03/2019 1226    AST 14 06/03/2019 1226    ALT 11 06/03/2019 1226    BILITOT 0.5 06/03/2019 1226        MRI of the pelvis on March 5, 2019 showed:  1. There is a diverticulum extending from the posterior margin of the right lateral wall the urinary bladder with an associated enhancing mass lesion. 2. The diverticulum including the mass lesion measures 6.0 x 3.7 x 3.9 cm in longitudinal, transverse and AP dimensions. The diverticulum excluding the mass lesion measures 3.6 x 3.1 x 3.9 cm in longitudinal, transverse and AP dimensions. The    diverticular mass measures 4.3 x 3.3 x 3.9 cm.   3. The enhancing mass involves the distal margins of the lateral wall of the diverticulum and also the posterior margin of the lateral wall of the urinary bladder adjacent to this region. This mass is low signal intensity on the T1 and T2-weighted          Assessment:        Diagnosis Orders   1. Urothelial carcinoma of bladder ([address]  MRI Pelvis W WO Contrast   2. Gross hematuria     3. Status post chemotherapy, time since less than 4 weeks          Plan:   1.  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   I discuss with the patient the benefit of neoadjuvant chemotherapy, the schedule and possible side effects of treatment. Despite radical cystectomy, approximately one-half of patients with muscle-invasive urothelial bladder cancer involving the muscularis propria will develop metastatic disease within two years. Therefore the preferred management of patients with muscle-invasive bladder cancer consists of a multimodal approach comprising neoadjuvant chemotherapy followed by radical cystectomy. This approach is supported by  evidence that neoadjuvant cisplatin-based chemotherapy improves survival compared with locoregional treatment alone. A 2003 meta-analysis of 11 randomized trials that compared cisplatin-based neoadjuvant chemotherapy plus local therapy versus local therapy alone showed an improvement in overall survival (5-year OS 50 versus 45%) and a lower risk of recurrence (HR for recurrence 0.81). Choice of chemotherapy regimens include MVAC for healthy patients younger than 79years of age. In older patients and those unable to tolerate this combination due to medical comorbidities, gemcitabine plus cisplatin (GC) is a reasonable alternative. The patient is quite functional, without contraindications for cisplatin, therefore he should be able to tolerate the gemcitabine and cisplatin. He had MRI of the pelvis that showed a diverticulum extending from the posterior margin of the right lateral wall the urinary bladder with an associated enhancing mass lesion which measures 3.6 x 3.1 x 3.9 cm in size. The patient refused to have surgery. During original visit we discussed the alternative options. I explained to him that the only potentially curative option is surgery. For patients who are  medically inoperable due to comorbidity and frailty, chemoradiation may be a reasonable alternative.  Some elderly patients with invasive bladder cancer can be cured by cisplatin-based regimen and radiation therapy or at least effectively palliated for sustained periods measured in months to years. The patient understand that this is not the preferred and recommended approach, but he decided to proceed with this therapy. 2.  Concurrent chemoradiation. He started concurrent chemoradiation on March 18, 2019 and completed on May 15, 2019 He tolerated 7th infusion of cisplatin well. Cycle #5-7 were given with dose reduction of cisplatin from 35 mg/m² to 30 mg/m². He tolerates last very well. He is not neutropenic nor thrombocytopenic. He denies having any side effects. Mild tiredness. He will have MRI of the pelvis in 2 weeks Return in about 2 weeks (around 6/18/2019). Orders Placed:   Orders Placed This Encounter   Procedures    MRI Pelvis W WO Contrast     Standing Status:   Future     Number of Occurrences:   1     Standing Expiration Date:   6/3/2020        Medications Prescribed:   No orders of the defined types were placed in this encounter.

## 2019-06-17 ENCOUNTER — HOSPITAL ENCOUNTER (OUTPATIENT)
Dept: MRI IMAGING | Age: 74
Discharge: HOME | End: 2019-06-17
Payer: MEDICARE

## 2019-06-17 DIAGNOSIS — C67.9 UROTHELIAL CARCINOMA OF BLADDER (HCC): ICD-10-CM

## 2019-06-17 PROCEDURE — 6360000004 HC RX CONTRAST MEDICATION: Performed by: INTERNAL MEDICINE

## 2019-06-17 PROCEDURE — 72197 MRI PELVIS W/O & W/DYE: CPT

## 2019-06-17 PROCEDURE — A9579 GAD-BASE MR CONTRAST NOS,1ML: HCPCS | Performed by: INTERNAL MEDICINE

## 2019-06-17 RX ADMIN — GADOTERIDOL 15 ML: 279.3 INJECTION, SOLUTION INTRAVENOUS at 14:12

## 2019-06-18 ENCOUNTER — TELEPHONE (OUTPATIENT)
Dept: UROLOGY | Age: 74
End: 2019-06-18

## 2019-06-18 ENCOUNTER — OFFICE VISIT (OUTPATIENT)
Dept: ONCOLOGY | Age: 74
End: 2019-06-18
Payer: MEDICARE

## 2019-06-18 ENCOUNTER — HOSPITAL ENCOUNTER (OUTPATIENT)
Dept: INFUSION THERAPY | Age: 74
Discharge: HOME | End: 2019-06-18
Payer: MEDICARE

## 2019-06-18 ENCOUNTER — TELEPHONE (OUTPATIENT)
Dept: ONCOLOGY | Age: 74
End: 2019-06-18

## 2019-06-18 VITALS
HEART RATE: 87 BPM | TEMPERATURE: 99.1 F | BODY MASS INDEX: 29.95 KG/M2 | OXYGEN SATURATION: 97 % | SYSTOLIC BLOOD PRESSURE: 107 MMHG | RESPIRATION RATE: 18 BRPM | DIASTOLIC BLOOD PRESSURE: 64 MMHG | WEIGHT: 190.8 LBS | HEIGHT: 67 IN

## 2019-06-18 DIAGNOSIS — C67.9 UROTHELIAL CARCINOMA OF BLADDER (HCC): Primary | ICD-10-CM

## 2019-06-18 DIAGNOSIS — Z92.3 STATUS POST CHEMORADIATION: ICD-10-CM

## 2019-06-18 DIAGNOSIS — Z92.21 STATUS POST CHEMORADIATION: ICD-10-CM

## 2019-06-18 PROCEDURE — 99211 OFF/OP EST MAY X REQ PHY/QHP: CPT

## 2019-06-18 PROCEDURE — 99213 OFFICE O/P EST LOW 20 MIN: CPT | Performed by: INTERNAL MEDICINE

## 2019-06-18 PROCEDURE — G8417 CALC BMI ABV UP PARAM F/U: HCPCS | Performed by: INTERNAL MEDICINE

## 2019-06-18 PROCEDURE — 1123F ACP DISCUSS/DSCN MKR DOCD: CPT | Performed by: INTERNAL MEDICINE

## 2019-06-18 PROCEDURE — G8427 DOCREV CUR MEDS BY ELIG CLIN: HCPCS | Performed by: INTERNAL MEDICINE

## 2019-06-18 PROCEDURE — 4004F PT TOBACCO SCREEN RCVD TLK: CPT | Performed by: INTERNAL MEDICINE

## 2019-06-18 PROCEDURE — 4040F PNEUMOC VAC/ADMIN/RCVD: CPT | Performed by: INTERNAL MEDICINE

## 2019-06-18 PROCEDURE — 3017F COLORECTAL CA SCREEN DOC REV: CPT | Performed by: INTERNAL MEDICINE

## 2019-06-18 NOTE — TELEPHONE ENCOUNTER
Divine Berg from Dr. Ghassan Jimenez office called stating that Dr. Corry eJan said Alexx Luis will be due for his cystoscopy at the end of July. Attempted to contact Alexx Luis to schedule there was no answer no machine to leave a message. Will attempt again.

## 2019-06-18 NOTE — TELEPHONE ENCOUNTER
I spoke to Bette at [facility] and informed her that Dr Mickey Marie wants a cysto at the end of July 2019. The office will call him to schedule.

## 2019-06-28 ENCOUNTER — HOSPITAL ENCOUNTER (OUTPATIENT)
Dept: RADIATION ONCOLOGY | Age: 74
Discharge: HOME | End: 2019-06-28
Payer: MEDICARE

## 2019-06-28 VITALS
RESPIRATION RATE: 18 BRPM | SYSTOLIC BLOOD PRESSURE: 113 MMHG | OXYGEN SATURATION: 95 % | WEIGHT: 188.71 LBS | HEART RATE: 84 BPM | TEMPERATURE: 97.5 F | BODY MASS INDEX: 29.55 KG/M2 | DIASTOLIC BLOOD PRESSURE: 56 MMHG

## 2019-06-28 PROCEDURE — G0463 HOSPITAL OUTPT CLINIC VISIT: HCPCS | Performed by: RADIOLOGY

## 2019-06-28 PROCEDURE — 99212 OFFICE O/P EST SF 10 MIN: CPT | Performed by: RADIOLOGY

## 2019-06-28 NOTE — PROGRESS NOTES
[facility], [address]  Phone: [phone]   Toll Free: [phone]   Fax: [phone]    RADIATION ONCOLOGY FOLLOW UP REPORT    PATIENT NAME:  Shereen Hernandez              : 1945  MEDICAL RECORD NO: [id number]    LOCATION: [facility] NO: [id number]      PROVIDER: Dawna Pruett MD    DATE OF SERVICE: 2019      FOLLOW UP PHYSICIANS: Bipin Escobedo; Vasiliy Kraus, APRN-GE       DIAGNOSIS: C67.8 -- Malignant neoplasm of the urinary bladder, high-grade urothelial carcinoma, clear-cell type; cT2b N0 M0, Stage II (MRI scan is suggestive of subserosal extension, and worrisome for interstitial spread in perivesicle tissue).  Patient wishes to attempt bladder sparing.     DATE OF DIAGNOSIS: 2019    END OF TREATMENT: 2019    Treatment Course Number: 1     Treatment Site (s) Modality Dose (cGy From To Fractions/  Elapsed Days   Whole Bladder + Pelvic Nodes 6MV photons 2520 cGy 3/18/2019 2019 14/ 21   Boost to Bladder Tumor + Perivesicle Tissue 6MV photons 3960 cGy 2019      Cumulative Dose to Bladder Mass: 6480 cGy      ECOG PERFORMANCE STATUS: 1    PAIN: 0    CHAPERONE: Declined      HISTORY OF PRESENT ILLNESS:   Mr. Cornelius Beyer presented as a very nice 66-year-old gentleman who developed flank pain and had an episode of gross hematuria in late .  CT urogram performed in 2018 showed some thickening of the right posterior lateral bladder wall in addition to the presence of bladder calculus.  He underwent evaluation in urology, and in 2019 underwent cystoscopy.  This revealed a diverticulum involving the right side of the bladder with a calculus in the diverticulum and also within the bladder proper. Reda Reilly was evidence of malignancy on the right side, involving the trigone and distal right bladder.  The tumor obscured/covered over the opening of the diverticulum and also extended into the diverticulum.  TURBT was performed in late January 2019.  Final pathology showed high-grade invasive urothelial carcinoma.  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  During his urology follow-up, Mr. Brasher Bibles a recommendation for neoadjuvant chemotherapy followed by cystectomy, which is the treatment of choice for invasive latter cancers. Silvia Elena was seen by Dr. Jillian Bernstein in hematology/oncology, and having done some additional research into his options, he indicated to her that he wishes to attempt bladder preservation with combined chemotherapy and radiation therapy.  Mr. Mark Ny was seen today 2/27/2019 for evaluation and discussion regarding the potential role of radiation therapy in the management of his bladder cancer. He was offered attempted organ preservation, but cautioned at length that his cancer has unusual histology with only minimal data available in the medical literature, and no reported cases of organ preservation in the context of this histology. He also was cautioned that even with more typical urothelial carcinomas, organ preservation is not uniformly successful, and a significant percentage of individuals will require cystectomy. He indicated that he understood the discussion, including the cautioned area comments, and wished to proceed with attempted organ preservation. INTERVAL HISTORY:   Chava Sterling returns to radiation oncology clinic today 6/28/2019 for a posttreatment checkup. Twila Bran feels well. His irritative urinary symptoms have markedly improved. He does not have any complaints related to radiation therapy at this time. TESTS:   RADIOLOGIC STUDIES:  6/17/2019: MRI Pelvis With/Without Contrast:  Impression   1.  There is a stable diverticulum projecting from the posterior margin of the right wall of the urinary bladder measuring 2.6 x 4.6 x 3.3 cm in longitudinal, transverse and AP dimensions. There is a smaller irregular enhancing mass associated with the    diverticula wall consistent with the patient's known malignancy measuring 2.9 x 2.5 x 2.7 cm on the current examination and 4.1 x 3.0 x 3.9 cm on the previous examination. There are persistent however improved enhancing densities within the fat    surrounding the mass can be associated with lymphangitic spread of carcinoma. No pathologically enlarged lymphadenopathy is identified. There is stable uniform mild circumferential thickening of the remainder of the urinary bladder wall. LABORATORY STUDIES:   6/3/2019: CBC:  WBC 3.3Low   4.8 - 10.8 thou/mm3   RBC 3.61Low   4.70 - 6.10 mill/mm3   Hemoglobin 12. 0Low   14.0 - 18.0 gm/dl   Hematocrit 33.8Low   42.0 - 52.0 %   MCV 94  80 - 94 fL   MCH 33.3High   27.0 - 31.0 pg   MCHC 35.6  33.0 - 37.0 gm/dl   RDW 16.0High   11.5 - 14.5 %   Platelets [id number]   107 - 400 thou/mm3   MPV 7.2Low   7.4 - 10.4 fL   Seg Neutrophils 58  43 - 75 %   Lymphocytes 30  15 - 47 %   Monocytes 9  0 - 12 %   Eosinophils % 3  0 - 4 %   BASINOPHIL 0  0 - 3 %     6/3/2019: Hepatic Function Panel:  Alb 4.0  3.5 - 5.1 g/dL   Total Bilirubin 0.5  0.3 - 1.2 mg/dL   Bilirubin, Direct <0.2  0.0 - 0.3 mg/dL   Alkaline Phosphatase 124  38 - 126 U/L   AST 14  5 - 40 U/L   ALT 11  11 - 66 U/L   Total Protein 6.4  6.1 - 8.0 g/dL         MEDICATIONS:   Current Outpatient Medications   Medication Sig Dispense Refill    diclofenac (VOLTAREN) 50 MG EC tablet Take 1 tablet by mouth 2 times daily 60 tablet 3    linagliptin (TRADJENTA) 5 MG tablet Take 5 mg by mouth daily      Multiple Vitamins-Minerals (CVS SPECTRAVITE ADULT 50+ PO) Take by mouth      Saw [place] 450 MG CAPS Take by mouth daily Indications: 3-4 times per week PRN       atorvastatin (LIPITOR) 40 MG tablet Take 40 mg by mouth daily      metFORMIN (GLUCOPHAGE) 500 MG tablet Take 1,000 mg by mouth 2 times daily (with meals)       levothyroxine (SYNTHROID) 125 MCG tablet Take 125 mcg by mouth Daily      prednisoLONE acetate (PRED FORTE) 1 % ophthalmic suspension 1 drop 4 times daily      timolol (BETIMOL) 0.5 % ophthalmic solution 1 drop 2 times daily      brimonidine (ALPHAGAN) 0.2 % ophthalmic solution 1 drop 3 times daily      cyclopentolate (CYCLOGYL) 1 % ophthalmic solution 1 drop once       No current facility-administered medications for this encounter. REVIEW OF SYSTEMS:  Constitutional: Persistent fatigue, but overall feels well. Denies fever, chills, unintentional weight change. H EENT: Stable. Denies new hearing or vision change. Denies dysphagia or odynophagia. Respiratory: Denies hemoptysis, coughing, wheezing or sputum. Cardiovascular: Denies chest pain, palpitations or syncope. GI: Denies nausea, vomiting, hematemesis, rectal bleeding or change in bowel habits. : Denies hematuria or dysuria. Metabolic/endocrine: No new complaints. Musculoskeletal: No new complaints. Extremities: No new complaints. Neurological: Denies seizures, fainting or tremors. Integument: No new complaints. EXAMINATION:   GENERAL: Pleasant well-developed adult male alert and oriented x3 in no obvious cardiorespiratory distress  VITAL SIGNS: Within normal limits  HEENT: Normal cephalic atraumatic. Right eye blindness, stable. His nose and lips unremarkable on external examination. NECK: No cervical lymphadenopathy. No JVD. LYMPH: No supraclavicular or axillary lymphadenopathy. LUNGS: Somewhat distant breath sounds, clear. HEART: Nontachycardic. ABDOMEN: Nondistended nontender with unremarkable bowel sounds. EXTREMITIES: No clubbing or cyanosis. NEUROLOGIC EXAMINATION: Right eye blindness, cranial nerves otherwise grossly intact. ASSESSMENT:  1. Recuperating well from radiation therapy. 2.  No unexpected complications related to radiation therapy.   3.  Initial posttreatment imaging indicates favorable response

## 2019-06-28 NOTE — PROGRESS NOTES
Alexy Rudolph  6/28/2019    Patient is seen today for follow up. Vitals:    06/28/19 1415   BP: (!) 113/56   Pulse: 84   Resp: 18   Temp: 97.5 °F (36.4 °C)   SpO2: 95%    : Wt Readings from Last 3 Encounters:   06/28/19 188 lb 11.4 oz (85.6 kg)   06/18/19 190 lb 12.8 oz (86.5 kg)   06/03/19 188 lb 12.8 oz (85.6 kg)              No Known Allergies     Current Outpatient Medications   Medication Sig Dispense Refill    diclofenac (VOLTAREN) 50 MG EC tablet Take 1 tablet by mouth 2 times daily 60 tablet 3    linagliptin (TRADJENTA) 5 MG tablet Take 5 mg by mouth daily      Multiple Vitamins-Minerals (CVS SPECTRAVITE ADULT 50+ PO) Take by mouth      Saw [place] 450 MG CAPS Take by mouth daily Indications: 3-4 times per week PRN       atorvastatin (LIPITOR) 40 MG tablet Take 40 mg by mouth daily      metFORMIN (GLUCOPHAGE) 500 MG tablet Take 1,000 mg by mouth 2 times daily (with meals)       levothyroxine (SYNTHROID) 125 MCG tablet Take 125 mcg by mouth Daily      prednisoLONE acetate (PRED FORTE) 1 % ophthalmic suspension 1 drop 4 times daily      timolol (BETIMOL) 0.5 % ophthalmic solution 1 drop 2 times daily      brimonidine (ALPHAGAN) 0.2 % ophthalmic solution 1 drop 3 times daily      cyclopentolate (CYCLOGYL) 1 % ophthalmic solution 1 drop once       No current facility-administered medications for this encounter. Additional Comments: Pt is seeing Dr. Zahraa Gonzalez today.       Herlinda BANEGASN, RN

## 2019-07-21 ASSESSMENT — ENCOUNTER SYMPTOMS
COLOR CHANGE: 0
SHORTNESS OF BREATH: 0
NAUSEA: 0
WHEEZING: 0
ABDOMINAL PAIN: 0
CONSTIPATION: 0
BLOOD IN STOOL: 0
COUGH: 0
SORE THROAT: 0
DIARRHEA: 0
RECTAL PAIN: 0
VOMITING: 0
EYE DISCHARGE: 0
ABDOMINAL DISTENTION: 0
CHEST TIGHTNESS: 0
FACIAL SWELLING: 0
BACK PAIN: 0
TROUBLE SWALLOWING: 0

## 2019-07-21 NOTE — PROGRESS NOTES
Health Maintenance   Topic Date Due    Hepatitis C screen  1945    DTaP/Tdap/Td vaccine (1 - Tdap) 12/16/1964    Lipid screen  12/16/1985    Shingles Vaccine (1 of 2) 12/16/1995    Colon cancer screen colonoscopy  12/16/1995    Annual Wellness Visit (AWV)  12/16/2008    Pneumococcal 65+ years Vaccine (1 of 2 - PCV13) 12/16/2010    Flu vaccine (1) 09/01/2019    Low dose CT lung screening  01/04/2020    AAA screen  Completed        Subjective:   Review of Systems   Constitutional: Negative for activity change, appetite change, fatigue and fever. HENT: Negative for congestion, dental problem, facial swelling, hearing loss, mouth sores, nosebleeds, sore throat, tinnitus and trouble swallowing. Eyes: Negative for discharge and visual disturbance. Respiratory: Negative for cough, chest tightness, shortness of breath and wheezing. Cardiovascular: Negative for chest pain, palpitations and leg swelling. Gastrointestinal: Negative for abdominal distention, abdominal pain, blood in stool, constipation, diarrhea, nausea, rectal pain and vomiting. Endocrine: Negative for cold intolerance, polydipsia and polyuria. Genitourinary: Negative for decreased urine volume, difficulty urinating, dysuria, flank pain, hematuria and urgency. Musculoskeletal: Negative for arthralgias, back pain, gait problem, joint swelling, myalgias and neck stiffness. Skin: Negative for color change, rash and wound. Neurological: Negative for dizziness, tremors, seizures, speech difficulty, weakness, light-headedness, numbness and headaches. Hematological: Negative for adenopathy. Does not bruise/bleed easily. Psychiatric/Behavioral: Negative for confusion and sleep disturbance. The patient is not nervous/anxious. Objective:   Physical Exam   Constitutional: He is oriented to person, place, and time. He appears well-developed and well-nourished. No distress. HENT:   Head: Normocephalic.    Mouth/Throat: gemcitabine and cisplatin. He had MRI of the pelvis that showed a diverticulum extending from the posterior margin of the right lateral wall the urinary bladder with an associated enhancing mass lesion which measures 3.6 x 3.1 x 3.9 cm in size. The patient refused to have surgery. During original visit we discussed the alternative options. I explained to him that the only potentially curative option is surgery. For patients who are  medically inoperable due to comorbidity and frailty, chemoradiation may be a reasonable alternative. Some elderly patients with invasive bladder cancer can be cured by cisplatin-based regimen and radiation therapy or at least effectively palliated for sustained periods measured in months to years. The patient understand that this is not the preferred and recommended approach, but he decided to proceed with this therapy. 2.  Concurrent chemoradiation. He started concurrent chemoradiation on March 18, 2019 and completed on May 15, 2019. Overall he tolerated treatment with cisplatin well. He had MRI of the pelvis done approximately 4 weeks from completion of concurrent chemoradiation. Showed nice response to treatment, however MRI does not eliminate need for direct visualization with cystoscopy. Will contact Dr. Lorri Agarwal office to schedule cystoscopy at end of July 2019. Return in about 2 months (around 8/28/2019). Orders Placed:   No orders of the defined types were placed in this encounter. Medications Prescribed:   No orders of the defined types were placed in this encounter.

## 2019-07-29 ENCOUNTER — PROCEDURE VISIT (OUTPATIENT)
Dept: UROLOGY | Age: 74
End: 2019-07-29
Payer: MEDICARE

## 2019-07-29 VITALS
WEIGHT: 188 LBS | HEIGHT: 67 IN | BODY MASS INDEX: 29.51 KG/M2 | SYSTOLIC BLOOD PRESSURE: 118 MMHG | DIASTOLIC BLOOD PRESSURE: 74 MMHG

## 2019-07-29 DIAGNOSIS — C67.9 MALIGNANT NEOPLASM OF URINARY BLADDER, UNSPECIFIED SITE (HCC): Primary | ICD-10-CM

## 2019-07-29 LAB
BILIRUBIN URINE: NEGATIVE
BLOOD URINE, POC: ABNORMAL
CHARACTER, URINE: CLEAR
COLOR, URINE: YELLOW
GLUCOSE URINE: NEGATIVE MG/DL
KETONES, URINE: NEGATIVE
LEUKOCYTE CLUMPS, URINE: ABNORMAL
NITRITE, URINE: NEGATIVE
PH, URINE: 6 (ref 5–9)
PROTEIN, URINE: ABNORMAL MG/DL
SPECIFIC GRAVITY, URINE: 1.02 (ref 1–1.03)
UROBILINOGEN, URINE: 0.2 EU/DL (ref 0–1)

## 2019-07-29 PROCEDURE — 52000 CYSTOURETHROSCOPY: CPT | Performed by: UROLOGY

## 2019-07-29 PROCEDURE — 81003 URINALYSIS AUTO W/O SCOPE: CPT | Performed by: UROLOGY

## 2019-08-28 ENCOUNTER — HOSPITAL ENCOUNTER (OUTPATIENT)
Dept: INFUSION THERAPY | Age: 74
Discharge: HOME | End: 2019-08-28
Payer: MEDICARE

## 2019-08-28 ENCOUNTER — OFFICE VISIT (OUTPATIENT)
Dept: ONCOLOGY | Age: 74
End: 2019-08-28
Payer: MEDICARE

## 2019-08-28 VITALS
TEMPERATURE: 98.6 F | RESPIRATION RATE: 18 BRPM | HEART RATE: 79 BPM | BODY MASS INDEX: 29.54 KG/M2 | WEIGHT: 188.2 LBS | SYSTOLIC BLOOD PRESSURE: 125 MMHG | HEIGHT: 67 IN | DIASTOLIC BLOOD PRESSURE: 84 MMHG | OXYGEN SATURATION: 93 %

## 2019-08-28 DIAGNOSIS — Z92.3 STATUS POST CHEMORADIATION: ICD-10-CM

## 2019-08-28 DIAGNOSIS — Z92.21 STATUS POST CHEMORADIATION: ICD-10-CM

## 2019-08-28 DIAGNOSIS — C67.9 UROTHELIAL CARCINOMA OF BLADDER (HCC): Primary | ICD-10-CM

## 2019-08-28 PROCEDURE — 1123F ACP DISCUSS/DSCN MKR DOCD: CPT | Performed by: INTERNAL MEDICINE

## 2019-08-28 PROCEDURE — G8417 CALC BMI ABV UP PARAM F/U: HCPCS | Performed by: INTERNAL MEDICINE

## 2019-08-28 PROCEDURE — G8427 DOCREV CUR MEDS BY ELIG CLIN: HCPCS | Performed by: INTERNAL MEDICINE

## 2019-08-28 PROCEDURE — 4040F PNEUMOC VAC/ADMIN/RCVD: CPT | Performed by: INTERNAL MEDICINE

## 2019-08-28 PROCEDURE — 99213 OFFICE O/P EST LOW 20 MIN: CPT | Performed by: INTERNAL MEDICINE

## 2019-08-28 PROCEDURE — 4004F PT TOBACCO SCREEN RCVD TLK: CPT | Performed by: INTERNAL MEDICINE

## 2019-08-28 PROCEDURE — 99211 OFF/OP EST MAY X REQ PHY/QHP: CPT

## 2019-08-28 PROCEDURE — 3017F COLORECTAL CA SCREEN DOC REV: CPT | Performed by: INTERNAL MEDICINE

## 2019-08-28 ASSESSMENT — ENCOUNTER SYMPTOMS
WHEEZING: 0
DIARRHEA: 0
CHEST TIGHTNESS: 0
TROUBLE SWALLOWING: 0
NAUSEA: 0
SORE THROAT: 0
CONSTIPATION: 0
ABDOMINAL DISTENTION: 0
VOMITING: 0
COUGH: 0
BACK PAIN: 0
FACIAL SWELLING: 0
ABDOMINAL PAIN: 0
SHORTNESS OF BREATH: 0
EYE DISCHARGE: 0
COLOR CHANGE: 0
BLOOD IN STOOL: 0
RECTAL PAIN: 0

## 2019-08-29 NOTE — PROGRESS NOTES
He feels quite well, he denies having any peripheral neuropathy, no hearing problems. He denies hematuria. Denies having any fevers, no flank pain. No active bleeding.       HPI   Past Medical History:   Diagnosis Date    Anxiety     Cataract     LEFT EYE    Diabetes mellitus ([address]     Glaucoma     LEFT EYE    History of hematuria     Hyperlipidemia    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     SINCE CHILDHOOD    Thyroid disease     Type 2 diabetes mellitus without complication (HCC)       Past Surgical History:   Procedure Laterality Date    CYSTOSCOPY N/A 1/30/2019    TRANSURETHRAL RESECTION BLADDER TUMOR, CYSTOLITHOLAPAXY performed by Ivana Roca MD at [address] [place] [address]        Family History   Problem Relation Age of Onset    Other Mother         BRAIN TUMOR    Heart Disease Father       Social History     Tobacco Use    Smoking status: Current Every Day Smoker     Packs/day: 1.50     Years: 53.00     Pack years: 79.50     Types: Cigarettes    Smokeless tobacco: Never Used    Tobacco comment: depending on tumor analysis   Substance Use Topics    Alcohol use: No     Comment: not in 40 years      Current Outpatient Medications   Medication Sig Dispense Refill    Varenicline Tartrate (CHANTIX PO) Take by mouth      linagliptin (TRADJENTA) 5 MG tablet Take 5 mg by mouth daily      Multiple Vitamins-Minerals ([employer] SPECTRAVITE ADULT 50+ PO) Take by mouth      Saw Townsend 450 MG CAPS Take by mouth daily Indications: 3-4 times per week PRN       atorvastatin (LIPITOR) 40 MG tablet Take 40 mg by mouth daily      metFORMIN (GLUCOPHAGE) 500 MG tablet Take 1,000 mg by mouth 2 times daily (with meals)       levothyroxine (SYNTHROID) 125 MCG tablet Take 125 mcg by mouth Daily      prednisoLONE acetate (PRED FORTE) 1 % ophthalmic suspension 1 drop 4 times daily      timolol (BETIMOL) 0.5 % ophthalmic solution 1 drop 2 times daily  brimonidine (ALPHAGAN) 0.2 % ophthalmic solution 1 drop 3 times daily      cyclopentolate (CYCLOGYL) 1 % ophthalmic solution 1 drop once       No current facility-administered medications for this visit. No Known Allergies   Health Maintenance   Topic Date Due    Hepatitis C screen  1945    DTaP/Tdap/Td vaccine (1 - Tdap) 12/16/1964    Lipid screen  12/16/1985    Shingles Vaccine (1 of 2) 12/16/1995    Colon cancer screen colonoscopy  12/16/1995    Annual Wellness Visit (AWV)  12/16/2008    Pneumococcal 65+ years Vaccine (2 of 2 - PCV13) 04/05/2019    Flu vaccine (1) 09/01/2019    Low dose CT lung screening  01/04/2020    AAA screen  Completed        Subjective:   Review of Systems   Constitutional: Negative for activity change, appetite change, fatigue and fever. HENT: Negative for congestion, dental problem, facial swelling, hearing loss, mouth sores, nosebleeds, sore throat, tinnitus and trouble swallowing. Eyes: Negative for discharge and visual disturbance. Respiratory: Negative for cough, chest tightness, shortness of breath and wheezing. Cardiovascular: Negative for chest pain, palpitations and leg swelling. Gastrointestinal: Negative for abdominal distention, abdominal pain, blood in stool, constipation, diarrhea, nausea, rectal pain and vomiting. Endocrine: Negative for cold intolerance, polydipsia and polyuria. Genitourinary: Negative for decreased urine volume, difficulty urinating, dysuria, flank pain, hematuria and urgency. Musculoskeletal: Negative for arthralgias, back pain, gait problem, joint swelling, myalgias and neck stiffness. Skin: Negative for color change, rash and wound. Neurological: Negative for dizziness, tremors, seizures, speech difficulty, weakness, light-headedness, numbness and headaches. Hematological: Negative for adenopathy. Does not bruise/bleed easily. Psychiatric/Behavioral: Negative for confusion and sleep disturbance.  The in addition there is irregular thickening of the right posterior lateral bladder    wall and these findings are highly suspicious for the presence of neoplasia         CT of the chest on January 4, 2019 showed:  1.  Bochdalek hernia with resultant intrathoracic stomach and large amount of mesenteric fat in the left hemithorax. 2. 5 mm noncalcified nodule right middle lobe. One-year follow-up recommended. Lab Results   Component Value Date    WBC 3.3 (L) 06/03/2019    HGB 12.0 (L) 06/03/2019    HCT 33.8 (L) 06/03/2019    MCV 94 06/03/2019     (L) 06/03/2019       Chemistry        Component Value Date/Time     06/03/2019 1226     01/31/2019 0814    K 4.7 06/03/2019 1226    K 4.2 01/31/2019 0814     01/31/2019 0814    CO2 22 (L) 01/31/2019 0814    BUN 9 01/31/2019 0814    CREATININE 0.7 06/03/2019 1226    CREATININE 0.6 04/08/2019 1015        Component Value Date/Time    CALCIUM 8.1 (L) 01/31/2019 0814    ALKPHOS 124 06/03/2019 1226    AST 14 06/03/2019 1226    ALT 11 06/03/2019 1226    BILITOT 0.5 06/03/2019 1226        MRI of the pelvis on March 5, 2019 showed:  1. There is a diverticulum extending from the posterior margin of the right lateral wall the urinary bladder with an associated enhancing mass lesion. 2. The diverticulum including the mass lesion measures 6.0 x 3.7 x 3.9 cm in longitudinal, transverse and AP dimensions. The diverticulum excluding the mass lesion measures 3.6 x 3.1 x 3.9 cm in longitudinal, transverse and AP dimensions. The    diverticular mass measures 4.3 x 3.3 x 3.9 cm.   3. The enhancing mass involves the distal margins of the lateral wall of the diverticulum and also the posterior margin of the lateral wall of the urinary bladder adjacent to this region. This mass is low signal intensity on the T1 and T2-weighted      MRI of the pelvis on June 17, 2019 showed:  1.  There is a stable diverticulum projecting from the posterior margin of the right wall Medications Prescribed:   No orders of the defined types were placed in this encounter.

## 2019-09-20 ENCOUNTER — HOSPITAL ENCOUNTER (OUTPATIENT)
Dept: RADIATION ONCOLOGY | Age: 74
Discharge: HOME | End: 2019-09-20
Payer: MEDICARE

## 2019-09-20 VITALS
DIASTOLIC BLOOD PRESSURE: 59 MMHG | TEMPERATURE: 97.3 F | WEIGHT: 188.25 LBS | RESPIRATION RATE: 18 BRPM | HEART RATE: 78 BPM | OXYGEN SATURATION: 95 % | SYSTOLIC BLOOD PRESSURE: 121 MMHG | BODY MASS INDEX: 29.48 KG/M2

## 2019-09-20 PROCEDURE — 99212 OFFICE O/P EST SF 10 MIN: CPT | Performed by: RADIOLOGY

## 2019-09-20 NOTE — PROGRESS NOTES
diverticulum.  TURBT was performed in late January 2019.  Final pathology showed high-grade invasive urothelial carcinoma.  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  During his urology follow-up, Mr. Cookie Armendarizan a recommendation for neoadjuvant chemotherapy followed by cystectomy, which is the treatment of choice for invasive latter cancers. [facility] was seen by Dr. Mariia Carreon in hematology/oncology, and having done some additional research into his options, he indicated to her that he wishes to attempt bladder preservation with combined chemotherapy and radiation therapy.  Mr. Rachel Kim was seen today 2/27/2019 for evaluation and discussion regarding the potential role of radiation therapy in the management of his bladder cancer. He was offered attempted organ preservation, but cautioned at length that his cancer has unusual histology with only minimal data available in the medical literature, and no reported cases of organ preservation in the context of this histology. He also was cautioned that even with more typical urothelial carcinomas, organ preservation is not uniformly successful, and a significant percentage of individuals will require cystectomy. He indicated that he understood the discussion, including the cautioned area comments, and wished to proceed with attempted organ preservation  Mr. Beyer tolerated his radiation therapy fairly well. He developed increased fatigue, and some mild crampy discomfort in the low pelvis. His main issues during treatment had to do with concern regarding his radiation therapy prescription, and with his chemotherapy. He developed cytopenias which required brief treatment interruption. His counts recovered, but he did require a dose adjustment for his chemotherapy.   The initial treatment prescription was to deliver 4500 cGy to the whole bladder, area of concern for perivesicle extension and pelvic lymph nodes, followed by a cone down boost to the grossly visible bladder tumor and area of concern for perivesicle extension. However, Mr. Crystal Hummel was convinced that treatment to the larger area including potential subclinical sites somehow detracted from the treatment to the primary tumor, though I attempted to explain that the cumulative dose to the bladder tumor was unaffected. Because of his concerns, the treatment prescription was adjusted. Treatment to the larger area encompassing bladder tumor and sites of potential subclinical spread was discontinued after 2520 cGy, and the remainder of the treatment was delivered to the smaller area of known tumor plus suspected perivesicle spread, which received the initially planned cumulative dose of 6480 cGy. Towards the latter part of treatment, there was some evidence of tumor response seen on the cone beam CT imaging. Mr. Crystal Hummel did not have any unexpected side effects during the treatment course. INTERVAL HISTORY:   Consuelo Carrillo returns to radiation oncology today 9/20/2019 for a follow-up appointment after undergoing chemoradiotherapy for his bladder cancer. Karan Bosworth feels well, but with some residual fatigue. His very pleased having recently undergone cystoscopy by Dr. Sagar Frank. There was no evidence of gross residual malignancy in the bladder. MRI scan in June did not show any lymphadenopathy, though there was some persistent stranding in the fatty tissues adjacent to the bladder. Karan Bosworth does not have any residual complaints related to his radiation therapy. TESTS:   8/11/2019: Cystoscopy:  After obtaining informed consent and prepping the urethral meatus, a 16-Bhutanese flexible cystoscope was passed per urethra into the bladder. The urethra was evaluated on the way in and then again on the way out and was found to be normal.  The prostate was moderately obstructing.   The bladder was evaluated in its endoscopic entirety and found to a large

## 2019-10-30 ENCOUNTER — OFFICE VISIT (OUTPATIENT)
Dept: ONCOLOGY | Age: 74
End: 2019-10-30
Payer: MEDICARE

## 2019-10-30 ENCOUNTER — HOSPITAL ENCOUNTER (OUTPATIENT)
Dept: INFUSION THERAPY | Age: 74
Discharge: HOME | End: 2019-10-30
Payer: MEDICARE

## 2019-10-30 VITALS
HEART RATE: 88 BPM | HEIGHT: 67 IN | DIASTOLIC BLOOD PRESSURE: 75 MMHG | SYSTOLIC BLOOD PRESSURE: 137 MMHG | WEIGHT: 191.8 LBS | TEMPERATURE: 98.8 F | BODY MASS INDEX: 30.1 KG/M2 | OXYGEN SATURATION: 94 % | RESPIRATION RATE: 18 BRPM

## 2019-10-30 DIAGNOSIS — C67.9 UROTHELIAL CARCINOMA OF BLADDER (HCC): ICD-10-CM

## 2019-10-30 DIAGNOSIS — C67.9 UROTHELIAL CARCINOMA OF BLADDER (HCC): Primary | ICD-10-CM

## 2019-10-30 DIAGNOSIS — Z92.3 STATUS POST CHEMORADIATION: ICD-10-CM

## 2019-10-30 DIAGNOSIS — Z92.21 STATUS POST CHEMORADIATION: ICD-10-CM

## 2019-10-30 LAB
BUN, WHOLE BLOOD: 20 MG/DL (ref 8–26)
CHLORIDE, WHOLE BLOOD: 102 MEQ/L (ref 98–109)
CREATININE, WHOLE BLOOD: 0.8 MG/DL (ref 0.5–1.2)
GFR, ESTIMATED: > 90 ML/MIN/1.73M2
GLUCOSE, WHOLE BLOOD: 143 MG/DL (ref 70–108)
HCT VFR BLD CALC: 41.5 % (ref 42–52)
HEMOGLOBIN: 14.2 GM/DL (ref 14–18)
IONIZED CALCIUM, WHOLE BLOOD: 1.24 MMOL/L (ref 1.12–1.32)
MCH RBC QN AUTO: 31.6 PG (ref 27–31)
MCHC RBC AUTO-ENTMCNC: 34.2 GM/DL (ref 33–37)
MCV RBC AUTO: 92 FL (ref 80–94)
PDW BLD-RTO: 11.7 % (ref 11.5–14.5)
PLATELET # BLD: 196 THOU/MM3 (ref 130–400)
PMV BLD AUTO: 6.7 FL (ref 7.4–10.4)
POTASSIUM, WHOLE BLOOD: 4.9 MEQ/L (ref 3.5–4.9)
RBC # BLD: 4.49 MILL/MM3 (ref 4.7–6.1)
SEG NEUTROPHILS: 62 % (ref 43–75)
SEGMENTED NEUTROPHILS ABSOLUTE COUNT: 4 THOU/MM3 (ref 1.8–7.7)
SODIUM, WHOLE BLOOD: 139 MEQ/L (ref 138–146)
TOTAL CO2, WHOLE BLOOD: 28 MEQ/L (ref 23–33)
WBC # BLD: 6.5 THOU/MM3 (ref 4.8–10.8)

## 2019-10-30 PROCEDURE — 4040F PNEUMOC VAC/ADMIN/RCVD: CPT | Performed by: INTERNAL MEDICINE

## 2019-10-30 PROCEDURE — G8427 DOCREV CUR MEDS BY ELIG CLIN: HCPCS | Performed by: INTERNAL MEDICINE

## 2019-10-30 PROCEDURE — G8417 CALC BMI ABV UP PARAM F/U: HCPCS | Performed by: INTERNAL MEDICINE

## 2019-10-30 PROCEDURE — 99211 OFF/OP EST MAY X REQ PHY/QHP: CPT

## 2019-10-30 PROCEDURE — 85027 COMPLETE CBC AUTOMATED: CPT

## 2019-10-30 PROCEDURE — G8484 FLU IMMUNIZE NO ADMIN: HCPCS | Performed by: INTERNAL MEDICINE

## 2019-10-30 PROCEDURE — 3017F COLORECTAL CA SCREEN DOC REV: CPT | Performed by: INTERNAL MEDICINE

## 2019-10-30 PROCEDURE — 80047 BASIC METABLC PNL IONIZED CA: CPT

## 2019-10-30 PROCEDURE — 1123F ACP DISCUSS/DSCN MKR DOCD: CPT | Performed by: INTERNAL MEDICINE

## 2019-10-30 PROCEDURE — 36415 COLL VENOUS BLD VENIPUNCTURE: CPT

## 2019-10-30 PROCEDURE — 99213 OFFICE O/P EST LOW 20 MIN: CPT | Performed by: INTERNAL MEDICINE

## 2019-10-30 PROCEDURE — 4004F PT TOBACCO SCREEN RCVD TLK: CPT | Performed by: INTERNAL MEDICINE

## 2019-10-30 ASSESSMENT — ENCOUNTER SYMPTOMS
BLOOD IN STOOL: 0
COLOR CHANGE: 0
EYE DISCHARGE: 0
ABDOMINAL PAIN: 0
COUGH: 0
CHEST TIGHTNESS: 0
BACK PAIN: 0
WHEEZING: 0
DIARRHEA: 0
NAUSEA: 0
VOMITING: 0
SORE THROAT: 0
RECTAL PAIN: 0
FACIAL SWELLING: 0
TROUBLE SWALLOWING: 0
CONSTIPATION: 0
SHORTNESS OF BREATH: 0
ABDOMINAL DISTENTION: 0

## 2020-01-23 ENCOUNTER — HOSPITAL ENCOUNTER (OUTPATIENT)
Dept: MRI IMAGING | Age: 75
Discharge: HOME | End: 2020-01-23
Payer: MEDICARE

## 2020-01-23 LAB — POC CREATININE WHOLE BLOOD: 0.9 MG/DL (ref 0.5–1.2)

## 2020-01-23 PROCEDURE — 82565 ASSAY OF CREATININE: CPT

## 2020-01-23 PROCEDURE — 72197 MRI PELVIS W/O & W/DYE: CPT

## 2020-01-23 PROCEDURE — 6360000004 HC RX CONTRAST MEDICATION: Performed by: INTERNAL MEDICINE

## 2020-01-23 PROCEDURE — A9579 GAD-BASE MR CONTRAST NOS,1ML: HCPCS | Performed by: INTERNAL MEDICINE

## 2020-01-23 RX ADMIN — GADOTERIDOL 15 ML: 279.3 INJECTION, SOLUTION INTRAVENOUS at 15:24

## 2020-01-30 ENCOUNTER — OFFICE VISIT (OUTPATIENT)
Dept: ONCOLOGY | Age: 75
End: 2020-01-30
Payer: MEDICARE

## 2020-01-30 ENCOUNTER — HOSPITAL ENCOUNTER (OUTPATIENT)
Dept: INFUSION THERAPY | Age: 75
Discharge: HOME | End: 2020-01-30
Payer: MEDICARE

## 2020-01-30 VITALS
DIASTOLIC BLOOD PRESSURE: 62 MMHG | BODY MASS INDEX: 29.47 KG/M2 | OXYGEN SATURATION: 95 % | SYSTOLIC BLOOD PRESSURE: 132 MMHG | TEMPERATURE: 97.8 F | HEIGHT: 67 IN | RESPIRATION RATE: 18 BRPM | WEIGHT: 187.8 LBS | HEART RATE: 66 BPM

## 2020-01-30 DIAGNOSIS — C67.9 UROTHELIAL CARCINOMA OF BLADDER (HCC): ICD-10-CM

## 2020-01-30 LAB
BUN, WHOLE BLOOD: 11 MG/DL (ref 8–26)
CHLORIDE, WHOLE BLOOD: 100 MEQ/L (ref 98–109)
CREATININE, WHOLE BLOOD: 0.8 MG/DL (ref 0.5–1.2)
GFR, ESTIMATED: > 90 ML/MIN/1.73M2
GLUCOSE, WHOLE BLOOD: 177 MG/DL (ref 70–108)
HCT VFR BLD CALC: 42 % (ref 42–52)
HEMOGLOBIN: 14.2 GM/DL (ref 14–18)
IONIZED CALCIUM, WHOLE BLOOD: 1.21 MMOL/L (ref 1.12–1.32)
MCH RBC QN AUTO: 30.8 PG (ref 27–31)
MCHC RBC AUTO-ENTMCNC: 33.8 GM/DL (ref 33–37)
MCV RBC AUTO: 91 FL (ref 80–94)
PDW BLD-RTO: 13 % (ref 11.5–14.5)
PLATELET # BLD: 160 THOU/MM3 (ref 130–400)
PMV BLD AUTO: 6.8 FL (ref 7.4–10.4)
POTASSIUM, WHOLE BLOOD: 4.4 MEQ/L (ref 3.5–4.9)
RBC # BLD: 4.62 MILL/MM3 (ref 4.7–6.1)
SEG NEUTROPHILS: 66 % (ref 43–75)
SEGMENTED NEUTROPHILS ABSOLUTE COUNT: 4.4 THOU/MM3 (ref 1.8–7.7)
SODIUM, WHOLE BLOOD: 136 MEQ/L (ref 138–146)
TOTAL CO2, WHOLE BLOOD: 28 MEQ/L (ref 23–33)
WBC # BLD: 6.7 THOU/MM3 (ref 4.8–10.8)

## 2020-01-30 PROCEDURE — 85027 COMPLETE CBC AUTOMATED: CPT

## 2020-01-30 PROCEDURE — 84443 ASSAY THYROID STIM HORMONE: CPT

## 2020-01-30 PROCEDURE — G8427 DOCREV CUR MEDS BY ELIG CLIN: HCPCS | Performed by: PHYSICIAN ASSISTANT

## 2020-01-30 PROCEDURE — 4004F PT TOBACCO SCREEN RCVD TLK: CPT | Performed by: PHYSICIAN ASSISTANT

## 2020-01-30 PROCEDURE — 99211 OFF/OP EST MAY X REQ PHY/QHP: CPT

## 2020-01-30 PROCEDURE — 1123F ACP DISCUSS/DSCN MKR DOCD: CPT | Performed by: PHYSICIAN ASSISTANT

## 2020-01-30 PROCEDURE — G8417 CALC BMI ABV UP PARAM F/U: HCPCS | Performed by: PHYSICIAN ASSISTANT

## 2020-01-30 PROCEDURE — 4040F PNEUMOC VAC/ADMIN/RCVD: CPT | Performed by: PHYSICIAN ASSISTANT

## 2020-01-30 PROCEDURE — G8484 FLU IMMUNIZE NO ADMIN: HCPCS | Performed by: PHYSICIAN ASSISTANT

## 2020-01-30 PROCEDURE — 84439 ASSAY OF FREE THYROXINE: CPT

## 2020-01-30 PROCEDURE — 36415 COLL VENOUS BLD VENIPUNCTURE: CPT

## 2020-01-30 PROCEDURE — 80047 BASIC METABLC PNL IONIZED CA: CPT

## 2020-01-30 PROCEDURE — 3017F COLORECTAL CA SCREEN DOC REV: CPT | Performed by: PHYSICIAN ASSISTANT

## 2020-01-30 PROCEDURE — 99213 OFFICE O/P EST LOW 20 MIN: CPT | Performed by: PHYSICIAN ASSISTANT

## 2020-01-30 NOTE — PROGRESS NOTES
HGB 14.2 10/30/2019    HCT 41.5 (L) 10/30/2019    MCV 92 10/30/2019     10/30/2019     BMP:   Lab Results   Component Value Date     10/30/2019     01/31/2019    K 4.9 10/30/2019    K 4.2 01/31/2019     01/31/2019    CO2 22 01/31/2019    BUN 9 01/31/2019    CREATININE 0.8 10/30/2019    CREATININE 0.6 04/08/2019    GLUCOSE 132 01/31/2019    CALCIUM 8.1 01/31/2019      LFT:   Lab Results   Component Value Date    ALT 11 06/03/2019    AST 14 06/03/2019    ALKPHOS 124 06/03/2019    BILITOT 0.5 06/03/2019      MRI Pelvis W WO Contrast 1/23/20  FINDINGS:    There is a stable diverticulum projecting from the right side of the urinary bladder measuring 3.8 x 2.5 x 3.5 cm in longitudinal, transverse and AP dimensions.       There is a persistent however smaller enhancing mass involving the inferior wall of the bladder diverticulum at the junction with the urinary bladder measuring 2.1 x 1.2 x 1.4 cm in longitudinal, transverse and AP dimensions. There is irregularity of the    outer margin of the mass however there is no other enhancement of the fat adjacent to the mass.       There is mild generalized circumferential thickening of the wall the urinary bladder.       There are no pathologically enlarged lymph nodes.       The prostate gland measures 5.2 x 4.3 x 3.7 cm in longitudinal, transverse and AP dimensions and contains a 1.2 cm utricle cyst.       There are sigmoid diverticula without diverticulitis.       There is no free fluid or inflammatory process.       There is no acute musculoskeletal abnormality.               Impression   1. There is a stable diverticulum projecting from the right side of the urinary bladder measuring 3.8 x 2.5 x 3.5 cm in longitudinal, transverse and AP dimensions.       2.  There is a persistent however smaller enhancing mass involving the inferior wall of the bladder diverticulum at the junction with the urinary bladder measuring 2.1 x 1.2 x 1.4 cm in longitudinal, denies hematuria. Hgb stable at 14.2. He has not had follow up with Urology with no planned follow up. Discussed with the patient role of surveillance cystoscopy and Urologic evaluation. He reports he would not like referral at this time but will think about it.    -Return to clinic with Dr. Frankie Proctor in three months   -Labs on RTC: CBC, BMP, LFT, U/A         All patient questions answered. Pt voiced understanding. Patient agreed with treatment plan. Follow up as directed. Patient instructed to call for questions or concerns.      Electronically signed by   Douglass Favre, PA-C

## 2020-01-30 NOTE — PATIENT INSTRUCTIONS
1. Return to clinic with Dr. CAT [facility] in 3 months. 2. CBC, BMP, LFT, U/A on RTC  3. Please call for questions or concerns.

## 2020-01-31 LAB
T4 FREE: 1.84 NG/DL (ref 0.93–1.76)
TSH SERPL DL<=0.05 MIU/L-ACNC: 0.82 UIU/ML (ref 0.4–4.2)

## 2020-03-23 ENCOUNTER — TELEPHONE (OUTPATIENT)
Dept: ONCOLOGY | Age: 75
End: 2020-03-23

## 2020-03-23 PROCEDURE — 99999 URINE RT REFLEX TO CULTURE: CPT | Performed by: PHYSICIAN ASSISTANT

## 2020-03-23 NOTE — TELEPHONE ENCOUNTER
Stated has been doing well without any problems - yesterday noticed blood in urine- states urine dk orange in color- passing a few blood clots-- also occurred today. c/o of burning but states nothing new since scoped last year- it always burns. States these are same symptoms last year when diagnosed- but were worse than now. Calling to see what his next step should be. Not wanting to be scoped by urologists but will if recommended.  Next appt here 4/30

## 2020-03-23 NOTE — TELEPHONE ENCOUNTER
Would recommend the patient is evaluated by Urology for possibly cystoscopy. Please arrange an appointment for the patient. Also, recommend the patient has CBC, BMP and U/A with reflex completed this week. Orders placed. (0) independent

## 2020-03-23 NOTE — TELEPHONE ENCOUNTER
Spoke with patient regarding this encounter and he stated that he will go to [facility] lab on [address] tomorrow and that I will let him know about a Urologist and he voiced understanding

## 2020-03-24 ENCOUNTER — TELEPHONE (OUTPATIENT)
Dept: UROLOGY | Age: 75
End: 2020-03-24

## 2020-03-24 ENCOUNTER — PROCEDURE VISIT (OUTPATIENT)
Dept: UROLOGY | Age: 75
End: 2020-03-24
Payer: MEDICARE

## 2020-03-24 ENCOUNTER — HOSPITAL ENCOUNTER (OUTPATIENT)
Age: 75
Discharge: HOME | End: 2020-03-24
Payer: MEDICARE

## 2020-03-24 VITALS — WEIGHT: 192 LBS | TEMPERATURE: 98.1 F | BODY MASS INDEX: 30.13 KG/M2 | HEIGHT: 67 IN

## 2020-03-24 DIAGNOSIS — R30.0 DYSURIA: ICD-10-CM

## 2020-03-24 DIAGNOSIS — Z92.3 STATUS POST CHEMORADIATION: ICD-10-CM

## 2020-03-24 DIAGNOSIS — R31.0 GROSS HEMATURIA: ICD-10-CM

## 2020-03-24 DIAGNOSIS — Z92.21 STATUS POST CHEMORADIATION: ICD-10-CM

## 2020-03-24 DIAGNOSIS — C67.9 UROTHELIAL CARCINOMA OF BLADDER (HCC): ICD-10-CM

## 2020-03-24 LAB
AMORPHOUS: ABNORMAL
ANION GAP SERPL CALCULATED.3IONS-SCNC: 12 MEQ/L (ref 8–16)
BACTERIA: ABNORMAL /HPF
BASOPHILS # BLD: 0.4 %
BASOPHILS ABSOLUTE: 0 THOU/MM3 (ref 0–0.1)
BILIRUBIN URINE: NEGATIVE
BLOOD, URINE: ABNORMAL
BUN BLDV-MCNC: 14 MG/DL (ref 7–22)
CALCIUM SERPL-MCNC: 10.2 MG/DL (ref 8.5–10.5)
CASTS 2: ABNORMAL /LPF
CASTS UA: ABNORMAL /LPF
CHARACTER, URINE: ABNORMAL
CHLORIDE BLD-SCNC: 102 MEQ/L (ref 98–111)
CO2: 27 MEQ/L (ref 23–33)
COLOR: ABNORMAL
CREAT SERPL-MCNC: 0.7 MG/DL (ref 0.4–1.2)
CRYSTALS, UA: ABNORMAL
EOSINOPHIL # BLD: 2.5 %
EOSINOPHILS ABSOLUTE: 0.2 THOU/MM3 (ref 0–0.4)
EPITHELIAL CELLS, UA: ABNORMAL /HPF
ERYTHROCYTE [DISTWIDTH] IN BLOOD BY AUTOMATED COUNT: 13.5 % (ref 11.5–14.5)
ERYTHROCYTE [DISTWIDTH] IN BLOOD BY AUTOMATED COUNT: 47.9 FL (ref 35–45)
GFR SERPL CREATININE-BSD FRML MDRD: > 90 ML/MIN/1.73M2
GLUCOSE BLD-MCNC: 196 MG/DL (ref 70–108)
GLUCOSE URINE: NEGATIVE MG/DL
HCT VFR BLD CALC: 47.9 % (ref 42–52)
HEMOGLOBIN: 15.4 GM/DL (ref 14–18)
IMMATURE GRANS (ABS): 0.03 THOU/MM3 (ref 0–0.07)
IMMATURE GRANULOCYTES: 0.4 %
KETONES, URINE: NEGATIVE
LEUKOCYTE ESTERASE, URINE: ABNORMAL
LYMPHOCYTES # BLD: 21.8 %
LYMPHOCYTES ABSOLUTE: 1.6 THOU/MM3 (ref 1–4.8)
MCH RBC QN AUTO: 31.1 PG (ref 26–33)
MCHC RBC AUTO-ENTMCNC: 32.2 GM/DL (ref 32.2–35.5)
MCV RBC AUTO: 96.8 FL (ref 80–94)
MISCELLANEOUS 2: ABNORMAL
MONOCYTES # BLD: 7.2 %
MONOCYTES ABSOLUTE: 0.5 THOU/MM3 (ref 0.4–1.3)
MUCUS: ABNORMAL
NITRITE, URINE: NEGATIVE
NUCLEATED RED BLOOD CELLS: 0 /100 WBC
PH UA: 5 (ref 5–9)
PLATELET # BLD: 181 THOU/MM3 (ref 130–400)
PMV BLD AUTO: 9.6 FL (ref 9.4–12.4)
POTASSIUM SERPL-SCNC: 4.9 MEQ/L (ref 3.5–5.2)
PROTEIN UA: 30
RBC # BLD: 4.95 MILL/MM3 (ref 4.7–6.1)
RBC URINE: > 200 /HPF
RENAL EPITHELIAL, UA: ABNORMAL
SEG NEUTROPHILS: 67.7 %
SEGMENTED NEUTROPHILS ABSOLUTE COUNT: 4.9 THOU/MM3 (ref 1.8–7.7)
SODIUM BLD-SCNC: 141 MEQ/L (ref 135–145)
SPECIFIC GRAVITY, URINE: 1.02 (ref 1–1.03)
UROBILINOGEN, URINE: 1 EU/DL (ref 0–1)
WBC # BLD: 7.3 THOU/MM3 (ref 4.8–10.8)
WBC UA: ABNORMAL /HPF
YEAST: ABNORMAL

## 2020-03-24 PROCEDURE — 36415 COLL VENOUS BLD VENIPUNCTURE: CPT

## 2020-03-24 PROCEDURE — 80048 BASIC METABOLIC PNL TOTAL CA: CPT

## 2020-03-24 PROCEDURE — 85025 COMPLETE CBC W/AUTO DIFF WBC: CPT

## 2020-03-24 PROCEDURE — 52000 CYSTOURETHROSCOPY: CPT | Performed by: UROLOGY

## 2020-03-24 PROCEDURE — 81001 URINALYSIS AUTO W/SCOPE: CPT

## 2020-03-24 PROCEDURE — 87086 URINE CULTURE/COLONY COUNT: CPT

## 2020-03-24 NOTE — TELEPHONE ENCOUNTER
Discussed with Dr. Jaquelin Agee if pt can come before 4 pm today he can do cystoscopy. Otherwise next Tuesday.     Thank you

## 2020-03-25 ENCOUNTER — TELEPHONE (OUTPATIENT)
Dept: ONCOLOGY | Age: 75
End: 2020-03-25

## 2020-03-25 ENCOUNTER — TELEPHONE (OUTPATIENT)
Dept: UROLOGY | Age: 75
End: 2020-03-25

## 2020-03-25 NOTE — TELEPHONE ENCOUNTER
Roseannisaiasia Efrain was seen by Dr Divya Gaona on 3/24/20. Discussed scheduling the TurBt with Dr Divya Gaona. Advised patient that the [facility] is reducing the cases being scheduled due to COVID-19. He did not understand the concept. Holding surgery date 5/5/20. Explained if there are cancellations he will be called.

## 2020-03-25 NOTE — TELEPHONE ENCOUNTER
He saw urologists and informed earliest surgery is May 5. Scope revealed tumor in bladder - per patient. He says he can not wait that long. Will try to call office tomorrow and go with a different Md if can perform surgery sooner. He was wondering if Tayler Swan has opinion or connections to move surgery up sooner.

## 2020-03-25 NOTE — TELEPHONE ENCOUNTER
Lab work noted:  Lab Results   Component Value Date    WBC 7.3 03/24/2020    HGB 15.4 03/24/2020    HCT 47.9 03/24/2020    MCV 96.8 (H) 03/24/2020     03/24/2020     Lab Results   Component Value Date     03/24/2020    K 4.9 03/24/2020     03/24/2020    CO2 27 03/24/2020    BUN 14 03/24/2020    CREATININE 0.7 03/24/2020    GLUCOSE 196 03/24/2020    CALCIUM 10.2 03/24/2020         Ref. Range 3/24/2020 11:28   Color, UA Latest Ref Range: STRAW-YELL  DK YELLOW (A)   Glucose, UA Latest Ref Range: NEGATIVE mg/dl NEGATIVE   Bilirubin, Urine Latest Ref Range: NEGATIVE  NEGATIVE   Ketones, Urine Latest Ref Range: NEGATIVE  NEGATIVE   Blood, Urine Latest Ref Range: NEGATIVE  LARGE (A)   pH, UA Latest Ref Range: 5.0 - 9.0  5.0   Protein, UA Latest Ref Range: NEGATIVE  30 (A)   Urobilinogen, Urine Latest Ref Range: 0.0 - 1.0 eu/dl 1.0   Nitrite, Urine Latest Ref Range: NEGATIVE  NEGATIVE   Leukocyte Esterase, Urine Latest Ref Range: NEGATIVE  SMALL (A)       I called the patient to discuss results. Recommended on 3/23/20 that he have evaluation by Urology for cystoscopy. He was able to get in with Dr. Angel Ritchie yesterday on 3/24/20 and had cystoscopy completed in the office. Dr. Angel Ritchie note pending, per patient he states he has a \"small tumor\" in the bladder. No biopsy taken. He states he is awaiting call to schedule surgery for removal.     Instructed the patient to call back if he has not heard to schedule surgery in the next two days. He expressed understanding. No further questions at this time. Will forward above information to Dr. Mark Persaud. Instructed to keep appointment with Dr. Mark Persaud as scheduled on 4/30/20.      Electronically signed by   Angela Gil PA-C on 3/25/2020 at 9:24 AM

## 2020-03-26 ENCOUNTER — TELEPHONE (OUTPATIENT)
Dept: UROLOGY | Age: 75
End: 2020-03-26

## 2020-03-26 LAB — URINE CULTURE REFLEX: NORMAL

## 2020-03-26 NOTE — TELEPHONE ENCOUNTER
Per Dr. Yao Partida patient scheduled for surgery on 03- at approximately 10:30am with an arrival of 8:30am.  Preop orders and instructions given to patient. Surgery consent signed.

## 2020-03-27 ENCOUNTER — HOSPITAL ENCOUNTER (OUTPATIENT)
Age: 75
Discharge: HOME | End: 2020-03-27
Payer: MEDICARE

## 2020-03-27 ENCOUNTER — HOSPITAL ENCOUNTER (OUTPATIENT)
Dept: GENERAL RADIOLOGY | Age: 75
Discharge: HOME | End: 2020-03-27
Payer: MEDICARE

## 2020-03-27 ENCOUNTER — PREP FOR PROCEDURE (OUTPATIENT)
Dept: UROLOGY | Age: 75
End: 2020-03-27

## 2020-03-27 ENCOUNTER — TELEPHONE (OUTPATIENT)
Dept: ONCOLOGY | Age: 75
End: 2020-03-27

## 2020-03-27 LAB
EKG ATRIAL RATE: 86 BPM
EKG P AXIS: 62 DEGREES
EKG P-R INTERVAL: 168 MS
EKG Q-T INTERVAL: 376 MS
EKG QRS DURATION: 90 MS
EKG QTC CALCULATION (BAZETT): 449 MS
EKG R AXIS: 38 DEGREES
EKG T AXIS: 29 DEGREES
EKG VENTRICULAR RATE: 86 BPM

## 2020-03-27 PROCEDURE — 71046 X-RAY EXAM CHEST 2 VIEWS: CPT

## 2020-03-27 PROCEDURE — 93005 ELECTROCARDIOGRAM TRACING: CPT

## 2020-03-27 RX ORDER — VARENICLINE TARTRATE 1 MG/1
TABLET, FILM COATED ORAL DAILY
COMMUNITY
Start: 2020-01-24

## 2020-03-27 RX ORDER — SODIUM CHLORIDE 9 MG/ML
INJECTION, SOLUTION INTRAVENOUS CONTINUOUS
Status: CANCELLED | OUTPATIENT
Start: 2020-03-31

## 2020-03-27 NOTE — PROGRESS NOTES
Cystoscopy Operative Note    Patient:  João Chen  MRN: [id number]  YOB: 1945  Surgeon: Rubi Lovelace MD  Anesthesia: Urethral 2% Xylocaine   Indications: bladder cancer  Position: Supine    Findings:   The patient was prepped and draped in the usual sterile fashion. The flexible cystoscope was advanced through the urethra and into the bladder. The bladder was thoroughly inspected and the following was noted:    Residual Urine: Minimal  Urethra: No abnormalities of the urethra are noted. Prostate: mild lateral lobe hypertrophy  Bladder: large tumor near edge of large diverticulum. mod trabeculation noted. Ureters: Clear efflux from both ureters. Orifices with normal configuration and location. The cystoscope was removed. The patient tolerated the procedure well.   Cystoscopy , TURBT

## 2020-03-31 ENCOUNTER — HOSPITAL ENCOUNTER (OUTPATIENT)
Age: 75
Setting detail: OBSERVATION
Discharge: HOME | End: 2020-04-01
Attending: UROLOGY | Admitting: UROLOGY
Payer: MEDICARE

## 2020-03-31 ENCOUNTER — ANESTHESIA (OUTPATIENT)
Dept: OPERATING ROOM | Age: 75
End: 2020-03-31
Payer: MEDICARE

## 2020-03-31 ENCOUNTER — ANESTHESIA EVENT (OUTPATIENT)
Dept: OPERATING ROOM | Age: 75
End: 2020-03-31
Payer: MEDICARE

## 2020-03-31 VITALS
SYSTOLIC BLOOD PRESSURE: 142 MMHG | OXYGEN SATURATION: 99 % | DIASTOLIC BLOOD PRESSURE: 66 MMHG | RESPIRATION RATE: 1 BRPM

## 2020-03-31 PROBLEM — R31.9 HEMATURIA: Status: ACTIVE | Noted: 2020-03-31

## 2020-03-31 LAB
GLUCOSE BLD-MCNC: 114 MG/DL (ref 70–108)
GLUCOSE BLD-MCNC: 138 MG/DL (ref 70–108)

## 2020-03-31 PROCEDURE — 3700000000 HC ANESTHESIA ATTENDED CARE: Performed by: UROLOGY

## 2020-03-31 PROCEDURE — 82948 REAGENT STRIP/BLOOD GLUCOSE: CPT

## 2020-03-31 PROCEDURE — 6360000002 HC RX W HCPCS: Performed by: ANESTHESIOLOGY

## 2020-03-31 PROCEDURE — 88360 TUMOR IMMUNOHISTOCHEM/MANUAL: CPT

## 2020-03-31 PROCEDURE — 88307 TISSUE EXAM BY PATHOLOGIST: CPT

## 2020-03-31 PROCEDURE — 7100000000 HC PACU RECOVERY - FIRST 15 MIN: Performed by: UROLOGY

## 2020-03-31 PROCEDURE — 2580000003 HC RX 258: Performed by: ANESTHESIOLOGY

## 2020-03-31 PROCEDURE — G0378 HOSPITAL OBSERVATION PER HR: HCPCS

## 2020-03-31 PROCEDURE — 3600000003 HC SURGERY LEVEL 3 BASE: Performed by: UROLOGY

## 2020-03-31 PROCEDURE — 2580000003 HC RX 258: Performed by: NURSE PRACTITIONER

## 2020-03-31 PROCEDURE — 2709999900 HC NON-CHARGEABLE SUPPLY

## 2020-03-31 PROCEDURE — 6360000002 HC RX W HCPCS

## 2020-03-31 PROCEDURE — 2709999900 HC NON-CHARGEABLE SUPPLY: Performed by: UROLOGY

## 2020-03-31 PROCEDURE — 7100000001 HC PACU RECOVERY - ADDTL 15 MIN: Performed by: UROLOGY

## 2020-03-31 PROCEDURE — 3600000013 HC SURGERY LEVEL 3 ADDTL 15MIN: Performed by: UROLOGY

## 2020-03-31 PROCEDURE — 2500000003 HC RX 250 WO HCPCS: Performed by: ANESTHESIOLOGY

## 2020-03-31 PROCEDURE — 2720000010 HC SURG SUPPLY STERILE: Performed by: UROLOGY

## 2020-03-31 PROCEDURE — 6360000002 HC RX W HCPCS: Performed by: NURSE PRACTITIONER

## 2020-03-31 PROCEDURE — 2580000003 HC RX 258

## 2020-03-31 PROCEDURE — 3700000001 HC ADD 15 MINUTES (ANESTHESIA): Performed by: UROLOGY

## 2020-03-31 PROCEDURE — 2580000003 HC RX 258: Performed by: UROLOGY

## 2020-03-31 PROCEDURE — 6370000000 HC RX 637 (ALT 250 FOR IP)

## 2020-03-31 RX ORDER — SODIUM CHLORIDE 0.9 % (FLUSH) 0.9 %
10 SYRINGE (ML) INJECTION PRN
Status: DISCONTINUED | OUTPATIENT
Start: 2020-03-31 | End: 2020-04-01 | Stop reason: HOSPADM

## 2020-03-31 RX ORDER — FENTANYL CITRATE 50 UG/ML
25 INJECTION, SOLUTION INTRAMUSCULAR; INTRAVENOUS EVERY 5 MIN PRN
Status: DISCONTINUED | OUTPATIENT
Start: 2020-03-31 | End: 2020-03-31 | Stop reason: HOSPADM

## 2020-03-31 RX ORDER — MAGNESIUM HYDROXIDE 1200 MG/15ML
LIQUID ORAL CONTINUOUS PRN
Status: COMPLETED | OUTPATIENT
Start: 2020-03-31 | End: 2020-03-31

## 2020-03-31 RX ORDER — ONDANSETRON 2 MG/ML
INJECTION INTRAMUSCULAR; INTRAVENOUS PRN
Status: DISCONTINUED | OUTPATIENT
Start: 2020-03-31 | End: 2020-03-31 | Stop reason: SDUPTHER

## 2020-03-31 RX ORDER — PROMETHAZINE HYDROCHLORIDE 25 MG/ML
6.25 INJECTION, SOLUTION INTRAMUSCULAR; INTRAVENOUS
Status: DISCONTINUED | OUTPATIENT
Start: 2020-03-31 | End: 2020-03-31 | Stop reason: HOSPADM

## 2020-03-31 RX ORDER — SODIUM CHLORIDE 9 MG/ML
INJECTION, SOLUTION INTRAVENOUS CONTINUOUS PRN
Status: DISCONTINUED | OUTPATIENT
Start: 2020-03-31 | End: 2020-03-31 | Stop reason: SDUPTHER

## 2020-03-31 RX ORDER — OXYCODONE HYDROCHLORIDE 5 MG/1
10 TABLET ORAL EVERY 4 HOURS PRN
Status: DISCONTINUED | OUTPATIENT
Start: 2020-03-31 | End: 2020-04-01 | Stop reason: HOSPADM

## 2020-03-31 RX ORDER — SODIUM CHLORIDE 9 MG/ML
INJECTION, SOLUTION INTRAVENOUS CONTINUOUS
Status: DISCONTINUED | OUTPATIENT
Start: 2020-03-31 | End: 2020-04-01 | Stop reason: HOSPADM

## 2020-03-31 RX ORDER — GLYCOPYRROLATE 1 MG/5 ML
SYRINGE (ML) INTRAVENOUS PRN
Status: DISCONTINUED | OUTPATIENT
Start: 2020-03-31 | End: 2020-03-31 | Stop reason: SDUPTHER

## 2020-03-31 RX ORDER — NEOSTIGMINE METHYLSULFATE 5 MG/5 ML
SYRINGE (ML) INTRAVENOUS PRN
Status: DISCONTINUED | OUTPATIENT
Start: 2020-03-31 | End: 2020-03-31 | Stop reason: SDUPTHER

## 2020-03-31 RX ORDER — PROPOFOL 10 MG/ML
INJECTION, EMULSION INTRAVENOUS PRN
Status: DISCONTINUED | OUTPATIENT
Start: 2020-03-31 | End: 2020-03-31 | Stop reason: SDUPTHER

## 2020-03-31 RX ORDER — ROCURONIUM BROMIDE 10 MG/ML
INJECTION, SOLUTION INTRAVENOUS PRN
Status: DISCONTINUED | OUTPATIENT
Start: 2020-03-31 | End: 2020-03-31 | Stop reason: SDUPTHER

## 2020-03-31 RX ORDER — SODIUM CHLORIDE 0.9 % (FLUSH) 0.9 %
10 SYRINGE (ML) INJECTION EVERY 12 HOURS SCHEDULED
Status: DISCONTINUED | OUTPATIENT
Start: 2020-03-31 | End: 2020-04-01 | Stop reason: HOSPADM

## 2020-03-31 RX ORDER — ATROPA BELLADONNA AND OPIUM 16.2; 3 MG/1; MG/1
SUPPOSITORY RECTAL
Status: COMPLETED
Start: 2020-03-31 | End: 2020-03-31

## 2020-03-31 RX ORDER — ATROPA BELLADONNA AND OPIUM 16.2; 3 MG/1; MG/1
30 SUPPOSITORY RECTAL ONCE
Status: COMPLETED | OUTPATIENT
Start: 2020-03-31 | End: 2020-03-31

## 2020-03-31 RX ORDER — OXYCODONE HYDROCHLORIDE 5 MG/1
5 TABLET ORAL EVERY 4 HOURS PRN
Status: DISCONTINUED | OUTPATIENT
Start: 2020-03-31 | End: 2020-04-01 | Stop reason: HOSPADM

## 2020-03-31 RX ORDER — ONDANSETRON 2 MG/ML
4 INJECTION INTRAMUSCULAR; INTRAVENOUS
Status: DISCONTINUED | OUTPATIENT
Start: 2020-03-31 | End: 2020-03-31 | Stop reason: HOSPADM

## 2020-03-31 RX ORDER — FENTANYL CITRATE 50 UG/ML
INJECTION, SOLUTION INTRAMUSCULAR; INTRAVENOUS PRN
Status: DISCONTINUED | OUTPATIENT
Start: 2020-03-31 | End: 2020-03-31 | Stop reason: SDUPTHER

## 2020-03-31 RX ORDER — PROMETHAZINE HYDROCHLORIDE 25 MG/1
12.5 TABLET ORAL EVERY 6 HOURS PRN
Status: DISCONTINUED | OUTPATIENT
Start: 2020-03-31 | End: 2020-04-01 | Stop reason: HOSPADM

## 2020-03-31 RX ORDER — ACETAMINOPHEN 325 MG/1
650 TABLET ORAL EVERY 4 HOURS PRN
Status: DISCONTINUED | OUTPATIENT
Start: 2020-03-31 | End: 2020-04-01 | Stop reason: HOSPADM

## 2020-03-31 RX ORDER — ONDANSETRON 2 MG/ML
4 INJECTION INTRAMUSCULAR; INTRAVENOUS EVERY 6 HOURS PRN
Status: DISCONTINUED | OUTPATIENT
Start: 2020-03-31 | End: 2020-04-01 | Stop reason: HOSPADM

## 2020-03-31 RX ORDER — LABETALOL 20 MG/4 ML (5 MG/ML) INTRAVENOUS SYRINGE
5 EVERY 10 MIN PRN
Status: DISCONTINUED | OUTPATIENT
Start: 2020-03-31 | End: 2020-03-31 | Stop reason: HOSPADM

## 2020-03-31 RX ORDER — FENTANYL CITRATE 50 UG/ML
50 INJECTION, SOLUTION INTRAMUSCULAR; INTRAVENOUS EVERY 5 MIN PRN
Status: DISCONTINUED | OUTPATIENT
Start: 2020-03-31 | End: 2020-03-31 | Stop reason: HOSPADM

## 2020-03-31 RX ADMIN — PROPOFOL 150 MG: 10 INJECTION, EMULSION INTRAVENOUS at 12:15

## 2020-03-31 RX ADMIN — FENTANYL CITRATE 100 MCG: 50 INJECTION, SOLUTION INTRAMUSCULAR; INTRAVENOUS at 12:15

## 2020-03-31 RX ADMIN — SODIUM CHLORIDE: 9 INJECTION, SOLUTION INTRAVENOUS at 09:18

## 2020-03-31 RX ADMIN — FENTANYL CITRATE 50 MCG: 50 INJECTION, SOLUTION INTRAMUSCULAR; INTRAVENOUS at 12:28

## 2020-03-31 RX ADMIN — DEXTROSE MONOHYDRATE 2 G: 50 INJECTION, SOLUTION INTRAVENOUS at 12:20

## 2020-03-31 RX ADMIN — Medication 0.8 MG: at 12:58

## 2020-03-31 RX ADMIN — ONDANSETRON HYDROCHLORIDE 4 MG: 4 INJECTION, SOLUTION INTRAMUSCULAR; INTRAVENOUS at 12:24

## 2020-03-31 RX ADMIN — SODIUM CHLORIDE: 9 INJECTION, SOLUTION INTRAVENOUS at 16:39

## 2020-03-31 RX ADMIN — Medication 5 MG: at 12:58

## 2020-03-31 RX ADMIN — SODIUM CHLORIDE: 9 INJECTION, SOLUTION INTRAVENOUS at 13:03

## 2020-03-31 RX ADMIN — HYDROMORPHONE HYDROCHLORIDE 0.5 MG: 1 INJECTION, SOLUTION INTRAMUSCULAR; INTRAVENOUS; SUBCUTANEOUS at 13:56

## 2020-03-31 RX ADMIN — ATROPA BELLADONNA AND OPIUM 30 MG: 16.2; 3 SUPPOSITORY RECTAL at 13:54

## 2020-03-31 RX ADMIN — ROCURONIUM BROMIDE 30 MG: 10 INJECTION INTRAVENOUS at 12:32

## 2020-03-31 RX ADMIN — DEXTROSE MONOHYDRATE 2 G: 50 INJECTION, SOLUTION INTRAVENOUS at 20:23

## 2020-03-31 RX ADMIN — SODIUM CHLORIDE: 9 INJECTION, SOLUTION INTRAVENOUS at 12:11

## 2020-03-31 RX ADMIN — HYDROMORPHONE HYDROCHLORIDE 0.5 MG: 1 INJECTION, SOLUTION INTRAMUSCULAR; INTRAVENOUS; SUBCUTANEOUS at 13:34

## 2020-03-31 ASSESSMENT — PULMONARY FUNCTION TESTS
PIF_VALUE: 2
PIF_VALUE: 16
PIF_VALUE: 15
PIF_VALUE: 15
PIF_VALUE: 3
PIF_VALUE: 15
PIF_VALUE: 15
PIF_VALUE: 3
PIF_VALUE: 15
PIF_VALUE: 2
PIF_VALUE: 19
PIF_VALUE: 3
PIF_VALUE: 15
PIF_VALUE: 3
PIF_VALUE: 3
PIF_VALUE: 15
PIF_VALUE: 1
PIF_VALUE: 15
PIF_VALUE: 3
PIF_VALUE: 2
PIF_VALUE: 2
PIF_VALUE: 3
PIF_VALUE: 15
PIF_VALUE: 15
PIF_VALUE: 1
PIF_VALUE: 1
PIF_VALUE: 15
PIF_VALUE: 3
PIF_VALUE: 1
PIF_VALUE: 1
PIF_VALUE: 3
PIF_VALUE: 14
PIF_VALUE: 15
PIF_VALUE: 1
PIF_VALUE: 15
PIF_VALUE: 15
PIF_VALUE: 3
PIF_VALUE: 3
PIF_VALUE: 2
PIF_VALUE: 15
PIF_VALUE: 15
PIF_VALUE: 3
PIF_VALUE: 15
PIF_VALUE: 15
PIF_VALUE: 3
PIF_VALUE: 3
PIF_VALUE: 2
PIF_VALUE: 15
PIF_VALUE: 15
PIF_VALUE: 18
PIF_VALUE: 14
PIF_VALUE: 15
PIF_VALUE: 1
PIF_VALUE: 1

## 2020-03-31 ASSESSMENT — PAIN SCALES - GENERAL
PAINLEVEL_OUTOF10: 8
PAINLEVEL_OUTOF10: 3
PAINLEVEL_OUTOF10: 8
PAINLEVEL_OUTOF10: 3

## 2020-03-31 ASSESSMENT — PAIN - FUNCTIONAL ASSESSMENT: PAIN_FUNCTIONAL_ASSESSMENT: 0-10

## 2020-03-31 ASSESSMENT — PAIN DESCRIPTION - PROGRESSION: CLINICAL_PROGRESSION: GRADUALLY IMPROVING

## 2020-03-31 NOTE — ANESTHESIA PRE PROCEDURE
BMI.    CBC:   Lab Results   Component Value Date    WBC 7.3 03/24/2020    RBC 4.95 03/24/2020    HGB 15.4 03/24/2020    HCT 47.9 03/24/2020    MCV 96.8 03/24/2020    RDW 13.0 01/30/2020     03/24/2020       CMP:   Lab Results   Component Value Date     03/24/2020    K 4.9 03/24/2020     03/24/2020    CO2 27 03/24/2020    BUN 14 03/24/2020    CREATININE 0.7 03/24/2020    LABGLOM >90 03/24/2020    GLUCOSE 196 03/24/2020    PROT 6.4 06/03/2019    CALCIUM 10.2 03/24/2020    BILITOT 0.5 06/03/2019    ALKPHOS 124 06/03/2019    AST 14 06/03/2019    ALT 11 06/03/2019       POC Tests:   No results for input(s): POCGLU, POCNA, POCK, POCCL, POCBUN, POCHEMO, POCHCT in the last 72 hours. Coags: No results found for: PROTIME, INR, APTT    HCG (If Applicable): No results found for: PREGTESTUR, PREGSERUM, HCG, HCGQUANT     ABGs: No results found for: PHART, PO2ART, AXQ2CRQ, LHJ7KEW, BEART, [id number]     Type & Screen (If Applicable):  No results found for: LABABO, [address]    Anesthesia Evaluation  Patient summary reviewed and Nursing notes reviewed no history of anesthetic complications:   Airway: Mallampati: II  TM distance: <3 FB   Neck ROM: full  Mouth opening: > = 3 FB Dental:          Pulmonary: breath sounds clear to auscultation  (+) sleep apnea:            Patient did not smoke on day of surgery. Cardiovascular:  Exercise tolerance: good (>4 METS),         ECG reviewed  Rhythm: regular  Rate: normal                    Neuro/Psych:               GI/Hepatic/Renal:             Endo/Other:    (+) Diabeteswell controlled, , .          Pt had no PAT visit       Abdominal:       Abdomen: soft. Vascular:                                          Anesthesia Plan      general     ASA 2       Induction: intravenous. MIPS: Postoperative opioids intended and Prophylactic antiemetics administered. Anesthetic plan and risks discussed with patient.                       Sal Soliz MD

## 2020-03-31 NOTE — H&P
Cora Russell MD  History and Physical    Patient:  Nathan Poag  MRN: [id number]  YOB: 1945    HISTORY OF PRESENT ILLNESS:     The patient is a 76 y.o. male who presents with bladder tumor. Here for procedure. Patient's old records, notes and chart reviewed and summarized above. Cora Russell MD independently reviewed the images and verified the radiology reports from:    Xr Chest Standard (2 Vw)    Result Date: 3/27/2020  PROCEDURE: XR CHEST (2 VW) CLINICAL INFORMATION: Preop; bladder neoplasm; hematuria. COMPARISON: 12/27/2018. TECHNIQUE: PA and lateral views of the chest performed. FINDINGS: POSTSURGICAL CHANGES: None. LINES/TUBES/MECHANICAL DEVICES: None. TRACHEA/HEART/MEDIASTINUM/HILUM: 1. There is mild atheromatous calcification at the aortic arch. LUNG VELA: Normal. OTHER: None. PNEUMOTHORAX: None. OSSEOUS STRUCTURES: 1. No acute osseous abnormality. 2. The bony structures are osteopenic. 3. There are small endplate spurs at several levels along the spine. 1. No acute cardiopulmonary process. **This report has been created using voice recognition software. It may contain minor errors which are inherent in voice recognition technology. ** Final report electronically signed by Dr. Juan Metcalf on 3/27/2020 1:49 PM        Past Medical History:    Past Medical History:   Diagnosis Date    Anxiety     Cancer ([address]     bladder chemo    Cataract     LEFT EYE    Diabetes mellitus ([address]     Glaucoma     LEFT EYE    History of hematuria     Hyperlipidemia    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     SINCE CHILDHOOD    Thyroid disease     Type 2 diabetes mellitus without complication ([address]        Past Surgical History:    Past Surgical History:   Procedure Laterality Date    CYSTOSCOPY N/A 1/30/2019    TRANSURETHRAL RESECTION BLADDER TUMOR, CYSTOLITHOLAPAXY performed by Celestino Miranda MD at [address] [place] [address] as kid

## 2020-04-01 VITALS
HEART RATE: 78 BPM | BODY MASS INDEX: 29.79 KG/M2 | DIASTOLIC BLOOD PRESSURE: 58 MMHG | HEIGHT: 67 IN | WEIGHT: 189.8 LBS | RESPIRATION RATE: 18 BRPM | OXYGEN SATURATION: 90 % | TEMPERATURE: 98.2 F | SYSTOLIC BLOOD PRESSURE: 124 MMHG

## 2020-04-01 LAB
ANION GAP SERPL CALCULATED.3IONS-SCNC: 12 MEQ/L (ref 8–16)
BASOPHILS # BLD: 0.4 %
BASOPHILS ABSOLUTE: 0 THOU/MM3 (ref 0–0.1)
BUN BLDV-MCNC: 11 MG/DL (ref 7–22)
CALCIUM SERPL-MCNC: 8.4 MG/DL (ref 8.5–10.5)
CHLORIDE BLD-SCNC: 106 MEQ/L (ref 98–111)
CO2: 24 MEQ/L (ref 23–33)
CREAT SERPL-MCNC: 0.8 MG/DL (ref 0.4–1.2)
EOSINOPHIL # BLD: 1.7 %
EOSINOPHILS ABSOLUTE: 0.1 THOU/MM3 (ref 0–0.4)
ERYTHROCYTE [DISTWIDTH] IN BLOOD BY AUTOMATED COUNT: 13.7 % (ref 11.5–14.5)
ERYTHROCYTE [DISTWIDTH] IN BLOOD BY AUTOMATED COUNT: 49.6 FL (ref 35–45)
GFR SERPL CREATININE-BSD FRML MDRD: > 90 ML/MIN/1.73M2
GLUCOSE BLD-MCNC: 131 MG/DL (ref 70–108)
HCT VFR BLD CALC: 40 % (ref 42–52)
HEMOGLOBIN: 12.6 GM/DL (ref 14–18)
IMMATURE GRANS (ABS): 0.03 THOU/MM3 (ref 0–0.07)
IMMATURE GRANULOCYTES: 0.4 %
LYMPHOCYTES # BLD: 15.7 %
LYMPHOCYTES ABSOLUTE: 1.3 THOU/MM3 (ref 1–4.8)
MCH RBC QN AUTO: 31 PG (ref 26–33)
MCHC RBC AUTO-ENTMCNC: 31.5 GM/DL (ref 32.2–35.5)
MCV RBC AUTO: 98.3 FL (ref 80–94)
MONOCYTES # BLD: 7.1 %
MONOCYTES ABSOLUTE: 0.6 THOU/MM3 (ref 0.4–1.3)
NUCLEATED RED BLOOD CELLS: 0 /100 WBC
PLATELET # BLD: 141 THOU/MM3 (ref 130–400)
PMV BLD AUTO: 9.9 FL (ref 9.4–12.4)
POTASSIUM SERPL-SCNC: 4.2 MEQ/L (ref 3.5–5.2)
RBC # BLD: 4.07 MILL/MM3 (ref 4.7–6.1)
SEG NEUTROPHILS: 74.7 %
SEGMENTED NEUTROPHILS ABSOLUTE COUNT: 6.1 THOU/MM3 (ref 1.8–7.7)
SODIUM BLD-SCNC: 142 MEQ/L (ref 135–145)
WBC # BLD: 8.1 THOU/MM3 (ref 4.8–10.8)

## 2020-04-01 PROCEDURE — 85025 COMPLETE CBC W/AUTO DIFF WBC: CPT

## 2020-04-01 PROCEDURE — G0378 HOSPITAL OBSERVATION PER HR: HCPCS

## 2020-04-01 PROCEDURE — 94760 N-INVAS EAR/PLS OXIMETRY 1: CPT

## 2020-04-01 PROCEDURE — 99214 OFFICE O/P EST MOD 30 MIN: CPT | Performed by: NURSE PRACTITIONER

## 2020-04-01 PROCEDURE — 2709999900 HC NON-CHARGEABLE SUPPLY

## 2020-04-01 PROCEDURE — 80048 BASIC METABOLIC PNL TOTAL CA: CPT

## 2020-04-01 PROCEDURE — 6360000002 HC RX W HCPCS: Performed by: NURSE PRACTITIONER

## 2020-04-01 PROCEDURE — 36415 COLL VENOUS BLD VENIPUNCTURE: CPT

## 2020-04-01 PROCEDURE — 2580000003 HC RX 258: Performed by: NURSE PRACTITIONER

## 2020-04-01 RX ORDER — TRAMADOL HYDROCHLORIDE 50 MG/1
50 TABLET ORAL EVERY 6 HOURS PRN
Qty: 12 TABLET | Refills: 0 | Status: SHIPPED | OUTPATIENT
Start: 2020-04-01 | End: 2020-04-04

## 2020-04-01 RX ORDER — CIPROFLOXACIN 500 MG/1
500 TABLET, FILM COATED ORAL 2 TIMES DAILY
Qty: 6 TABLET | Refills: 0 | Status: SHIPPED | OUTPATIENT
Start: 2020-04-01 | End: 2020-04-04

## 2020-04-01 RX ADMIN — DEXTROSE MONOHYDRATE 2 G: 50 INJECTION, SOLUTION INTRAVENOUS at 03:37

## 2020-04-01 RX ADMIN — SODIUM CHLORIDE: 9 INJECTION, SOLUTION INTRAVENOUS at 05:26

## 2020-04-01 ASSESSMENT — PAIN SCALES - GENERAL
PAINLEVEL_OUTOF10: 0
PAINLEVEL_OUTOF10: 0

## 2020-04-01 NOTE — PLAN OF CARE
Problem: Pain:  Goal: Pain level will decrease  Description: Pain level will decrease  Outcome: Ongoing  Note: Pain Assessment: 0-10  Pain Level: 3       Is pain goal met at this time? Yes             Problem:   Goal: Adequate urinary output  Outcome: Ongoing  Note: Patient has celis catheter with CBI running. CBI running at slow rate. Urine pink in color. Care plan reviewed with patient. Patient verbalizes understanding of the plan of care and contribute to goal setting.

## 2020-04-01 NOTE — PROGRESS NOTES
1311 Pt transferred to PACU see flow sheet for assessment,   1354 Pt complaining of pain to his penis, lower abdomen. Order received for B&O from ROBE Sinclair NP. See mar.    1420 Report called to 5K CN.  1425 Pt transferred to [facility]
Covid 19 questions completed.
Following instructions given to patient, who states understanding:    NPO after midnight  [employer] and 's license  Wear comfortable clean clothing  Do not bring jewelry   Shower night before and morning of surgery with a liquid antibacterial soap  Bring medications in original bottles  Follow all instructions given by your physician   needed at discharge  Call [phone] for any questions  Report to SDS on 2nd floor  If you would become ill prior to surgery, please call the surgeon  No visitors at this time - only a  is allowed to accompany the patient
In preparation for their surgical procedure above patient was screened for Obstructive Sleep Apnea (CHETNA) using the STOP-Bang Questionnaire by the Pre-Admission Testing department. This is a pre-surgical screening tool for patient safety and serves as a recommendation, this WILL NOT cause cancellation of surgery. STOP-Bang Questionnaire  * Do you currently see a pulmonologist?  No     If yes STOP, do not complete. Patient follows with DrKeon     1.  Do you snore loudly (able to be heard in the next room)? Yes    2. Do you often feel tired or sleepy during the daytime? No       3. Has anyone ever told you that you stop breathing during your sleep? No    4. Do you have or are you being treated for high blood pressure? No      5. BMI more than 35? BMI (Calculated): 34.5        No    6. Age over 48 years? 76 y.o. Yes    7. Neck Circumference greater than 17 inches for male or 16 inches for female? Measured           (visits only)            Not Applicable    8. Gender Male? Yes      TOTAL SCORE: 3    CHETNA - Low Risk : Yes to 0 - 2 questions  CHETNA - Intermediate Risk : Yes to 3 - 4 questions  CHETNA - High Risk : Yes to 5 - 8 questions    Adapted from:   STOP Questionnaire: A Tool to Screen Patients for Obstructive Sleep Apnea   JOÃO Plascencia.C.P.C., Bobby Luong M.B.B.S., Magnolia Hernandez M.D., [place]. Socorro Hernandez, Ph.D., VOLODYMYR Schroeder.B.B.S., VOLODYMYR Bassett.Sc., Geovany Sprague M.D., [place]ley Chamber. MALVIN Thacker.P.C.    Anesthesiology 2008; [id number] Copyright 2008, the [address] of Anesthesiologists, [facility].   ----------------------------------------------------------------------------------------------------------------
partner violence     Fear of current or ex partner: Not on file     Emotionally abused: Not on file     Physically abused: Not on file     Forced sexual activity: Not on file   Other Topics Concern    Not on file   Social History Narrative    Not on file     Family History   Problem Relation Age of Onset    Other Mother         BRAIN TUMOR    Stroke Mother     Heart Disease Father     High Blood Pressure Father     Cancer Sister     Diabetes Neg Hx      No Known Allergies      Constitutional: Alert and oriented times x3, no acute distress, and cooperative to examination with appropriate mood and affect. HEENT:   Head:         Normocephalic and atraumatic. Mucous membranes are normal.   Eyes:         EOM are normal. No scleral icterus. Nose:    The external appearance of the nose is normal  Ears: The ears appear normal to external inspection. Cardiovascular:       Normal rate, regular rhythm. Pulmonary/Chest:  Normal respiratory rate and rhthym. No use of accessory muscles. Lungs diminished bilaterally. Abdominal:          Soft. No tenderness. Active bowel sounds. Genitalia:    Gutierrez catheter draining pinkish-yellow urine, CBI running slowly    Musculoskeletal:    Normal range of motion. He exhibits no edema or tenderness of lower extremities. Extremities:    No cyanosis, clubbing, or edema present. Neurological:    Alert and oriented.      Labs:  WBC:    Lab Results   Component Value Date    WBC 8.1 04/01/2020     Hemoglobin/Hematocrit:    Lab Results   Component Value Date    HGB 12.6 04/01/2020    HCT 40.0 04/01/2020     BMP:    Lab Results   Component Value Date     04/01/2020    K 4.2 04/01/2020     04/01/2020    CO2 24 04/01/2020    BUN 11 04/01/2020    LABALBU 4.0 06/03/2019    CREATININE 0.8 04/01/2020    CALCIUM 8.4 04/01/2020    LABGLOM >90 04/01/2020       Impression:  Bladder Tumor  Hematuria  S/p TURBT 3/31/20      Plan:    POD # 1 TURBT and removal of bladder

## 2020-04-01 NOTE — DISCHARGE SUMMARY
Patient Identification  Lewis Gill is a 76 y.o. male. :  1945  Admit Date:  3/31/2020    Discharge date: 20                                    Disposition: home    Discharge Diagnoses:   Patient Active Problem List   Diagnosis    Bladder tumor    Postoperative state    Bladder calculi    Gross hematuria    Malignant neoplasm of overlapping sites of bladder [facility]    Hematuria       Consults: none    Surgery: Cystoscopy, TURBT, removal of bladder stone with Dr. Jory Shabazz    Patient Instructions: Activity: no heavy lifting, pushing, pulling for 6 weeks, no driving while on analgesics. Diet: As tolerated  Follow-up with Dr. Jory Shabazz Friday. Hospital course: Patient underwent Cystoscopy, TURBT, removal of bladder stone with no complications. Post-operatively he remained stable. Patient is tolerating diet, pain is minimal, ambulating well with assistance, having flatus.       Time spent for discharge 30 min    Electronically signed by WILLIAN Briceno CNP on 2020 at 11:40 AM

## 2020-04-01 NOTE — CARE COORDINATION
DISCHARGE PLANNING EVALUATION: OP/OBSERVATION        4/1/20, 10:49 AM EDT    Yobani Siddiqui       Admitted from: PACU 3/31/2020/ 0805   Location: [facility]- Reason for admit: Hematuria [R31.9]  Postoperative state [Z98.890]   Admit order signed?: yes    Procedure: 3/31/2020 CYSTOSCOPY WITH TRANSURETHRAL RESECTION OF BLADDER TUMOR (N/A )    Pertinent Info/Orders/Treatment Plan:  IV fluids, prn Tylenol, Roxicodone, Phenergan and Zofran, general diet, ambulate, SCD's. PCP: Hanna Saravia, WILLIAN - CNP    Patient Goals/Plan/Treatment Preferences: Met with Cherylin Olszewski. He is from home alone. He verifies he has insurance, a PCP, he can afford his medications, will have transportation to home at discharge, and his ADL's and nutritional needs are met. Cherylin Olszewski denies a need for services or DME at discharge. He states he had a celis catheter in place over a year ago and had no difficulties. Transportation/Food Security/Housekeeping Addressed:  No issues identified. Cherylin Olszewski is discharged with no services added/requested. 4/2/20, 7:59 AM EDT    Patient goals/plan/ treatment preferences discussed by  and . Patient goals/plan/ treatment preferences reviewed with patient/ family. Patient/ family verbalize understanding of discharge plan and are in agreement with goal/plan/treatment preferences. Understanding was demonstrated using the teach back method. AVS provided by RN at time of discharge, which includes all necessary medical information pertaining to the patients current course of illness, treatment, post-discharge goals of care, and treatment preferences.

## 2020-04-03 ENCOUNTER — TELEPHONE (OUTPATIENT)
Dept: ONCOLOGY | Age: 75
End: 2020-04-03

## 2020-04-03 ENCOUNTER — OFFICE VISIT (OUTPATIENT)
Dept: UROLOGY | Age: 75
End: 2020-04-03
Payer: MEDICARE

## 2020-04-03 VITALS — TEMPERATURE: 98.4 F

## 2020-04-03 PROCEDURE — G8427 DOCREV CUR MEDS BY ELIG CLIN: HCPCS | Performed by: UROLOGY

## 2020-04-03 PROCEDURE — 4040F PNEUMOC VAC/ADMIN/RCVD: CPT | Performed by: UROLOGY

## 2020-04-03 PROCEDURE — 99213 OFFICE O/P EST LOW 20 MIN: CPT | Performed by: UROLOGY

## 2020-04-03 PROCEDURE — 3017F COLORECTAL CA SCREEN DOC REV: CPT | Performed by: UROLOGY

## 2020-04-03 PROCEDURE — 4004F PT TOBACCO SCREEN RCVD TLK: CPT | Performed by: UROLOGY

## 2020-04-03 PROCEDURE — G8417 CALC BMI ABV UP PARAM F/U: HCPCS | Performed by: UROLOGY

## 2020-04-03 PROCEDURE — 1123F ACP DISCUSS/DSCN MKR DOCD: CPT | Performed by: UROLOGY

## 2020-04-05 NOTE — PROGRESS NOTES
bladder surgery. He agreed to concurrent chemoradiation with understanding that outcome is inferior to neoadjuvant chemo and surgery. He started concurrent chemoradiation on March 18, 2019 and completed on May 15, 2019. Overall he tolerated treatment with cisplatin well. He had MRI of the pelvis done approximately 4 weeks from completion of concurrent chemoradiation, it showed nice response to treatment. He had cystoscopy in August 2019 that did not show residual tumor. Requested/reviewed records from Suda, APRN - CNP office and/or outside physician/EMR    (Patient's old records have been requested, reviewed and pertinent findings summarized in today's note.)    Procedures Today: N/A    Last several PSA's:  No results found for: PSA    Last total testosterone:  No results found for: TESTOSTERONE    Urinalysis today:  No results found for this visit on 04/03/20. Last BUN and creatinine:  Lab Results   Component Value Date    BUN 11 04/01/2020     Lab Results   Component Value Date    CREATININE 0.8 04/01/2020       Imaging Reviewed during this Office Visit:   Tk Mahajan MD independently reviewed the images and verified the radiology reports from:    Xr Chest Standard (2 Vw)    Result Date: 3/27/2020  PROCEDURE: XR CHEST (2 VW) CLINICAL INFORMATION: Preop; bladder neoplasm; hematuria. COMPARISON: 12/27/2018. TECHNIQUE: PA and lateral views of the chest performed. FINDINGS: POSTSURGICAL CHANGES: None. LINES/TUBES/MECHANICAL DEVICES: None. TRACHEA/HEART/MEDIASTINUM/HILUM: 1. There is mild atheromatous calcification at the aortic arch. LUNG VELA: Normal. OTHER: None. PNEUMOTHORAX: None. OSSEOUS STRUCTURES: 1. No acute osseous abnormality. 2. The bony structures are osteopenic. 3. There are small endplate spurs at several levels along the spine. 1. No acute cardiopulmonary process. **This report has been created using voice recognition software.   It may contain minor errors which are daily      metFORMIN (GLUCOPHAGE) 500 MG tablet Take by mouth 4 times daily 2 tab      levothyroxine (SYNTHROID) 125 MCG tablet Take 125 mcg by mouth Daily      prednisoLONE acetate (PRED FORTE) 1 % ophthalmic suspension 1 drop 4 times daily      timolol (BETIMOL) 0.5 % ophthalmic solution 1 drop 2 times daily      brimonidine (ALPHAGAN) 0.2 % ophthalmic solution 1 drop 3 times daily      cyclopentolate (CYCLOGYL) 1 % ophthalmic solution 1 drop once         Patient has no known allergies. Social History     Tobacco Use   Smoking Status Current Every Day Smoker    Packs/day: 1.00    Years: 53.00    Pack years: 53.00    Types: Cigarettes   Smokeless Tobacco Never Used   Tobacco Comment    depending on tumor analysis      (If patient a smoker, smoking cessation counseling offered)   Social History     Substance and Sexual Activity   Alcohol Use No    Comment: not in 40 years       REVIEW OF SYSTEMS:  Constitutional: negative  Eyes: negative  Respiratory: negative  Cardiovascular: negative  Gastrointestinal: negative  Genitourinary: see HPI  Musculoskeletal: negative  Skin: negative   Neurological: negative  Hematological/Lymphatic: negative  Psychological: negative          Physical Exam:    This a 76 y.o. male  Vitals:    04/03/20 1259   Temp: 98.4 °F (36.9 °C)     There is no height or weight on file to calculate BMI. Constitutional: Patient in no acute distress; Gutierrez draining dark urine    Assessment and Plan        1. Malignant neoplasm of urinary bladder, unspecified site ([address]    2. Bladder stones    3. Bladder diverticulum               Plan:      Catheter removed in the office today  Discussed need for cystectomy  Patient will consider options  Follow up in one month for video visit  I have personally verified, reviewed, released, signed, authenticated, authorized, confirmed,finalized, and approved the actions of the [address] [place] [address].          Prescriptions Ordered:  No orders of the defined types were placed

## 2020-04-06 ENCOUNTER — TELEPHONE (OUTPATIENT)
Dept: ONCOLOGY | Age: 75
End: 2020-04-06

## 2020-04-08 ENCOUNTER — OFFICE VISIT (OUTPATIENT)
Dept: ONCOLOGY | Age: 75
End: 2020-04-08
Payer: MEDICARE

## 2020-04-08 ENCOUNTER — HOSPITAL ENCOUNTER (OUTPATIENT)
Dept: INFUSION THERAPY | Age: 75
Discharge: HOME | End: 2020-04-08
Payer: MEDICARE

## 2020-04-08 VITALS
DIASTOLIC BLOOD PRESSURE: 60 MMHG | SYSTOLIC BLOOD PRESSURE: 126 MMHG | TEMPERATURE: 97.9 F | BODY MASS INDEX: 29.63 KG/M2 | OXYGEN SATURATION: 96 % | RESPIRATION RATE: 20 BRPM | HEIGHT: 67 IN | WEIGHT: 188.8 LBS | HEART RATE: 68 BPM

## 2020-04-08 DIAGNOSIS — Z92.3 STATUS POST CHEMORADIATION: ICD-10-CM

## 2020-04-08 DIAGNOSIS — C67.9 UROTHELIAL CARCINOMA OF BLADDER (HCC): ICD-10-CM

## 2020-04-08 DIAGNOSIS — Z92.21 STATUS POST CHEMORADIATION: ICD-10-CM

## 2020-04-08 LAB
ALBUMIN SERPL-MCNC: 3.8 G/DL (ref 3.5–5.1)
ALP BLD-CCNC: 101 U/L (ref 38–126)
ALT SERPL-CCNC: 14 U/L (ref 11–66)
ANION GAP SERPL CALCULATED.3IONS-SCNC: 12 MEQ/L (ref 8–16)
AST SERPL-CCNC: 18 U/L (ref 5–40)
BASOPHILS # BLD: 0.4 %
BASOPHILS ABSOLUTE: 0 THOU/MM3 (ref 0–0.1)
BILIRUB SERPL-MCNC: 0.6 MG/DL (ref 0.3–1.2)
BILIRUBIN DIRECT: < 0.2 MG/DL (ref 0–0.3)
BUN BLDV-MCNC: 19 MG/DL (ref 7–22)
CALCIUM SERPL-MCNC: 10.1 MG/DL (ref 8.5–10.5)
CHLORIDE BLD-SCNC: 99 MEQ/L (ref 98–111)
CO2: 28 MEQ/L (ref 23–33)
CREAT SERPL-MCNC: 0.7 MG/DL (ref 0.4–1.2)
EOSINOPHIL # BLD: 2.6 %
EOSINOPHILS ABSOLUTE: 0.2 THOU/MM3 (ref 0–0.4)
GFR SERPL CREATININE-BSD FRML MDRD: > 90 ML/MIN/1.73M2
GLUCOSE BLD-MCNC: 105 MG/DL (ref 70–108)
HCT VFR BLD CALC: 41.6 % (ref 42–52)
HEMOGLOBIN: 14.2 GM/DL (ref 14–18)
IMMATURE GRANS (ABS): 0.04 THOU/MM3 (ref 0–0.07)
IMMATURE GRANULOCYTES: 0.5 %
LYMPHOCYTES # BLD: 22.5 %
LYMPHOCYTES ABSOLUTE: 1.7 THOU/MM3 (ref 1–4.8)
MCH RBC QN AUTO: 31.8 PG (ref 27–31)
MCHC RBC AUTO-ENTMCNC: 34.2 GM/DL (ref 33–37)
MCV RBC AUTO: 93 FL (ref 80–94)
MONOCYTES # BLD: 8.3 %
MONOCYTES ABSOLUTE: 0.6 THOU/MM3 (ref 0.4–1.3)
NUCLEATED RED BLOOD CELLS: 0 /100 WBC
PDW BLD-RTO: 12.7 % (ref 11.5–14.5)
PLATELET # BLD: 188 THOU/MM3 (ref 130–400)
PMV BLD AUTO: 7 FL (ref 7.4–10.4)
POTASSIUM SERPL-SCNC: 5.2 MEQ/L (ref 3.5–5.2)
RBC # BLD: 4.47 MILL/MM3 (ref 4.7–6.1)
SEG NEUTROPHILS: 65.7 %
SEGMENTED NEUTROPHILS ABSOLUTE COUNT: 5.1 THOU/MM3 (ref 1.8–7.7)
SODIUM BLD-SCNC: 139 MEQ/L (ref 135–145)
TOTAL PROTEIN: 7.1 G/DL (ref 6.1–8)
WBC # BLD: 7.7 THOU/MM3 (ref 4.8–10.8)

## 2020-04-08 PROCEDURE — 99214 OFFICE O/P EST MOD 30 MIN: CPT | Performed by: INTERNAL MEDICINE

## 2020-04-08 PROCEDURE — G8427 DOCREV CUR MEDS BY ELIG CLIN: HCPCS | Performed by: INTERNAL MEDICINE

## 2020-04-08 PROCEDURE — 4040F PNEUMOC VAC/ADMIN/RCVD: CPT | Performed by: INTERNAL MEDICINE

## 2020-04-08 PROCEDURE — 80053 COMPREHEN METABOLIC PANEL: CPT

## 2020-04-08 PROCEDURE — 85025 COMPLETE CBC W/AUTO DIFF WBC: CPT

## 2020-04-08 PROCEDURE — G8417 CALC BMI ABV UP PARAM F/U: HCPCS | Performed by: INTERNAL MEDICINE

## 2020-04-08 PROCEDURE — 1123F ACP DISCUSS/DSCN MKR DOCD: CPT | Performed by: INTERNAL MEDICINE

## 2020-04-08 PROCEDURE — 4004F PT TOBACCO SCREEN RCVD TLK: CPT | Performed by: INTERNAL MEDICINE

## 2020-04-08 PROCEDURE — 82248 BILIRUBIN DIRECT: CPT

## 2020-04-08 PROCEDURE — 36415 COLL VENOUS BLD VENIPUNCTURE: CPT

## 2020-04-08 PROCEDURE — 99211 OFF/OP EST MAY X REQ PHY/QHP: CPT

## 2020-04-08 PROCEDURE — 3017F COLORECTAL CA SCREEN DOC REV: CPT | Performed by: INTERNAL MEDICINE

## 2020-04-08 ASSESSMENT — ENCOUNTER SYMPTOMS
COUGH: 0
BACK PAIN: 0
WHEEZING: 0
CONSTIPATION: 0
SHORTNESS OF BREATH: 0
VOMITING: 0
NAUSEA: 0
COLOR CHANGE: 0
FACIAL SWELLING: 0
CHEST TIGHTNESS: 0
BLOOD IN STOOL: 0
TROUBLE SWALLOWING: 0
SORE THROAT: 0
EYE DISCHARGE: 0
RECTAL PAIN: 0
DIARRHEA: 0
ABDOMINAL DISTENTION: 0
ABDOMINAL PAIN: 0

## 2020-04-08 NOTE — PROGRESS NOTES
diverticulum which was located on the right side.  The largest tumor was 6cm in size. Biopsy showediInvasive high-grade urothelial carcinoma, clear-cell variant.  Deep smooth muscle was present and involved by carcinoma. Patient developed hematuria beginning of March 2020. He received recommendation to proceed with bladder surgery. He presents to the medical oncology clinic to discuss neoadjuvant treatment. His hematuria stopped after TURP. He denies having any flank pain.   He denies having any new complaints since last visit with me        HPI   Past Medical History:   Diagnosis Date    Anxiety     Cancer ([address]     bladder chemo    Cataract     LEFT EYE    Diabetes mellitus ([address]     Glaucoma     LEFT EYE    History of hematuria     Hyperlipidemia    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     SINCE CHILDHOOD    Thyroid disease     Type 2 diabetes mellitus without complication ([address]       Past Surgical History:   Procedure Laterality Date    CYSTOSCOPY N/A 1/30/2019    TRANSURETHRAL RESECTION BLADDER TUMOR, CYSTOLITHOLAPAXY performed by Tae Farias MD at [facility] 3/31/2020    CYSTOSCOPY WITH TRANSURETHRAL RESECTION OF BLADDER TUMOR REMOVAL OF BLADDER STONE performed by Ilir Doan MD at [address] [place] [address] as kid    TONSILLECTOMY        Family History   Problem Relation Age of Onset    Other Mother         BRAIN TUMOR    Stroke Mother     Heart Disease Father     High Blood Pressure Father     Cancer Sister     Diabetes Neg Hx       Social History     Tobacco Use    Smoking status: Current Every Day Smoker     Packs/day: 1.00     Years: 53.00     Pack years: 53.00     Types: Cigarettes    Smokeless tobacco: Never Used    Tobacco comment: depending on tumor analysis   Substance Use Topics    Alcohol use: No     Comment: not in 40 years      Current Outpatient Medications   Medication Sig Dispense Refill    CHANTIX

## 2020-04-08 NOTE — PATIENT INSTRUCTIONS
1..  CT of the chest and MRI of the pelvis was in the next 4-5 days  2. PDL1 staining on bladder biopsy from March 31, 2020  3.   rTC to see me on Monday, April 20, 2020

## 2020-04-09 LAB
PD-L1 BLOCK ID: NORMAL
PD-L1 TISSUE SOURCE: NORMAL

## 2020-04-13 ENCOUNTER — HOSPITAL ENCOUNTER (OUTPATIENT)
Dept: CT IMAGING | Age: 75
Discharge: HOME | End: 2020-04-13
Payer: MEDICARE

## 2020-04-13 ENCOUNTER — HOSPITAL ENCOUNTER (OUTPATIENT)
Dept: MRI IMAGING | Age: 75
Discharge: HOME | End: 2020-04-13
Payer: MEDICARE

## 2020-04-13 PROCEDURE — 6360000004 HC RX CONTRAST MEDICATION: Performed by: INTERNAL MEDICINE

## 2020-04-13 PROCEDURE — 71260 CT THORAX DX C+: CPT

## 2020-04-13 PROCEDURE — A9579 GAD-BASE MR CONTRAST NOS,1ML: HCPCS | Performed by: INTERNAL MEDICINE

## 2020-04-13 PROCEDURE — 72197 MRI PELVIS W/O & W/DYE: CPT

## 2020-04-13 RX ADMIN — GADOTERIDOL 15 ML: 279.3 INJECTION, SOLUTION INTRAVENOUS at 08:29

## 2020-04-13 RX ADMIN — IOPAMIDOL 85 ML: 755 INJECTION, SOLUTION INTRAVENOUS at 08:40

## 2020-04-20 ENCOUNTER — HOSPITAL ENCOUNTER (OUTPATIENT)
Dept: INFUSION THERAPY | Age: 75
Discharge: HOME | End: 2020-04-20
Payer: MEDICARE

## 2020-04-20 ENCOUNTER — OFFICE VISIT (OUTPATIENT)
Dept: ONCOLOGY | Age: 75
End: 2020-04-20
Payer: MEDICARE

## 2020-04-20 VITALS
HEART RATE: 64 BPM | TEMPERATURE: 98.9 F | BODY MASS INDEX: 30.04 KG/M2 | SYSTOLIC BLOOD PRESSURE: 141 MMHG | OXYGEN SATURATION: 98 % | RESPIRATION RATE: 18 BRPM | DIASTOLIC BLOOD PRESSURE: 64 MMHG | HEIGHT: 67 IN | WEIGHT: 191.4 LBS

## 2020-04-20 PROCEDURE — G8427 DOCREV CUR MEDS BY ELIG CLIN: HCPCS | Performed by: INTERNAL MEDICINE

## 2020-04-20 PROCEDURE — 4004F PT TOBACCO SCREEN RCVD TLK: CPT | Performed by: INTERNAL MEDICINE

## 2020-04-20 PROCEDURE — 4040F PNEUMOC VAC/ADMIN/RCVD: CPT | Performed by: INTERNAL MEDICINE

## 2020-04-20 PROCEDURE — 99211 OFF/OP EST MAY X REQ PHY/QHP: CPT

## 2020-04-20 PROCEDURE — 99214 OFFICE O/P EST MOD 30 MIN: CPT | Performed by: INTERNAL MEDICINE

## 2020-04-20 PROCEDURE — 3017F COLORECTAL CA SCREEN DOC REV: CPT | Performed by: INTERNAL MEDICINE

## 2020-04-20 PROCEDURE — 1123F ACP DISCUSS/DSCN MKR DOCD: CPT | Performed by: INTERNAL MEDICINE

## 2020-04-20 PROCEDURE — G8417 CALC BMI ABV UP PARAM F/U: HCPCS | Performed by: INTERNAL MEDICINE

## 2020-04-20 RX ORDER — EPINEPHRINE 1 MG/ML
0.3 INJECTION, SOLUTION, CONCENTRATE INTRAVENOUS PRN
Status: CANCELLED | OUTPATIENT
Start: 2020-05-26

## 2020-04-20 RX ORDER — SODIUM CHLORIDE 0.9 % (FLUSH) 0.9 %
5 SYRINGE (ML) INJECTION PRN
Status: CANCELLED | OUTPATIENT
Start: 2020-05-26

## 2020-04-20 RX ORDER — METHYLPREDNISOLONE SODIUM SUCCINATE 125 MG/2ML
125 INJECTION, POWDER, LYOPHILIZED, FOR SOLUTION INTRAMUSCULAR; INTRAVENOUS ONCE
Status: CANCELLED | OUTPATIENT
Start: 2020-05-05

## 2020-04-20 RX ORDER — SODIUM CHLORIDE 9 MG/ML
INJECTION, SOLUTION INTRAVENOUS CONTINUOUS
Status: CANCELLED | OUTPATIENT
Start: 2020-05-05

## 2020-04-20 RX ORDER — SODIUM CHLORIDE 0.9 % (FLUSH) 0.9 %
10 SYRINGE (ML) INJECTION PRN
Status: CANCELLED | OUTPATIENT
Start: 2020-05-26

## 2020-04-20 RX ORDER — SODIUM CHLORIDE 0.9 % (FLUSH) 0.9 %
10 SYRINGE (ML) INJECTION PRN
Status: CANCELLED | OUTPATIENT
Start: 2020-04-28

## 2020-04-20 RX ORDER — SODIUM CHLORIDE 9 MG/ML
INJECTION, SOLUTION INTRAVENOUS CONTINUOUS
Status: CANCELLED | OUTPATIENT
Start: 2020-05-26

## 2020-04-20 RX ORDER — SODIUM CHLORIDE 0.9 % (FLUSH) 0.9 %
10 SYRINGE (ML) INJECTION PRN
Status: CANCELLED | OUTPATIENT
Start: 2020-05-05

## 2020-04-20 RX ORDER — METHYLPREDNISOLONE SODIUM SUCCINATE 125 MG/2ML
125 INJECTION, POWDER, LYOPHILIZED, FOR SOLUTION INTRAMUSCULAR; INTRAVENOUS ONCE
Status: CANCELLED | OUTPATIENT
Start: 2020-05-26

## 2020-04-20 RX ORDER — DIPHENHYDRAMINE HYDROCHLORIDE 50 MG/ML
50 INJECTION INTRAMUSCULAR; INTRAVENOUS ONCE
Status: CANCELLED | OUTPATIENT
Start: 2020-05-26

## 2020-04-20 RX ORDER — METHYLPREDNISOLONE SODIUM SUCCINATE 125 MG/2ML
125 INJECTION, POWDER, LYOPHILIZED, FOR SOLUTION INTRAMUSCULAR; INTRAVENOUS ONCE
Status: CANCELLED | OUTPATIENT
Start: 2020-04-28

## 2020-04-20 RX ORDER — EPINEPHRINE 1 MG/ML
0.3 INJECTION, SOLUTION, CONCENTRATE INTRAVENOUS PRN
Status: CANCELLED | OUTPATIENT
Start: 2020-05-05

## 2020-04-20 RX ORDER — HEPARIN SODIUM (PORCINE) LOCK FLUSH IV SOLN 100 UNIT/ML 100 UNIT/ML
500 SOLUTION INTRAVENOUS PRN
Status: CANCELLED | OUTPATIENT
Start: 2020-05-26

## 2020-04-20 RX ORDER — SODIUM CHLORIDE 0.9 % (FLUSH) 0.9 %
5 SYRINGE (ML) INJECTION PRN
Status: CANCELLED | OUTPATIENT
Start: 2020-05-05

## 2020-04-20 RX ORDER — SODIUM CHLORIDE 9 MG/ML
20 INJECTION, SOLUTION INTRAVENOUS ONCE
Status: CANCELLED | OUTPATIENT
Start: 2020-04-28

## 2020-04-20 RX ORDER — EPINEPHRINE 1 MG/ML
0.3 INJECTION, SOLUTION, CONCENTRATE INTRAVENOUS PRN
Status: CANCELLED | OUTPATIENT
Start: 2020-04-28

## 2020-04-20 RX ORDER — HEPARIN SODIUM (PORCINE) LOCK FLUSH IV SOLN 100 UNIT/ML 100 UNIT/ML
500 SOLUTION INTRAVENOUS PRN
Status: CANCELLED | OUTPATIENT
Start: 2020-04-28

## 2020-04-20 RX ORDER — DIPHENHYDRAMINE HYDROCHLORIDE 50 MG/ML
50 INJECTION INTRAMUSCULAR; INTRAVENOUS ONCE
Status: CANCELLED | OUTPATIENT
Start: 2020-05-05

## 2020-04-20 RX ORDER — DIPHENHYDRAMINE HYDROCHLORIDE 50 MG/ML
50 INJECTION INTRAMUSCULAR; INTRAVENOUS ONCE
Status: CANCELLED | OUTPATIENT
Start: 2020-04-28

## 2020-04-20 RX ORDER — SODIUM CHLORIDE 9 MG/ML
INJECTION, SOLUTION INTRAVENOUS CONTINUOUS
Status: CANCELLED | OUTPATIENT
Start: 2020-04-28

## 2020-04-20 RX ORDER — HEPARIN SODIUM (PORCINE) LOCK FLUSH IV SOLN 100 UNIT/ML 100 UNIT/ML
500 SOLUTION INTRAVENOUS PRN
Status: CANCELLED | OUTPATIENT
Start: 2020-05-05

## 2020-04-20 RX ORDER — PALONOSETRON 0.05 MG/ML
0.25 INJECTION, SOLUTION INTRAVENOUS ONCE
Status: CANCELLED | OUTPATIENT
Start: 2020-04-28

## 2020-04-20 RX ORDER — SODIUM CHLORIDE 9 MG/ML
20 INJECTION, SOLUTION INTRAVENOUS ONCE
Status: CANCELLED | OUTPATIENT
Start: 2020-05-05

## 2020-04-20 RX ORDER — SODIUM CHLORIDE 9 MG/ML
20 INJECTION, SOLUTION INTRAVENOUS ONCE
Status: CANCELLED | OUTPATIENT
Start: 2020-05-26

## 2020-04-20 RX ORDER — SODIUM CHLORIDE 0.9 % (FLUSH) 0.9 %
5 SYRINGE (ML) INJECTION PRN
Status: CANCELLED | OUTPATIENT
Start: 2020-04-28

## 2020-04-21 LAB — MISC. #1 REFERENCE GROUP TEST: NORMAL

## 2020-04-22 RX ORDER — SODIUM CHLORIDE 0.9 % (FLUSH) 0.9 %
20 SYRINGE (ML) INJECTION PRN
Status: CANCELLED | OUTPATIENT
Start: 2020-04-22

## 2020-04-22 RX ORDER — HEPARIN SODIUM (PORCINE) LOCK FLUSH IV SOLN 100 UNIT/ML 100 UNIT/ML
500 SOLUTION INTRAVENOUS PRN
Status: CANCELLED | OUTPATIENT
Start: 2020-04-22

## 2020-04-22 RX ORDER — SODIUM CHLORIDE 0.9 % (FLUSH) 0.9 %
10 SYRINGE (ML) INJECTION PRN
Status: CANCELLED | OUTPATIENT
Start: 2020-04-22

## 2020-04-28 ENCOUNTER — HOSPITAL ENCOUNTER (OUTPATIENT)
Dept: INFUSION THERAPY | Age: 75
Discharge: HOME | End: 2020-04-28
Payer: MEDICARE

## 2020-04-28 ENCOUNTER — OFFICE VISIT (OUTPATIENT)
Dept: ONCOLOGY | Age: 75
End: 2020-04-28
Payer: MEDICARE

## 2020-04-28 VITALS
DIASTOLIC BLOOD PRESSURE: 66 MMHG | TEMPERATURE: 97.7 F | WEIGHT: 193.6 LBS | HEART RATE: 68 BPM | RESPIRATION RATE: 20 BRPM | BODY MASS INDEX: 30.39 KG/M2 | OXYGEN SATURATION: 96 % | HEIGHT: 67 IN | SYSTOLIC BLOOD PRESSURE: 149 MMHG

## 2020-04-28 VITALS
BODY MASS INDEX: 30.39 KG/M2 | HEIGHT: 67 IN | OXYGEN SATURATION: 96 % | WEIGHT: 193.6 LBS | SYSTOLIC BLOOD PRESSURE: 128 MMHG | TEMPERATURE: 98.6 F | HEART RATE: 70 BPM | RESPIRATION RATE: 16 BRPM | DIASTOLIC BLOOD PRESSURE: 60 MMHG

## 2020-04-28 DIAGNOSIS — C67.8 MALIGNANT NEOPLASM OF OVERLAPPING SITES OF BLADDER (HCC): ICD-10-CM

## 2020-04-28 DIAGNOSIS — C67.9 LOCAL RECURRENCE OF CANCER OF URINARY BLADDER (HCC): Primary | ICD-10-CM

## 2020-04-28 LAB
ALBUMIN SERPL-MCNC: 4 G/DL (ref 3.5–5.1)
ALP BLD-CCNC: 103 U/L (ref 38–126)
ALT SERPL-CCNC: 15 U/L (ref 11–66)
AST SERPL-CCNC: 21 U/L (ref 5–40)
BILIRUB SERPL-MCNC: 0.3 MG/DL (ref 0.3–1.2)
BILIRUBIN DIRECT: < 0.2 MG/DL (ref 0–0.3)
BUN BLDV-MCNC: 12 MG/DL (ref 7–22)
CHLORIDE, WHOLE BLOOD: 101 MEQ/L (ref 98–109)
CO2: 29 MEQ/L (ref 23–33)
CREATININE, WHOLE BLOOD: 0.8 MG/DL (ref 0.5–1.2)
GFR, ESTIMATED: > 90 ML/MIN/1.73M2
GLUCOSE, WHOLE BLOOD: 105 MG/DL (ref 70–108)
HCT VFR BLD CALC: 43.5 % (ref 42–52)
HEMOGLOBIN: 13.9 GM/DL (ref 14–18)
IONIZED CALCIUM, WHOLE BLOOD: 1.29 MMOL/L (ref 1.12–1.32)
MCH RBC QN AUTO: 31.1 PG (ref 26–33)
MCHC RBC AUTO-ENTMCNC: 32 GM/DL (ref 32.2–35.5)
MCV RBC AUTO: 97 FL (ref 80–94)
PDW BLD-RTO: 13.1 % (ref 11.5–14.5)
PLATELET # BLD: 145 THOU/MM3 (ref 130–400)
PMV BLD AUTO: 9.2 FL (ref 9.4–12.4)
POTASSIUM, WHOLE BLOOD: 3.9 MEQ/L (ref 3.5–4.9)
RBC # BLD: 4.47 MILL/MM3 (ref 4.7–6.1)
SEG NEUTROPHILS: 65 % (ref 43–75)
SEGMENTED NEUTROPHILS ABSOLUTE COUNT: 4.4 THOU/MM3 (ref 1.8–7.7)
SODIUM, WHOLE BLOOD: 141 MEQ/L (ref 138–146)
TOTAL PROTEIN: 6.7 G/DL (ref 6.1–8)
WBC # BLD: 6.8 THOU/MM3 (ref 4.8–10.8)

## 2020-04-28 PROCEDURE — 96367 TX/PROPH/DG ADDL SEQ IV INF: CPT

## 2020-04-28 PROCEDURE — G8427 DOCREV CUR MEDS BY ELIG CLIN: HCPCS | Performed by: INTERNAL MEDICINE

## 2020-04-28 PROCEDURE — 96413 CHEMO IV INFUSION 1 HR: CPT

## 2020-04-28 PROCEDURE — 36415 COLL VENOUS BLD VENIPUNCTURE: CPT

## 2020-04-28 PROCEDURE — 99214 OFFICE O/P EST MOD 30 MIN: CPT | Performed by: INTERNAL MEDICINE

## 2020-04-28 PROCEDURE — 2580000003 HC RX 258: Performed by: INTERNAL MEDICINE

## 2020-04-28 PROCEDURE — G8417 CALC BMI ABV UP PARAM F/U: HCPCS | Performed by: INTERNAL MEDICINE

## 2020-04-28 PROCEDURE — 96417 CHEMO IV INFUS EACH ADDL SEQ: CPT

## 2020-04-28 PROCEDURE — 82374 ASSAY BLOOD CARBON DIOXIDE: CPT

## 2020-04-28 PROCEDURE — 84520 ASSAY OF UREA NITROGEN: CPT

## 2020-04-28 PROCEDURE — 80076 HEPATIC FUNCTION PANEL: CPT

## 2020-04-28 PROCEDURE — 6360000002 HC RX W HCPCS: Performed by: INTERNAL MEDICINE

## 2020-04-28 PROCEDURE — 85027 COMPLETE CBC AUTOMATED: CPT

## 2020-04-28 PROCEDURE — 1123F ACP DISCUSS/DSCN MKR DOCD: CPT | Performed by: INTERNAL MEDICINE

## 2020-04-28 PROCEDURE — 80047 BASIC METABLC PNL IONIZED CA: CPT

## 2020-04-28 PROCEDURE — 96366 THER/PROPH/DIAG IV INF ADDON: CPT

## 2020-04-28 PROCEDURE — 3017F COLORECTAL CA SCREEN DOC REV: CPT | Performed by: INTERNAL MEDICINE

## 2020-04-28 PROCEDURE — 4004F PT TOBACCO SCREEN RCVD TLK: CPT | Performed by: INTERNAL MEDICINE

## 2020-04-28 PROCEDURE — 4040F PNEUMOC VAC/ADMIN/RCVD: CPT | Performed by: INTERNAL MEDICINE

## 2020-04-28 PROCEDURE — 99211 OFF/OP EST MAY X REQ PHY/QHP: CPT

## 2020-04-28 PROCEDURE — 96375 TX/PRO/DX INJ NEW DRUG ADDON: CPT

## 2020-04-28 PROCEDURE — 96415 CHEMO IV INFUSION ADDL HR: CPT

## 2020-04-28 RX ORDER — SODIUM CHLORIDE 9 MG/ML
20 INJECTION, SOLUTION INTRAVENOUS ONCE
Status: COMPLETED | OUTPATIENT
Start: 2020-04-28 | End: 2020-04-28

## 2020-04-28 RX ORDER — OLANZAPINE 5 MG/1
5 TABLET ORAL NIGHTLY
Qty: 30 TABLET | Refills: 3 | Status: SHIPPED | OUTPATIENT
Start: 2020-04-28

## 2020-04-28 RX ORDER — DEXAMETHASONE 4 MG/1
8 TABLET ORAL
Qty: 6 TABLET | Refills: 2 | Status: SHIPPED | OUTPATIENT
Start: 2020-04-29 | End: 2020-05-02

## 2020-04-28 RX ORDER — PALONOSETRON 0.05 MG/ML
0.25 INJECTION, SOLUTION INTRAVENOUS ONCE
Status: COMPLETED | OUTPATIENT
Start: 2020-04-28 | End: 2020-04-28

## 2020-04-28 RX ORDER — PROCHLORPERAZINE MALEATE 5 MG/1
5 TABLET ORAL EVERY 6 HOURS PRN
Qty: 60 TABLET | Refills: 1 | Status: SHIPPED | OUTPATIENT
Start: 2020-04-28

## 2020-04-28 RX ADMIN — MANNITOL: 250 INJECTION, SOLUTION INTRAVENOUS at 12:26

## 2020-04-28 RX ADMIN — SODIUM CHLORIDE 20 ML/HR: 9 INJECTION, SOLUTION INTRAVENOUS at 09:26

## 2020-04-28 RX ADMIN — SODIUM CHLORIDE 150 MG: 9 INJECTION, SOLUTION INTRAVENOUS at 09:53

## 2020-04-28 RX ADMIN — GEMCITABINE 2000 MG: 38 INJECTION, SOLUTION INTRAVENOUS at 11:40

## 2020-04-28 RX ADMIN — MAGNESIUM SULFATE HEPTAHYDRATE: 500 INJECTION, SOLUTION INTRAMUSCULAR; INTRAVENOUS at 15:32

## 2020-04-28 RX ADMIN — DEXAMETHASONE SODIUM PHOSPHATE 12 MG: 4 INJECTION, SOLUTION INTRAMUSCULAR; INTRAVENOUS at 09:31

## 2020-04-28 RX ADMIN — MAGNESIUM SULFATE HEPTAHYDRATE: 500 INJECTION, SOLUTION INTRAMUSCULAR; INTRAVENOUS at 10:34

## 2020-04-28 RX ADMIN — PALONOSETRON HYDROCHLORIDE 0.25 MG: 0.25 INJECTION, SOLUTION INTRAVENOUS at 10:25

## 2020-04-28 ASSESSMENT — ENCOUNTER SYMPTOMS
ABDOMINAL DISTENTION: 0
BACK PAIN: 0
ABDOMINAL PAIN: 0
DIARRHEA: 0
RECTAL PAIN: 0
CHEST TIGHTNESS: 0
BLOOD IN STOOL: 0
TROUBLE SWALLOWING: 0
NAUSEA: 0
COUGH: 0
WHEEZING: 0
ABDOMINAL DISTENTION: 0
BACK PAIN: 0
EYE DISCHARGE: 0
COUGH: 0
WHEEZING: 0
DIARRHEA: 0
COLOR CHANGE: 0
VOMITING: 0
NAUSEA: 0
CONSTIPATION: 0
SORE THROAT: 0
FACIAL SWELLING: 0
CHEST TIGHTNESS: 0
ABDOMINAL PAIN: 0
EYE DISCHARGE: 0
BLOOD IN STOOL: 0
CONSTIPATION: 0
COLOR CHANGE: 0
FACIAL SWELLING: 0
TROUBLE SWALLOWING: 0
VOMITING: 0
SHORTNESS OF BREATH: 0
SORE THROAT: 0
RECTAL PAIN: 0
SHORTNESS OF BREATH: 0

## 2020-04-28 NOTE — PROGRESS NOTES
[facility] [address] [facility], [address] [facility] [address]  [place] [address]  Dept: [phone]  Dept Fax: [phone]  Loc: [phone]    Visit Date:4/28/2020     Awilda Simon is a 76 y.o. male who presents today for:   Chief Complaint   Patient presents with    Follow-up     BLADDER CANCER        HPI:   This is a 77-year-old man with muscle invasive bladder cancer. He had cystoscopy in August 2019 patient developed gross hematuria and flank pain end of December 2018. He had CT urogram on December 13, 2018 that showed irregular thickening of the right posterior lateral bladder wall. Findings were highly suspicious for the presence of malignancy. On January 28, 2019 the patient underwent cystoscopy by Dr. Molly Cloud. It showed bladder trabeculations and bladder stone, on the right side there was a diverticulum with a bladder stone and bladder tumor inside. On January 30, 2019 the patient underwent TURBT. Final pathology report showed invasive high-grade urothelial carcinoma, clear cell variant. Deep smooth muscle was involved by carcinoma. Dr. Molly Cloud recommended neoadjuvant chemotherapy before bladder resection. However, the patient was absolutely opposed to having a bladder surgery. He agreed to concurrent chemoradiation with understanding that outcome is inferior to neoadjuvant chemo and surgery. He started concurrent chemoradiation on March 18, 2019 and completed on May 15, 2019. Overall he tolerated treatment with cisplatin well. He had MRI of the pelvis done approximately 4 weeks from completion of concurrent chemoradiation, it showed nice response to treatment. He had cystoscopy in August 2019 that did not show residual tumor. I saw the patient last time in October 2019. Interval history on 04/28/2020: The patient presents to the medical oncology clinic to start neoadjuvant treatment with  Gemzar and cisplatin.   On March 31, 2020 the patient had cystoscopy which showed tumor along the rim of the bladder diverticulum which was located on the right side.  The largest tumor was 6 cm in size. Biopsy showed invasive high-grade urothelial carcinoma, clear-cell variant.  Deep smooth muscle was present and involved by carcinoma. He received recommendation to proceed with bladder surgery. There are the patient is refusing surgery at this point of time, he would like to see what to neoadjuvant chemotherapy will due to his bladder cancer  His hematuria stopped after TURP. He denies having any flank pain.   He denies having any new complaints since last visit with me      HPI   Past Medical History:   Diagnosis Date    Anxiety     Cancer ([address]     bladder chemo    Cataract     LEFT EYE    Diabetes mellitus [facility]     Glaucoma     LEFT EYE    History of hematuria     Hyperlipidemia    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     SINCE CHILDHOOD    Thyroid disease     Type 2 diabetes mellitus without complication ([facility]       Past Surgical History:   Procedure Laterality Date    CYSTOSCOPY N/A 1/30/2019    TRANSURETHRAL RESECTION BLADDER TUMOR, CYSTOLITHOLAPAXY performed by Gómez Jolly MD at [address] 3/31/2020    CYSTOSCOPY WITH TRANSURETHRAL RESECTION OF BLADDER TUMOR REMOVAL OF BLADDER STONE performed by Joanna Hahn MD at [facility] as kid    TONSILLECTOMY        Family History   Problem Relation Age of Onset    Other Mother         BRAIN TUMOR    Stroke Mother     Heart Disease Father     High Blood Pressure Father     Cancer Sister     Diabetes Neg Hx       Social History     Tobacco Use    Smoking status: Current Every Day Smoker     Packs/day: 1.00     Years: 53.00     Pack years: 53.00     Types: Cigarettes    Smokeless tobacco: Never Used    Tobacco comment: depending on tumor analysis   Substance Use Topics    Alcohol use: No     Comment: not in 40 years      Current Outpatient vomiting;  stomach pain, dark urine, valerie-colored stools, jaundice (yellowing of the skin or eyes);  numbness or tingly feeling in hands or feet;  hearing or vision problems;  skin changes where the medicine was injected; or  low magnesium (confusion, uneven heart rate, jerking muscle movements, muscle weakness or limp feeling). nausea, vomiting, loss of appetite;  tired feeling;  temporary hair loss; or  pain, swelling or redness where the medicine was injected. The patient wants to proceed with treatment. His vital signs are stable his labs are within normal range. We will proceed with cycle 1 day 1 today. The patient is placed on dexamethasone 8 mg daily for 3 days starting tomorrow. He is also placed on Zyprexa milligrams at bedtime. For possible infection of the left eyeball the patient received prescription for Cipro ointment, HE HAS an appointment with ophthalmologist beginning of May    . Return in about 2 weeks (around 5/12/2020).      Orders Placed:   Orders Placed This Encounter   Procedures    POC PANEL BMP W/IOCA     Standing Status:   Future     Standing Expiration Date:   4/28/2021    Hepatic Function Panel     Standing Status:   Future     Standing Expiration Date:   4/28/2021    CBC     Standing Status:   Future     Standing Expiration Date:   4/28/2021    Absolute Neutrophil     Standing Status:   Future     Standing Expiration Date:   4/28/2021        Medications Prescribed:   Orders Placed This Encounter   Medications    dexamethasone (DECADRON) 4 MG tablet     Sig: Take 2 tablets by mouth daily (with breakfast) for 3 doses     Dispense:  6 tablet     Refill:  2    OLANZapine (ZYPREXA) 5 MG tablet     Sig: Take 1 tablet by mouth nightly     Dispense:  30 tablet     Refill:  3    prochlorperazine (COMPAZINE) 5 MG tablet     Sig: Take 1 tablet by mouth every 6 hours as needed for Nausea     Dispense:  60 tablet     Refill:  1    ciprofloxacin HCl (CILOXAN) 0.3 % ophthalmic ointment

## 2020-04-28 NOTE — PLAN OF CARE
Problem: Intellectual/Education/Knowledge Deficit  Goal: Teaching initiated upon admission  Outcome: Met This Shift  Note: Patient verbalizes understanding to verbal information given on Gemzar and CIsplatin,action and possible side effects. Aware to call MD if develop complications. Intervention: Verbal/written education provided  Note:   Chemotherapy Teaching     What is Chemotherapy   Drug action [x]   Method of Administration [x]   Handouts given []     Side Effects  Nausea/vomiting [x]   Diarrhea [x]   Fatigue [x]   Signs / Symptoms of infection [x]   Neutropenia [x]   Thrombocytopenia [x]   Alopecia [x]   neuropathy [x]   [place] diet &  the importance of fluids [x]       Micellaneous  Importance of nutrition [x]   Importance of oral hygiene [x]   When to call the MD [x]   Monitoring labs [x]   Use of supportive services []     Explanation of Drug Regimen / Frequency  Day 1 cycle 1 Gemzar and cisplatin     Comments  Verbalized understanding to drug,action,side effects and when to call MD         Problem: Discharge Planning  Goal: Knowledge of discharge instructions  Description: Knowledge of discharge instructions     Outcome: Met This Shift  Note: Verbalize understanding of discharge instructions, follow up appointments, and when to call Physician. Intervention: Discharge to appropriate level of care  Note: Discuss understanding of discharge instructions, follow up appointments and when to call Physician. Problem: Falls - Risk of:  Goal: Will remain free from falls  Description: Will remain free from falls  Outcome: Met This Shift  Note: Free from falls while in O.P. Oncology. Intervention: Assess risk factors for falls  Note: Discussed the need to use the call light for assistance when getting up to ambulate. Care plan reviewed with patient. Patient verbalize understanding of the plan of care and contribute to goal setting.

## 2020-04-28 NOTE — PROGRESS NOTES
Cigarettes    Smokeless tobacco: Never Used    Tobacco comment: depending on tumor analysis   Substance Use Topics    Alcohol use: No     Comment: not in 40 years      Current Outpatient Medications   Medication Sig Dispense Refill    CHANTIX CONTINUING MONTH SAV 1 MG tablet daily      linagliptin (TRADJENTA) 5 MG tablet Take 5 mg by mouth daily      Multiple Vitamins-Minerals (CVS SPECTRAVITE ADULT 50+ PO) Take by mouth      Saw [place] 450 MG CAPS Take by mouth daily Indications: 3-4 times per week PRN       atorvastatin (LIPITOR) 40 MG tablet Take 40 mg by mouth daily      metFORMIN (GLUCOPHAGE) 500 MG tablet Take by mouth 4 times daily 2 tab      levothyroxine (SYNTHROID) 125 MCG tablet Take 125 mcg by mouth Daily      prednisoLONE acetate (PRED FORTE) 1 % ophthalmic suspension 1 drop 4 times daily      timolol (BETIMOL) 0.5 % ophthalmic solution 1 drop 2 times daily      brimonidine (ALPHAGAN) 0.2 % ophthalmic solution 1 drop 3 times daily      cyclopentolate (CYCLOGYL) 1 % ophthalmic solution 1 drop once       No current facility-administered medications for this visit. No Known Allergies   Health Maintenance   Topic Date Due    Hepatitis C screen  1945    Lipid screen  12/16/1955    DTaP/Tdap/Td vaccine (1 - Tdap) 12/16/1964    Shingles Vaccine (1 of 2) 12/16/1995    Colon cancer screen colonoscopy  12/16/1995    Annual Wellness Visit (AWV)  06/19/2019    Low dose CT lung screening  04/13/2021    Flu vaccine  Completed    Pneumococcal 65+ years Vaccine  Completed    AAA screen  Completed    Hepatitis A vaccine  Aged Out    Hepatitis B vaccine  Aged Out    Hib vaccine  Aged Out    Meningococcal (ACWY) vaccine  Aged Out        Subjective:   Review of Systems   Constitutional: Negative for activity change, appetite change, fatigue and fever.    HENT: Negative for congestion, dental problem, facial swelling, hearing loss, mouth sores, nosebleeds, sore throat, tinnitus and

## 2020-04-28 NOTE — ONCOLOGY
Chemotherapy Administration    Pre-assessment Data: Antineoplastic Agents  Other:   See toxicity flow sheet for assessment [x]     Physician Notification of Concerns Related to Chemotherapy Administration:   Physician Notified Gerilyn Shone / Time of Notification      Interventions:   Lab work assessed  [x]   Height / Weight verified for dose [x]   Current MAR reviewed [x]   Emergency drugs available as appropriate [x]   Anaphylaxis assessment completed [x]   Pre-medications administered as ordered [x]   Blood return noted upon initiation of chemotherapy [x]   Blood return noted each 1-2ml of a vesicant medication if given IV push []   Blood return noted each 2-3ml of a non-vesicant medication if given IV push []   Monitor for signs / symptoms of hypersensitivity reaction [x]   Chemotherapy orders (drug/dose/rate) verified by 2 Chemo certified RNs [x]   Monitor IV site and blood return throughout the infusion of the medication [x]   Document IV site checks on the IV assessment form [x]   Document chemotherapy teaching on the Patient Education tab [x]   Document patient verbalizes understanding of medications being administered- gemzar and Cisplatin [x]   If IV infiltration, see ONS Guidelines []   Other:      []

## 2020-04-28 NOTE — PROGRESS NOTES
Patient assessed for the following post chemotherapy:    Dizziness   No  Lightheadedness  No      Acute nausea/vomiting No  Headache   No  Chest pain/pressure  No  Rash/itching   No  Shortness of breath  No     Patient monitored during his post hydration. Patient tolerated chemotherapy treatment Gemzar and Cisplatin without any complications. Last vital signs:   /60   Pulse 70   Temp 98.6 °F (37 °C) (Oral)   Resp 16   Ht 5' 7\" (1.702 m)   Wt 193 lb 9.6 oz (87.8 kg)   SpO2 96%   BMI 30.32 kg/m²     Patient instructed if experience any of the above symptoms following today's infusion,he/she is to notify MD immediately or go to the emergency department. Discharge instructions given to patient. Verbalizes understanding. Ambulated off unit per self in stable condition with belongings.

## 2020-04-29 ENCOUNTER — TELEPHONE (OUTPATIENT)
Dept: ONCOLOGY | Age: 75
End: 2020-04-29

## 2020-04-29 RX ORDER — SULFACETAMIDE SODIUM 100 MG/G
OINTMENT OPHTHALMIC
Qty: 3.5 G | Refills: 1 | Status: SHIPPED | OUTPATIENT
Start: 2020-04-29 | End: 2020-05-09

## 2020-04-30 ENCOUNTER — TELEPHONE (OUTPATIENT)
Dept: ONCOLOGY | Age: 75
End: 2020-04-30

## 2020-04-30 ENCOUNTER — HOSPITAL ENCOUNTER (OUTPATIENT)
Age: 75
Discharge: HOME | End: 2020-04-30
Payer: MEDICARE

## 2020-04-30 LAB
BACTERIA: ABNORMAL /HPF
BILIRUBIN URINE: NEGATIVE
BLOOD, URINE: ABNORMAL
CASTS 2: ABNORMAL /LPF
CASTS UA: ABNORMAL /LPF
CHARACTER, URINE: CLEAR
COLOR: YELLOW
CRYSTALS, UA: ABNORMAL
EPITHELIAL CELLS, UA: ABNORMAL /HPF
GLUCOSE URINE: NEGATIVE MG/DL
KETONES, URINE: NEGATIVE
LEUKOCYTE ESTERASE, URINE: ABNORMAL
MISCELLANEOUS 2: ABNORMAL
NITRITE, URINE: NEGATIVE
PH UA: 5.5 (ref 5–9)
PROTEIN UA: ABNORMAL
RBC URINE: ABNORMAL /HPF
RENAL EPITHELIAL, UA: ABNORMAL
SPECIFIC GRAVITY, URINE: 1.02 (ref 1–1.03)
UROBILINOGEN, URINE: 1 EU/DL (ref 0–1)
WBC UA: ABNORMAL /HPF
YEAST: ABNORMAL

## 2020-04-30 PROCEDURE — 99999 URINE RT REFLEX TO CULTURE: CPT | Performed by: INTERNAL MEDICINE

## 2020-04-30 PROCEDURE — 81001 URINALYSIS AUTO W/SCOPE: CPT

## 2020-04-30 PROCEDURE — 87086 URINE CULTURE/COLONY COUNT: CPT

## 2020-04-30 RX ORDER — ERYTHROMYCIN 5 MG/G
OINTMENT OPHTHALMIC
Qty: 1 G | Refills: 1 | Status: SHIPPED | OUTPATIENT
Start: 2020-04-30 | End: 2020-06-09 | Stop reason: SDUPTHER

## 2020-04-30 NOTE — TELEPHONE ENCOUNTER
Spoke with patient and he will be in today to give a urine sample and  Please place order and thanks.

## 2020-05-01 ENCOUNTER — TELEPHONE (OUTPATIENT)
Dept: ONCOLOGY | Age: 75
End: 2020-05-01

## 2020-05-02 LAB — URINE CULTURE REFLEX: NORMAL

## 2020-05-05 ENCOUNTER — VIRTUAL VISIT (OUTPATIENT)
Dept: UROLOGY | Age: 75
End: 2020-05-05
Payer: MEDICARE

## 2020-05-05 ENCOUNTER — HOSPITAL ENCOUNTER (OUTPATIENT)
Dept: INFUSION THERAPY | Age: 75
Discharge: HOME | End: 2020-05-05
Payer: MEDICARE

## 2020-05-05 VITALS
DIASTOLIC BLOOD PRESSURE: 60 MMHG | OXYGEN SATURATION: 96 % | WEIGHT: 189.4 LBS | HEART RATE: 76 BPM | RESPIRATION RATE: 16 BRPM | BODY MASS INDEX: 29.73 KG/M2 | TEMPERATURE: 97.9 F | SYSTOLIC BLOOD PRESSURE: 132 MMHG | HEIGHT: 67 IN

## 2020-05-05 DIAGNOSIS — C67.8 MALIGNANT NEOPLASM OF OVERLAPPING SITES OF BLADDER (HCC): Primary | ICD-10-CM

## 2020-05-05 DIAGNOSIS — C67.9 LOCAL RECURRENCE OF CANCER OF URINARY BLADDER (HCC): ICD-10-CM

## 2020-05-05 LAB
ALBUMIN SERPL-MCNC: 3.9 G/DL (ref 3.5–5.1)
ALP BLD-CCNC: 115 U/L (ref 38–126)
ALT SERPL-CCNC: 24 U/L (ref 11–66)
AST SERPL-CCNC: 21 U/L (ref 5–40)
AVERAGE GLUCOSE: 117 MG/DL (ref 70–126)
BILIRUB SERPL-MCNC: 0.7 MG/DL (ref 0.3–1.2)
BILIRUBIN DIRECT: < 0.2 MG/DL (ref 0–0.3)
BUN BLDV-MCNC: 17 MG/DL (ref 7–22)
CHLORIDE, WHOLE BLOOD: 96 MEQ/L (ref 98–109)
CHOLESTEROL, TOTAL: 176 MG/DL (ref 100–199)
CO2: 30 MEQ/L (ref 23–33)
CREATININE, URINE: 103.6 MG/DL
CREATININE, WHOLE BLOOD: 0.7 MG/DL (ref 0.5–1.2)
GFR, ESTIMATED: > 90 ML/MIN/1.73M2
GLUCOSE, WHOLE BLOOD: 129 MG/DL (ref 70–108)
HBA1C MFR BLD: 5.9 % (ref 4.4–6.4)
HCT VFR BLD CALC: 43.3 % (ref 42–52)
HDLC SERPL-MCNC: 47 MG/DL
HEMOGLOBIN: 14.1 GM/DL (ref 14–18)
IONIZED CALCIUM, WHOLE BLOOD: 1.24 MMOL/L (ref 1.12–1.32)
LDL CHOLESTEROL CALCULATED: 83 MG/DL
MCH RBC QN AUTO: 31.1 PG (ref 26–33)
MCHC RBC AUTO-ENTMCNC: 32.6 GM/DL (ref 32.2–35.5)
MCV RBC AUTO: 96 FL (ref 80–94)
MICROALBUMIN UR-MCNC: 11.96 MG/DL
MICROALBUMIN/CREAT UR-RTO: 115 MG/G (ref 0–30)
PDW BLD-RTO: 12.5 % (ref 11.5–14.5)
PLATELET # BLD: 88 THOU/MM3 (ref 130–400)
PMV BLD AUTO: 9.1 FL (ref 9.4–12.4)
POTASSIUM, WHOLE BLOOD: 3.8 MEQ/L (ref 3.5–4.9)
RBC # BLD: 4.53 MILL/MM3 (ref 4.7–6.1)
SEG NEUTROPHILS: 49 % (ref 43–75)
SEGMENTED NEUTROPHILS ABSOLUTE COUNT: 1.5 THOU/MM3 (ref 1.8–7.7)
SODIUM, WHOLE BLOOD: 140 MEQ/L (ref 138–146)
T4 FREE: 1.2 NG/DL (ref 0.93–1.76)
TOTAL PROTEIN: 6.4 G/DL (ref 6.1–8)
TRIGL SERPL-MCNC: 231 MG/DL (ref 0–199)
TSH SERPL DL<=0.05 MIU/L-ACNC: 4.64 UIU/ML (ref 0.4–4.2)
WBC # BLD: 3.1 THOU/MM3 (ref 4.8–10.8)

## 2020-05-05 PROCEDURE — 84520 ASSAY OF UREA NITROGEN: CPT

## 2020-05-05 PROCEDURE — 99211 OFF/OP EST MAY X REQ PHY/QHP: CPT

## 2020-05-05 PROCEDURE — 82374 ASSAY BLOOD CARBON DIOXIDE: CPT

## 2020-05-05 PROCEDURE — 80076 HEPATIC FUNCTION PANEL: CPT

## 2020-05-05 PROCEDURE — 96413 CHEMO IV INFUSION 1 HR: CPT

## 2020-05-05 PROCEDURE — 83036 HEMOGLOBIN GLYCOSYLATED A1C: CPT

## 2020-05-05 PROCEDURE — 2580000003 HC RX 258: Performed by: INTERNAL MEDICINE

## 2020-05-05 PROCEDURE — 96375 TX/PRO/DX INJ NEW DRUG ADDON: CPT

## 2020-05-05 PROCEDURE — 36415 COLL VENOUS BLD VENIPUNCTURE: CPT

## 2020-05-05 PROCEDURE — 80061 LIPID PANEL: CPT

## 2020-05-05 PROCEDURE — 84439 ASSAY OF FREE THYROXINE: CPT

## 2020-05-05 PROCEDURE — 85027 COMPLETE CBC AUTOMATED: CPT

## 2020-05-05 PROCEDURE — 80047 BASIC METABLC PNL IONIZED CA: CPT

## 2020-05-05 PROCEDURE — 84443 ASSAY THYROID STIM HORMONE: CPT

## 2020-05-05 PROCEDURE — 99442 PR PHYS/QHP TELEPHONE EVALUATION 11-20 MIN: CPT | Performed by: UROLOGY

## 2020-05-05 PROCEDURE — 82043 UR ALBUMIN QUANTITATIVE: CPT

## 2020-05-05 PROCEDURE — 6360000002 HC RX W HCPCS: Performed by: INTERNAL MEDICINE

## 2020-05-05 RX ORDER — SODIUM CHLORIDE 9 MG/ML
20 INJECTION, SOLUTION INTRAVENOUS ONCE
Status: COMPLETED | OUTPATIENT
Start: 2020-05-05 | End: 2020-05-05

## 2020-05-05 RX ORDER — DEXAMETHASONE SODIUM PHOSPHATE 4 MG/ML
8 INJECTION, SOLUTION INTRA-ARTICULAR; INTRALESIONAL; INTRAMUSCULAR; INTRAVENOUS; SOFT TISSUE ONCE
Status: COMPLETED | OUTPATIENT
Start: 2020-05-05 | End: 2020-05-05

## 2020-05-05 RX ADMIN — DEXAMETHASONE SODIUM PHOSPHATE 8 MG: 4 INJECTION, SOLUTION INTRA-ARTICULAR; INTRALESIONAL; INTRAMUSCULAR; INTRAVENOUS; SOFT TISSUE at 13:18

## 2020-05-05 RX ADMIN — GEMCITABINE 2000 MG: 38 INJECTION, SOLUTION INTRAVENOUS at 13:28

## 2020-05-05 RX ADMIN — SODIUM CHLORIDE 20 ML/HR: 9 INJECTION, SOLUTION INTRAVENOUS at 13:12

## 2020-05-05 ASSESSMENT — ENCOUNTER SYMPTOMS
SHORTNESS OF BREATH: 0
EYE PAIN: 0
CHEST TIGHTNESS: 0
FACIAL SWELLING: 0
ABDOMINAL PAIN: 0
COLOR CHANGE: 0
NAUSEA: 0
EYE REDNESS: 1
BACK PAIN: 1

## 2020-05-05 NOTE — PLAN OF CARE
Problem: Intellectual/Education/Knowledge Deficit  Goal: Teaching initiated upon admission  Outcome: Met This Shift  Note: Patient verbalizes understanding to verbal information given on gemcitabine, including action and possible side effects. Aware to call MD if develop complications. Intervention: Verbal/written education provided  Note:   Chemotherapy Teaching     What is Chemotherapy   Drug action [x]   Method of Administration [x]   Handouts given []     Side Effects  Nausea/vomiting [x]   Diarrhea [x]   Fatigue [x]   Signs / Symptoms of infection [x]   Neutropenia [x]   Thrombocytopenia [x]   Alopecia [x]   neuropathy [x]   [place] diet &  the importance of fluids [x]       Micellaneous  Importance of nutrition [x]   Importance of oral hygiene [x]   When to call the MD [x]   Monitoring labs [x]   Use of supportive services []     Explanation of Drug Regimen / Frequency  gemcitabine     Comments  Verbalized understanding to drug,action,side effects and when to call MD         Problem: Discharge Planning  Goal: Knowledge of discharge instructions  Description: Knowledge of discharge instructions     Outcome: Met This Shift  Note: Patient verbalizes understanding of discharge instructions, follow up appointment, and when to call physician if needed   Intervention: Discharge to appropriate level of care  Note: Discharge instructions given to pt and reviewed. Follow up appointments discussed. Problem: Falls - Risk of:  Goal: Will remain free from falls  Description: Will remain free from falls  Outcome: Met This Shift  Note: Patient free of falls this visit. Intervention: Assess risk factors for falls  Note: Fall risks assessed. Precautions discussed. Call light within reach during visit. Care plan reviewed with patient. Patient verbalizes understanding of the plan of care and contributes to goal setting.

## 2020-05-05 NOTE — PROGRESS NOTES
Patient assessed for the following post chemotherapy:    Dizziness   No  Lightheadedness  No      Acute nausea/vomiting No  Headache   No  Chest pain/pressure  No  Rash/itching   No  Shortness of breath  No    Patient kept for 20 minutes observation post infusion chemotherapy. Patient tolerated chemotherapy treatment gemzar without any complications. Last vital signs:   /60   Pulse 76   Temp 97.9 °F (36.6 °C) (Oral)   Resp 16   Ht 5' 7\" (1.702 m)   Wt 189 lb 6.4 oz (85.9 kg)   SpO2 96%   BMI 29.66 kg/m²       Patient instructed if experience any of the above symptoms following today's infusion, he is to notify MD immediately or go to the emergency department. Discharge instructions given to patient. Verbalizes understanding. Ambulated off unit per self with belongings.

## 2020-05-05 NOTE — ONCOLOGY
Chemotherapy Administration    Pre-assessment Data: Antineoplastic Agents  Other:   See toxicity flow sheet for assessment [x]     Physician Notification of Concerns Related to Chemotherapy Administration:   Physician Notified Mariam Lass / Time of Notification      Interventions:   Lab work assessed  [x]   Height / Weight verified for dose [x]   Current MAR reviewed [x]   Emergency drugs available as appropriate [x]   Anaphylaxis assessment completed [x]   Pre-medications administered as ordered [x]   Blood return noted upon initiation of chemotherapy [x]   Blood return noted each 1-2ml of a vesicant medication if given IV push []   Blood return noted each 2-3ml of a non-vesicant medication if given IV push []   Monitor for signs / symptoms of hypersensitivity reaction [x]   Chemotherapy orders (drug/dose/rate) verified by 2 Chemo certified RNs [x]   Monitor IV site and blood return throughout the infusion of the medication [x]   Document IV site checks on the IV assessment form [x]   Document chemotherapy teaching on the Patient Education tab [x]   Document patient verbalizes understanding of medications being administered [x]   If IV infiltration, see ONS Guidelines []   Other:      []

## 2020-05-11 ASSESSMENT — ENCOUNTER SYMPTOMS
SHORTNESS OF BREATH: 0
CONSTIPATION: 0
COUGH: 0
FACIAL SWELLING: 0
EYE DISCHARGE: 0
COLOR CHANGE: 0
WHEEZING: 0
ABDOMINAL DISTENTION: 0
SORE THROAT: 0
CHEST TIGHTNESS: 0
ABDOMINAL PAIN: 0
TROUBLE SWALLOWING: 0
VOMITING: 0
DIARRHEA: 0
BACK PAIN: 0
RECTAL PAIN: 0
BLOOD IN STOOL: 0
NAUSEA: 0

## 2020-05-12 ENCOUNTER — HOSPITAL ENCOUNTER (OUTPATIENT)
Dept: INFUSION THERAPY | Age: 75
Discharge: HOME | End: 2020-05-12
Payer: MEDICARE

## 2020-05-12 ENCOUNTER — OFFICE VISIT (OUTPATIENT)
Dept: ONCOLOGY | Age: 75
End: 2020-05-12
Payer: MEDICARE

## 2020-05-12 VITALS
BODY MASS INDEX: 29.98 KG/M2 | HEART RATE: 95 BPM | DIASTOLIC BLOOD PRESSURE: 65 MMHG | HEIGHT: 67 IN | RESPIRATION RATE: 18 BRPM | SYSTOLIC BLOOD PRESSURE: 134 MMHG | WEIGHT: 191 LBS | OXYGEN SATURATION: 98 % | TEMPERATURE: 98.1 F

## 2020-05-12 DIAGNOSIS — C67.9 LOCAL RECURRENCE OF CANCER OF URINARY BLADDER (HCC): ICD-10-CM

## 2020-05-12 LAB
BASOPHILS # BLD: 0 %
BASOPHILS ABSOLUTE: 0 THOU/MM3 (ref 0–0.1)
BUN BLDV-MCNC: 12 MG/DL (ref 7–22)
CHLORIDE, WHOLE BLOOD: 102 MEQ/L (ref 98–109)
CO2: 27 MEQ/L (ref 23–33)
CREATININE, WHOLE BLOOD: 0.7 MG/DL (ref 0.5–1.2)
EOSINOPHIL # BLD: 1.5 %
EOSINOPHILS ABSOLUTE: 0 THOU/MM3 (ref 0–0.4)
ERYTHROCYTE [DISTWIDTH] IN BLOOD BY AUTOMATED COUNT: 12.2 % (ref 11.5–14.5)
ERYTHROCYTE [DISTWIDTH] IN BLOOD BY AUTOMATED COUNT: 43.4 FL (ref 35–45)
GFR, ESTIMATED: > 90 ML/MIN/1.73M2
GLUCOSE, WHOLE BLOOD: 147 MG/DL (ref 70–108)
HCT VFR BLD CALC: 38.5 % (ref 42–52)
HEMOGLOBIN: 12.8 GM/DL (ref 14–18)
IMMATURE GRANS (ABS): 0 THOU/MM3 (ref 0–0.07)
IMMATURE GRANULOCYTES: 0 %
IONIZED CALCIUM, WHOLE BLOOD: 1.21 MMOL/L (ref 1.12–1.32)
LYMPHOCYTES # BLD: 76.3 %
LYMPHOCYTES ABSOLUTE: 1 THOU/MM3 (ref 1–4.8)
MCH RBC QN AUTO: 31.8 PG (ref 26–33)
MCHC RBC AUTO-ENTMCNC: 33.2 GM/DL (ref 32.2–35.5)
MCV RBC AUTO: 95.8 FL (ref 80–94)
MONOCYTES # BLD: 1.5 %
MONOCYTES ABSOLUTE: 0 THOU/MM3 (ref 0.4–1.3)
NUCLEATED RED BLOOD CELLS: 0 /100 WBC
PATHOLOGIST REVIEW: ABNORMAL
PLATELET # BLD: 14 THOU/MM3 (ref 130–400)
PLATELET ESTIMATE: ABNORMAL
PMV BLD AUTO: 12.6 FL (ref 9.4–12.4)
POIKILOCYTES: ABNORMAL
POTASSIUM, WHOLE BLOOD: 3.9 MEQ/L (ref 3.5–4.9)
RBC # BLD: 4.02 MILL/MM3 (ref 4.7–6.1)
SCAN OF BLOOD SMEAR: NORMAL
SEG NEUTROPHILS: 20.7 %
SEGMENTED NEUTROPHILS ABSOLUTE COUNT: 0.3 THOU/MM3 (ref 1.8–7.7)
SODIUM, WHOLE BLOOD: 141 MEQ/L (ref 138–146)
WBC # BLD: 1.3 THOU/MM3 (ref 4.8–10.8)

## 2020-05-12 PROCEDURE — 4040F PNEUMOC VAC/ADMIN/RCVD: CPT | Performed by: INTERNAL MEDICINE

## 2020-05-12 PROCEDURE — 85025 COMPLETE CBC W/AUTO DIFF WBC: CPT

## 2020-05-12 PROCEDURE — 3017F COLORECTAL CA SCREEN DOC REV: CPT | Performed by: INTERNAL MEDICINE

## 2020-05-12 PROCEDURE — 84520 ASSAY OF UREA NITROGEN: CPT

## 2020-05-12 PROCEDURE — 36415 COLL VENOUS BLD VENIPUNCTURE: CPT

## 2020-05-12 PROCEDURE — G8417 CALC BMI ABV UP PARAM F/U: HCPCS | Performed by: INTERNAL MEDICINE

## 2020-05-12 PROCEDURE — 99214 OFFICE O/P EST MOD 30 MIN: CPT | Performed by: INTERNAL MEDICINE

## 2020-05-12 PROCEDURE — 4004F PT TOBACCO SCREEN RCVD TLK: CPT | Performed by: INTERNAL MEDICINE

## 2020-05-12 PROCEDURE — 1123F ACP DISCUSS/DSCN MKR DOCD: CPT | Performed by: INTERNAL MEDICINE

## 2020-05-12 PROCEDURE — 82374 ASSAY BLOOD CARBON DIOXIDE: CPT

## 2020-05-12 PROCEDURE — 80047 BASIC METABLC PNL IONIZED CA: CPT

## 2020-05-12 PROCEDURE — G8427 DOCREV CUR MEDS BY ELIG CLIN: HCPCS | Performed by: INTERNAL MEDICINE

## 2020-05-12 NOTE — PROGRESS NOTES
[facility] [address] [place] [address] [address]  [address] [address]  Dept: [phone]  Dept Fax: [phone]: [phone]    Visit Date:5/12/2020     Destiny Pendleton is a 76 y.o. male who presents today for:   Chief Complaint   Patient presents with    Follow-up     BLADDER CANCER        HPI:   This is a 30-year-old man with muscle invasive bladder cancer. He had cystoscopy in August 2019 patient developed gross hematuria and flank pain end of December 2018. He had CT urogram on December 13, 2018 that showed irregular thickening of the right posterior lateral bladder wall. Findings were highly suspicious for the presence of malignancy. On January 28, 2019 the patient underwent cystoscopy by Dr. James Elmore. It showed bladder trabeculations and bladder stone, on the right side there was a diverticulum with a bladder stone and bladder tumor inside. On January 30, 2019 the patient underwent TURBT. Final pathology report showed invasive high-grade urothelial carcinoma, clear cell variant. Deep smooth muscle was involved by carcinoma. Dr. James Elmore recommended neoadjuvant chemotherapy before bladder resection. However, the patient was absolutely opposed to having a bladder surgery. He agreed to concurrent chemoradiation with understanding that outcome is inferior to neoadjuvant chemo and surgery. He started concurrent chemoradiation on March 18, 2019 and completed on May 15, 2019. Overall he tolerated treatment with cisplatin well. He had MRI of the pelvis done approximately 4 weeks from completion of concurrent chemoradiation, it showed nice response to treatment. He had cystoscopy in August 2019 that did not show residual tumor. I saw the patient last time in October 2019. On March 31, 2020 due to recurrent hematuria, the patient had cystoscopy which showed tumor along the rim of the bladder diverticulum which was located on the right side.  The largest tumor was 6 cm in size. CYSTOSCOPY N/A 3/31/2020    CYSTOSCOPY WITH TRANSURETHRAL RESECTION OF BLADDER TUMOR REMOVAL OF BLADDER STONE performed by Pao Roque MD at [facility] as kid    TONSILLECTOMY        Family History   Problem Relation Age of Onset    Other Mother         BRAIN TUMOR    Stroke Mother     Heart Disease Father     High Blood Pressure Father     Cancer Sister     Diabetes Neg Hx       Social History     Tobacco Use    Smoking status: Current Every Day Smoker     Packs/day: 1.00     Years: 53.00     Pack years: 53.00     Types: Cigarettes    Smokeless tobacco: Never Used    Tobacco comment: depending on tumor analysis   Substance Use Topics    Alcohol use: No     Comment: not in 40 years      Current Outpatient Medications   Medication Sig Dispense Refill    erythromycin (ROMYCIN) 5 MG/GM ophthalmic ointment apply to the left eye twice a day 1 g 1    OLANZapine (ZYPREXA) 5 MG tablet Take 1 tablet by mouth nightly 30 tablet 3    prochlorperazine (COMPAZINE) 5 MG tablet Take 1 tablet by mouth every 6 hours as needed for Nausea 60 tablet 1    CHANTIX CONTINUING MONTH SAV 1 MG tablet daily      linagliptin (TRADJENTA) 5 MG tablet Take 5 mg by mouth daily      Multiple Vitamins-Minerals (CVS SPECTRAVITE ADULT 50+ PO) Take by mouth      Saw [place] 450 MG CAPS Take by mouth daily Indications: 3-4 times per week PRN       atorvastatin (LIPITOR) 40 MG tablet Take 40 mg by mouth daily      metFORMIN (GLUCOPHAGE) 500 MG tablet Take by mouth 4 times daily 2 tab      levothyroxine (SYNTHROID) 125 MCG tablet Take 125 mcg by mouth Daily      prednisoLONE acetate (PRED FORTE) 1 % ophthalmic suspension 1 drop 4 times daily      timolol (BETIMOL) 0.5 % ophthalmic solution 1 drop 2 times daily      brimonidine (ALPHAGAN) 0.2 % ophthalmic solution 1 drop 3 times daily      cyclopentolate (CYCLOGYL) 1 % ophthalmic solution 1 drop once       No current facility-administered medications for this visit. No Known Allergies   Health Maintenance   Topic Date Due    Hepatitis C screen  1945    DTaP/Tdap/Td vaccine (1 - Tdap) 12/16/1964    Shingles Vaccine (1 of 2) 12/16/1995    Colon cancer screen colonoscopy  12/16/1995    Annual Wellness Visit (AWV)  06/19/2019    Low dose CT lung screening  04/13/2021    A1C test (Diabetic or Prediabetic)  05/05/2021    Lipid screen  05/05/2021    Flu vaccine  Completed    Pneumococcal 65+ years Vaccine  Completed    AAA screen  Completed    Hepatitis A vaccine  Aged Out    Hepatitis B vaccine  Aged Out    Hib vaccine  Aged Out    Meningococcal (ACWY) vaccine  Aged Out        Subjective:   Review of Systems   Constitutional: Negative for activity change, appetite change, fatigue and fever. HENT: Negative for congestion, dental problem, facial swelling, hearing loss, mouth sores, nosebleeds, sore throat, tinnitus and trouble swallowing. Eyes: Negative for discharge and visual disturbance. Respiratory: Negative for cough, chest tightness, shortness of breath and wheezing. Cardiovascular: Negative for chest pain, palpitations and leg swelling. Gastrointestinal: Negative for abdominal distention, abdominal pain, blood in stool, constipation, diarrhea, nausea, rectal pain and vomiting. Endocrine: Negative for cold intolerance, polydipsia and polyuria. Genitourinary: Negative for decreased urine volume, difficulty urinating, dysuria, flank pain, hematuria and urgency. Musculoskeletal: Negative for arthralgias, back pain, gait problem, joint swelling, myalgias and neck stiffness. Skin: Negative for color change, rash and wound. Neurological: Negative for dizziness, tremors, seizures, speech difficulty, weakness, light-headedness, numbness and headaches. Hematological: Negative for adenopathy. Does not bruise/bleed easily. Psychiatric/Behavioral: Negative for confusion and sleep disturbance. The patient is not nervous/anxious. Procedures    CBC Auto Differential     Standing Status:   Future     Standing Expiration Date:   5/12/2021        Medications Prescribed:   No orders of the defined types were placed in this encounter.

## 2020-05-15 ENCOUNTER — HOSPITAL ENCOUNTER (OUTPATIENT)
Age: 75
Discharge: HOME | End: 2020-05-15
Payer: MEDICARE

## 2020-05-15 DIAGNOSIS — C67.9 LOCAL RECURRENCE OF CANCER OF URINARY BLADDER (HCC): ICD-10-CM

## 2020-05-15 LAB
BASOPHILS # BLD: 0 %
BASOPHILS ABSOLUTE: 0 THOU/MM3 (ref 0–0.1)
EOSINOPHIL # BLD: 3.3 %
EOSINOPHILS ABSOLUTE: 0 THOU/MM3 (ref 0–0.4)
ERYTHROCYTE [DISTWIDTH] IN BLOOD BY AUTOMATED COUNT: 12.3 % (ref 11.5–14.5)
ERYTHROCYTE [DISTWIDTH] IN BLOOD BY AUTOMATED COUNT: 43 FL (ref 35–45)
HCT VFR BLD CALC: 37.3 % (ref 42–52)
HEMOGLOBIN: 12.3 GM/DL (ref 14–18)
IMMATURE GRANS (ABS): 0.01 THOU/MM3 (ref 0–0.07)
IMMATURE GRANULOCYTES: 0.7 %
LYMPHOCYTES # BLD: 79.5 %
LYMPHOCYTES ABSOLUTE: 1.2 THOU/MM3 (ref 1–4.8)
MCH RBC QN AUTO: 31.4 PG (ref 26–33)
MCHC RBC AUTO-ENTMCNC: 33 GM/DL (ref 32.2–35.5)
MCV RBC AUTO: 95.2 FL (ref 80–94)
MONOCYTES # BLD: 4.6 %
MONOCYTES ABSOLUTE: 0.1 THOU/MM3 (ref 0.4–1.3)
NUCLEATED RED BLOOD CELLS: 0 /100 WBC
PLATELET # BLD: 20 THOU/MM3 (ref 130–400)
PLATELET ESTIMATE: ABNORMAL
PMV BLD AUTO: 11.7 FL (ref 9.4–12.4)
RBC # BLD: 3.92 MILL/MM3 (ref 4.7–6.1)
SCAN OF BLOOD SMEAR: NORMAL
SEG NEUTROPHILS: 11.9 %
SEGMENTED NEUTROPHILS ABSOLUTE COUNT: 0.2 THOU/MM3 (ref 1.8–7.7)
WBC # BLD: 1.5 THOU/MM3 (ref 4.8–10.8)

## 2020-05-15 PROCEDURE — 85025 COMPLETE CBC W/AUTO DIFF WBC: CPT

## 2020-05-15 PROCEDURE — 36415 COLL VENOUS BLD VENIPUNCTURE: CPT

## 2020-05-17 NOTE — PROGRESS NOTES
Shaw Abbott is a 76 y.o. male evaluated via telephone on 5/5/2020. Consent:  He and/or health care decision maker is aware that that he may receive a bill for this telephone service, depending on his insurance coverage, and has provided verbal consent to proceed: Yes      Documentation:  I communicated with the patient and/or health care decision maker about bladder cancer. Details of this discussion including any medical advice provided:     Hx of muscle invasive bladder cancer  Seeing oncology  Discussed risks of not undergoing surgery  Pt is electing to still monitor. I strongly urged against this  Will follow up in two months with cystoscopy after second round of chemotherapy      I affirm this is a Patient Initiated Episode with a Patient who has not had a related appointment within my department in the past 7 days or scheduled within the next 24 hours.     Patient identification was verified at the start of the visit: Yes    Total Time: minutes: 11-20 minutes    Note: not billable if this call serves to triage the patient into an appointment for the relevant concern      Ok Filter

## 2020-05-19 ENCOUNTER — HOSPITAL ENCOUNTER (OUTPATIENT)
Age: 75
Discharge: HOME | End: 2020-05-19
Payer: MEDICARE

## 2020-05-19 DIAGNOSIS — C67.9 LOCAL RECURRENCE OF CANCER OF URINARY BLADDER (HCC): ICD-10-CM

## 2020-05-19 LAB
BASOPHILS # BLD: 0 %
BASOPHILS ABSOLUTE: 0 THOU/MM3 (ref 0–0.1)
EOSINOPHIL # BLD: 2.8 %
EOSINOPHILS ABSOLUTE: 0.1 THOU/MM3 (ref 0–0.4)
ERYTHROCYTE [DISTWIDTH] IN BLOOD BY AUTOMATED COUNT: 13.1 % (ref 11.5–14.5)
ERYTHROCYTE [DISTWIDTH] IN BLOOD BY AUTOMATED COUNT: 44.2 FL (ref 35–45)
HCT VFR BLD CALC: 38.1 % (ref 42–52)
HEMOGLOBIN: 12.8 GM/DL (ref 14–18)
IMMATURE GRANS (ABS): 0.01 THOU/MM3 (ref 0–0.07)
IMMATURE GRANULOCYTES: 0.6 %
LYMPHOCYTES # BLD: 63.3 %
LYMPHOCYTES ABSOLUTE: 1.1 THOU/MM3 (ref 1–4.8)
MCH RBC QN AUTO: 32.2 PG (ref 26–33)
MCHC RBC AUTO-ENTMCNC: 33.6 GM/DL (ref 32.2–35.5)
MCV RBC AUTO: 95.7 FL (ref 80–94)
MONOCYTES # BLD: 18.9 %
MONOCYTES ABSOLUTE: 0.3 THOU/MM3 (ref 0.4–1.3)
NUCLEATED RED BLOOD CELLS: 1 /100 WBC
PLATELET # BLD: 149 THOU/MM3 (ref 130–400)
PMV BLD AUTO: 10 FL (ref 9.4–12.4)
POIKILOCYTES: ABNORMAL
RBC # BLD: 3.98 MILL/MM3 (ref 4.7–6.1)
SCAN OF BLOOD SMEAR: NORMAL
SEG NEUTROPHILS: 14.4 %
SEGMENTED NEUTROPHILS ABSOLUTE COUNT: 0.3 THOU/MM3 (ref 1.8–7.7)
WBC # BLD: 1.8 THOU/MM3 (ref 4.8–10.8)

## 2020-05-19 PROCEDURE — 85025 COMPLETE CBC W/AUTO DIFF WBC: CPT

## 2020-05-19 PROCEDURE — 36415 COLL VENOUS BLD VENIPUNCTURE: CPT

## 2020-05-22 ENCOUNTER — HOSPITAL ENCOUNTER (OUTPATIENT)
Age: 75
Discharge: HOME | End: 2020-05-22
Payer: MEDICARE

## 2020-05-22 DIAGNOSIS — C67.9 LOCAL RECURRENCE OF CANCER OF URINARY BLADDER (HCC): ICD-10-CM

## 2020-05-22 LAB
ABSOLUTE IMMATURE GRANULOCYTE: 0.03 THOU/MM3 (ref 0–0.07)
BASINOPHIL, AUTOMATED: 0 % (ref 0–3)
BASOPHILS ABSOLUTE: 0 THOU/MM3 (ref 0–0.1)
EOSINOPHILS ABSOLUTE: 0 THOU/MM3 (ref 0–0.4)
EOSINOPHILS RELATIVE PERCENT: 1 % (ref 0–4)
HCT VFR BLD CALC: 38.6 % (ref 42–52)
HEMOGLOBIN: 12.8 GM/DL (ref 14–18)
IMMATURE GRANULOCYTES: 1 %
LYMPHOCYTES # BLD: 46 % (ref 15–47)
LYMPHOCYTES ABSOLUTE: 1.1 THOU/MM3 (ref 1–4.8)
MCH RBC QN AUTO: 31.4 PG (ref 26–33)
MCHC RBC AUTO-ENTMCNC: 33.2 GM/DL (ref 32.2–35.5)
MCV RBC AUTO: 95 FL (ref 80–94)
MONOCYTES ABSOLUTE: 0.6 THOU/MM3 (ref 0.4–1.3)
MONOCYTES: 25 % (ref 0–12)
PDW BLD-RTO: 12.9 % (ref 11.5–14.5)
PLATELET # BLD: 211 THOU/MM3 (ref 130–400)
PMV BLD AUTO: 8.9 FL (ref 9.4–12.4)
RBC # BLD: 4.08 MILL/MM3 (ref 4.7–6.1)
SEG NEUTROPHILS: 26 % (ref 43–75)
SEGMENTED NEUTROPHILS ABSOLUTE COUNT: 0.6 THOU/MM3 (ref 1.8–7.7)
WBC # BLD: 2.4 THOU/MM3 (ref 4.8–10.8)

## 2020-05-22 PROCEDURE — 85025 COMPLETE CBC W/AUTO DIFF WBC: CPT

## 2020-05-22 PROCEDURE — 36415 COLL VENOUS BLD VENIPUNCTURE: CPT

## 2020-05-26 ENCOUNTER — HOSPITAL ENCOUNTER (OUTPATIENT)
Age: 75
Discharge: HOME | End: 2020-05-26
Payer: MEDICARE

## 2020-05-26 ENCOUNTER — HOSPITAL ENCOUNTER (OUTPATIENT)
Dept: INFUSION THERAPY | Age: 75
Discharge: HOME | End: 2020-05-26
Payer: MEDICARE

## 2020-05-26 VITALS
WEIGHT: 191.2 LBS | SYSTOLIC BLOOD PRESSURE: 120 MMHG | HEIGHT: 67 IN | RESPIRATION RATE: 18 BRPM | BODY MASS INDEX: 30.01 KG/M2 | HEART RATE: 82 BPM | DIASTOLIC BLOOD PRESSURE: 56 MMHG | TEMPERATURE: 98 F | OXYGEN SATURATION: 95 %

## 2020-05-26 DIAGNOSIS — C67.9 LOCAL RECURRENCE OF CANCER OF URINARY BLADDER (HCC): ICD-10-CM

## 2020-05-26 DIAGNOSIS — C67.8 MALIGNANT NEOPLASM OF OVERLAPPING SITES OF BLADDER (HCC): Primary | ICD-10-CM

## 2020-05-26 LAB
ABSOLUTE IMMATURE GRANULOCYTE: 0.05 THOU/MM3 (ref 0–0.07)
BASINOPHIL, AUTOMATED: 0 % (ref 0–3)
BASOPHILS ABSOLUTE: 0 THOU/MM3 (ref 0–0.1)
EOSINOPHILS ABSOLUTE: 0 THOU/MM3 (ref 0–0.4)
EOSINOPHILS RELATIVE PERCENT: 1 % (ref 0–4)
HCT VFR BLD CALC: 39 % (ref 42–52)
HEMOGLOBIN: 12.8 GM/DL (ref 14–18)
IMMATURE GRANULOCYTES: 1 %
LYMPHOCYTES # BLD: 25 % (ref 15–47)
LYMPHOCYTES ABSOLUTE: 1.3 THOU/MM3 (ref 1–4.8)
MCH RBC QN AUTO: 31.4 PG (ref 26–33)
MCHC RBC AUTO-ENTMCNC: 32.8 GM/DL (ref 32.2–35.5)
MCV RBC AUTO: 96 FL (ref 80–94)
MONOCYTES ABSOLUTE: 0.6 THOU/MM3 (ref 0.4–1.3)
MONOCYTES: 12 % (ref 0–12)
PDW BLD-RTO: 13.4 % (ref 11.5–14.5)
PLATELET # BLD: 234 THOU/MM3 (ref 130–400)
PMV BLD AUTO: 8.9 FL (ref 9.4–12.4)
RBC # BLD: 4.07 MILL/MM3 (ref 4.7–6.1)
SEG NEUTROPHILS: 62 % (ref 43–75)
SEGMENTED NEUTROPHILS ABSOLUTE COUNT: 3.3 THOU/MM3 (ref 1.8–7.7)
WBC # BLD: 5.4 THOU/MM3 (ref 4.8–10.8)

## 2020-05-26 PROCEDURE — 85025 COMPLETE CBC W/AUTO DIFF WBC: CPT

## 2020-05-26 PROCEDURE — 36415 COLL VENOUS BLD VENIPUNCTURE: CPT

## 2020-05-26 PROCEDURE — 2580000003 HC RX 258: Performed by: INTERNAL MEDICINE

## 2020-05-26 PROCEDURE — 96413 CHEMO IV INFUSION 1 HR: CPT

## 2020-05-26 PROCEDURE — 6360000002 HC RX W HCPCS: Performed by: INTERNAL MEDICINE

## 2020-05-26 PROCEDURE — 96375 TX/PRO/DX INJ NEW DRUG ADDON: CPT

## 2020-05-26 RX ORDER — SODIUM CHLORIDE 9 MG/ML
20 INJECTION, SOLUTION INTRAVENOUS ONCE
Status: COMPLETED | OUTPATIENT
Start: 2020-05-26 | End: 2020-05-26

## 2020-05-26 RX ORDER — DEXAMETHASONE SODIUM PHOSPHATE 4 MG/ML
8 INJECTION, SOLUTION INTRA-ARTICULAR; INTRALESIONAL; INTRAMUSCULAR; INTRAVENOUS; SOFT TISSUE ONCE
Status: COMPLETED | OUTPATIENT
Start: 2020-05-26 | End: 2020-05-26

## 2020-05-26 RX ADMIN — GEMCITABINE 1500 MG: 38 INJECTION, SOLUTION INTRAVENOUS at 11:28

## 2020-05-26 RX ADMIN — DEXAMETHASONE SODIUM PHOSPHATE 8 MG: 4 INJECTION, SOLUTION INTRA-ARTICULAR; INTRALESIONAL; INTRAMUSCULAR; INTRAVENOUS; SOFT TISSUE at 11:02

## 2020-05-26 RX ADMIN — SODIUM CHLORIDE 20 ML/HR: 9 INJECTION, SOLUTION INTRAVENOUS at 10:57

## 2020-05-26 NOTE — ONCOLOGY
Chemotherapy Administration    Pre-assessment Data: Antineoplastic Agents  Other:   See toxicity flow sheet for assessment [x]     Physician Notification of Concerns Related to Chemotherapy Administration:   Physician Notified [place] / Time of Notification      Interventions:   Lab work assessed  [x]   Height / Weight verified for dose [x]   Current MAR reviewed [x]   Emergency drugs available as appropriate [x]   Anaphylaxis assessment completed [x]   Pre-medications administered as ordered [x]   Blood return noted upon initiation of chemotherapy [x]   Blood return noted each 1-2ml of a vesicant medication if given IV push []   Blood return noted each 2-3ml of a non-vesicant medication if given IV push []   Monitor for signs / symptoms of hypersensitivity reaction [x]   Chemotherapy orders (drug/dose/rate) verified by 2 Chemo certified RNs [x]   Monitor IV site and blood return throughout the infusion of the medication [x]   Document IV site checks on the IV assessment form [x]   Document chemotherapy teaching on the Patient Education tab [x]   Document patient verbalizes understanding of medications being administered [x]   If IV infiltration, see ONS Guidelines []   Other:      []

## 2020-05-26 NOTE — PROGRESS NOTES
Patient assessed for the following post chemotherapy:    Dizziness   No  Lightheadedness  No      Acute nausea/vomiting No  Headache   No  Chest pain/pressure  No  Rash/itching   No  Shortness of breath  No    Patient kept for 20 minutes observation post infusion chemotherapy. Patient tolerated chemotherapy treatment gemzar without any complications. Last vital signs:   BP (!) 120/56   Pulse 82   Temp 98 °F (36.7 °C) (Oral)   Resp 18   Ht 5' 7\" (1.702 m)   Wt 191 lb 3.2 oz (86.7 kg)   SpO2 95%   BMI 29.95 kg/m²       Patient instructed if experience any of the above symptoms following today's infusion, he is to notify MD immediately or go to the emergency department. Discharge instructions given to patient. Verbalizes understanding. Ambulated off unit per self with belongings.

## 2020-05-26 NOTE — PLAN OF CARE
Problem: Intellectual/Education/Knowledge Deficit  Goal: Teaching initiated upon admission  Outcome: Met This Shift  Note: Patient verbalizes understanding to verbal information given on gemzar, including action and possible side effects. Aware to call MD if develop complications. Intervention: Verbal/written education provided  Note:   Chemotherapy Teaching     What is Chemotherapy   Drug action [x]   Method of Administration [x]   Handouts given []     Side Effects  Nausea/vomiting [x]   Diarrhea [x]   Fatigue [x]   Signs / Symptoms of infection [x]   Neutropenia [x]   Thrombocytopenia [x]   Alopecia [x]   neuropathy [x]   [place] diet &  the importance of fluids [x]       Micellaneous  Importance of nutrition [x]   Importance of oral hygiene [x]   When to call the MD [x]   Monitoring labs [x]   Use of supportive services []     Explanation of Drug Regimen / Frequency  gemzar     Comments  Verbalized understanding to drug,action,side effects and when to call MD         Problem: Discharge Planning  Goal: Knowledge of discharge instructions  Description: Knowledge of discharge instructions     Outcome: Met This Shift  Note: Patient verbalizes understanding of discharge instructions, follow up appointment, and when to call physician if needed   Intervention: Discharge to appropriate level of care  Note: Discharge instructions given to pt and reviewed. Follow up appointments discussed. Problem: Falls - Risk of:  Goal: Will remain free from falls  Description: Will remain free from falls  Outcome: Met This Shift  Note: Patient free of falls this visit. Intervention: Assess risk factors for falls  Note: Fall risks assessed. Precautions discussed. Call light within reach during visit. Care plan reviewed with patient. Patient verbalizes understanding of the plan of care and contributes to goal setting.

## 2020-06-08 RX ORDER — DIPHENHYDRAMINE HYDROCHLORIDE 50 MG/ML
50 INJECTION INTRAMUSCULAR; INTRAVENOUS ONCE
Status: CANCELLED | OUTPATIENT
Start: 2020-06-16

## 2020-06-08 RX ORDER — DEXAMETHASONE SODIUM PHOSPHATE 4 MG/ML
8 INJECTION, SOLUTION INTRA-ARTICULAR; INTRALESIONAL; INTRAMUSCULAR; INTRAVENOUS; SOFT TISSUE ONCE
Status: CANCELLED | OUTPATIENT
Start: 2020-06-16

## 2020-06-08 RX ORDER — SODIUM CHLORIDE 0.9 % (FLUSH) 0.9 %
10 SYRINGE (ML) INJECTION PRN
Status: CANCELLED | OUTPATIENT
Start: 2020-06-09

## 2020-06-08 RX ORDER — HEPARIN SODIUM (PORCINE) LOCK FLUSH IV SOLN 100 UNIT/ML 100 UNIT/ML
500 SOLUTION INTRAVENOUS PRN
Status: CANCELLED | OUTPATIENT
Start: 2020-06-09

## 2020-06-08 RX ORDER — SODIUM CHLORIDE 9 MG/ML
20 INJECTION, SOLUTION INTRAVENOUS ONCE
Status: CANCELLED | OUTPATIENT
Start: 2020-06-16

## 2020-06-08 RX ORDER — HEPARIN SODIUM (PORCINE) LOCK FLUSH IV SOLN 100 UNIT/ML 100 UNIT/ML
500 SOLUTION INTRAVENOUS PRN
Status: CANCELLED | OUTPATIENT
Start: 2020-06-23

## 2020-06-08 RX ORDER — METHYLPREDNISOLONE SODIUM SUCCINATE 125 MG/2ML
125 INJECTION, POWDER, LYOPHILIZED, FOR SOLUTION INTRAMUSCULAR; INTRAVENOUS ONCE
Status: CANCELLED | OUTPATIENT
Start: 2020-06-23

## 2020-06-08 RX ORDER — DIPHENHYDRAMINE HYDROCHLORIDE 50 MG/ML
50 INJECTION INTRAMUSCULAR; INTRAVENOUS ONCE
Status: CANCELLED | OUTPATIENT
Start: 2020-06-23

## 2020-06-08 RX ORDER — SODIUM CHLORIDE 9 MG/ML
INJECTION, SOLUTION INTRAVENOUS CONTINUOUS
Status: CANCELLED | OUTPATIENT
Start: 2020-06-09

## 2020-06-08 RX ORDER — DIPHENHYDRAMINE HYDROCHLORIDE 50 MG/ML
50 INJECTION INTRAMUSCULAR; INTRAVENOUS ONCE
Status: CANCELLED | OUTPATIENT
Start: 2020-06-09

## 2020-06-08 RX ORDER — SODIUM CHLORIDE 0.9 % (FLUSH) 0.9 %
10 SYRINGE (ML) INJECTION PRN
Status: CANCELLED | OUTPATIENT
Start: 2020-06-23

## 2020-06-08 RX ORDER — SODIUM CHLORIDE 0.9 % (FLUSH) 0.9 %
5 SYRINGE (ML) INJECTION PRN
Status: CANCELLED | OUTPATIENT
Start: 2020-06-23

## 2020-06-08 RX ORDER — SODIUM CHLORIDE 9 MG/ML
20 INJECTION, SOLUTION INTRAVENOUS ONCE
Status: CANCELLED | OUTPATIENT
Start: 2020-06-23

## 2020-06-08 RX ORDER — SODIUM CHLORIDE 0.9 % (FLUSH) 0.9 %
5 SYRINGE (ML) INJECTION PRN
Status: CANCELLED | OUTPATIENT
Start: 2020-06-09

## 2020-06-08 RX ORDER — SODIUM CHLORIDE 9 MG/ML
INJECTION, SOLUTION INTRAVENOUS CONTINUOUS
Status: CANCELLED | OUTPATIENT
Start: 2020-06-16

## 2020-06-08 RX ORDER — DEXAMETHASONE SODIUM PHOSPHATE 4 MG/ML
8 INJECTION, SOLUTION INTRA-ARTICULAR; INTRALESIONAL; INTRAMUSCULAR; INTRAVENOUS; SOFT TISSUE ONCE
Status: CANCELLED | OUTPATIENT
Start: 2020-06-23

## 2020-06-08 RX ORDER — METHYLPREDNISOLONE SODIUM SUCCINATE 125 MG/2ML
125 INJECTION, POWDER, LYOPHILIZED, FOR SOLUTION INTRAMUSCULAR; INTRAVENOUS ONCE
Status: CANCELLED | OUTPATIENT
Start: 2020-06-09

## 2020-06-08 RX ORDER — SODIUM CHLORIDE 0.9 % (FLUSH) 0.9 %
10 SYRINGE (ML) INJECTION PRN
Status: CANCELLED | OUTPATIENT
Start: 2020-06-16

## 2020-06-08 RX ORDER — SODIUM CHLORIDE 9 MG/ML
INJECTION, SOLUTION INTRAVENOUS CONTINUOUS
Status: CANCELLED | OUTPATIENT
Start: 2020-06-23

## 2020-06-08 RX ORDER — HEPARIN SODIUM (PORCINE) LOCK FLUSH IV SOLN 100 UNIT/ML 100 UNIT/ML
500 SOLUTION INTRAVENOUS PRN
Status: CANCELLED | OUTPATIENT
Start: 2020-06-16

## 2020-06-08 RX ORDER — SODIUM CHLORIDE 0.9 % (FLUSH) 0.9 %
5 SYRINGE (ML) INJECTION PRN
Status: CANCELLED | OUTPATIENT
Start: 2020-06-16

## 2020-06-08 RX ORDER — METHYLPREDNISOLONE SODIUM SUCCINATE 125 MG/2ML
125 INJECTION, POWDER, LYOPHILIZED, FOR SOLUTION INTRAMUSCULAR; INTRAVENOUS ONCE
Status: CANCELLED | OUTPATIENT
Start: 2020-06-16

## 2020-06-09 ENCOUNTER — OFFICE VISIT (OUTPATIENT)
Dept: ONCOLOGY | Age: 75
End: 2020-06-09
Payer: MEDICARE

## 2020-06-09 ENCOUNTER — HOSPITAL ENCOUNTER (OUTPATIENT)
Dept: INFUSION THERAPY | Age: 75
Discharge: HOME | End: 2020-06-09
Payer: MEDICARE

## 2020-06-09 VITALS
OXYGEN SATURATION: 98 % | DIASTOLIC BLOOD PRESSURE: 63 MMHG | HEIGHT: 67 IN | RESPIRATION RATE: 18 BRPM | SYSTOLIC BLOOD PRESSURE: 136 MMHG | HEART RATE: 76 BPM | BODY MASS INDEX: 30.23 KG/M2 | WEIGHT: 192.6 LBS | TEMPERATURE: 97.5 F

## 2020-06-09 VITALS
OXYGEN SATURATION: 97 % | TEMPERATURE: 97.9 F | DIASTOLIC BLOOD PRESSURE: 56 MMHG | HEART RATE: 77 BPM | SYSTOLIC BLOOD PRESSURE: 114 MMHG | RESPIRATION RATE: 18 BRPM

## 2020-06-09 DIAGNOSIS — C67.9 UROTHELIAL CARCINOMA OF BLADDER (HCC): Primary | ICD-10-CM

## 2020-06-09 DIAGNOSIS — C67.9 LOCAL RECURRENCE OF CANCER OF URINARY BLADDER (HCC): ICD-10-CM

## 2020-06-09 DIAGNOSIS — C67.8 MALIGNANT NEOPLASM OF OVERLAPPING SITES OF BLADDER (HCC): ICD-10-CM

## 2020-06-09 DIAGNOSIS — Z51.11 ENCOUNTER FOR CHEMOTHERAPY MANAGEMENT: ICD-10-CM

## 2020-06-09 LAB
ABSOLUTE IMMATURE GRANULOCYTE: 0.03 THOU/MM3 (ref 0–0.07)
ALBUMIN SERPL-MCNC: 3.9 G/DL (ref 3.5–5.1)
ALP BLD-CCNC: 107 U/L (ref 38–126)
ALT SERPL-CCNC: 11 U/L (ref 11–66)
AST SERPL-CCNC: 16 U/L (ref 5–40)
BASINOPHIL, AUTOMATED: 0 % (ref 0–3)
BASOPHILS ABSOLUTE: 0 THOU/MM3 (ref 0–0.1)
BILIRUB SERPL-MCNC: 0.4 MG/DL (ref 0.3–1.2)
BILIRUBIN DIRECT: < 0.2 MG/DL (ref 0–0.3)
BUN BLDV-MCNC: 11 MG/DL (ref 7–22)
CHLORIDE, WHOLE BLOOD: 102 MEQ/L (ref 98–109)
CO2: 26 MEQ/L (ref 23–33)
CREATININE, WHOLE BLOOD: 0.9 MG/DL (ref 0.5–1.2)
EOSINOPHILS ABSOLUTE: 0.1 THOU/MM3 (ref 0–0.4)
EOSINOPHILS RELATIVE PERCENT: 1 % (ref 0–4)
GFR, ESTIMATED: 88 ML/MIN/1.73M2
GLUCOSE, WHOLE BLOOD: 158 MG/DL (ref 70–108)
HCT VFR BLD CALC: 38 % (ref 42–52)
HEMOGLOBIN: 12.5 GM/DL (ref 14–18)
IMMATURE GRANULOCYTES: 0 %
IONIZED CALCIUM, WHOLE BLOOD: 1.23 MMOL/L (ref 1.12–1.32)
LYMPHOCYTES # BLD: 17 % (ref 15–47)
LYMPHOCYTES ABSOLUTE: 1.3 THOU/MM3 (ref 1–4.8)
MCH RBC QN AUTO: 31.7 PG (ref 26–33)
MCHC RBC AUTO-ENTMCNC: 32.9 GM/DL (ref 32.2–35.5)
MCV RBC AUTO: 96 FL (ref 80–94)
MONOCYTES ABSOLUTE: 0.6 THOU/MM3 (ref 0.4–1.3)
MONOCYTES: 8 % (ref 0–12)
PDW BLD-RTO: 15.1 % (ref 11.5–14.5)
PLATELET # BLD: 161 THOU/MM3 (ref 130–400)
PMV BLD AUTO: 9.1 FL (ref 9.4–12.4)
POTASSIUM, WHOLE BLOOD: 3.6 MEQ/L (ref 3.5–4.9)
RBC # BLD: 3.94 MILL/MM3 (ref 4.7–6.1)
SEG NEUTROPHILS: 74 % (ref 43–75)
SEGMENTED NEUTROPHILS ABSOLUTE COUNT: 5.6 THOU/MM3 (ref 1.8–7.7)
SODIUM, WHOLE BLOOD: 141 MEQ/L (ref 138–146)
TOTAL PROTEIN: 6.3 G/DL (ref 6.1–8)
WBC # BLD: 7.5 THOU/MM3 (ref 4.8–10.8)

## 2020-06-09 PROCEDURE — 4040F PNEUMOC VAC/ADMIN/RCVD: CPT | Performed by: PHYSICIAN ASSISTANT

## 2020-06-09 PROCEDURE — 2580000003 HC RX 258: Performed by: INTERNAL MEDICINE

## 2020-06-09 PROCEDURE — 96417 CHEMO IV INFUS EACH ADDL SEQ: CPT

## 2020-06-09 PROCEDURE — 1123F ACP DISCUSS/DSCN MKR DOCD: CPT | Performed by: PHYSICIAN ASSISTANT

## 2020-06-09 PROCEDURE — G8427 DOCREV CUR MEDS BY ELIG CLIN: HCPCS | Performed by: PHYSICIAN ASSISTANT

## 2020-06-09 PROCEDURE — 85025 COMPLETE CBC W/AUTO DIFF WBC: CPT

## 2020-06-09 PROCEDURE — 96375 TX/PRO/DX INJ NEW DRUG ADDON: CPT

## 2020-06-09 PROCEDURE — 96367 TX/PROPH/DG ADDL SEQ IV INF: CPT

## 2020-06-09 PROCEDURE — 84520 ASSAY OF UREA NITROGEN: CPT

## 2020-06-09 PROCEDURE — 4004F PT TOBACCO SCREEN RCVD TLK: CPT | Performed by: PHYSICIAN ASSISTANT

## 2020-06-09 PROCEDURE — 36415 COLL VENOUS BLD VENIPUNCTURE: CPT

## 2020-06-09 PROCEDURE — 80076 HEPATIC FUNCTION PANEL: CPT

## 2020-06-09 PROCEDURE — 99214 OFFICE O/P EST MOD 30 MIN: CPT | Performed by: PHYSICIAN ASSISTANT

## 2020-06-09 PROCEDURE — 96366 THER/PROPH/DIAG IV INF ADDON: CPT

## 2020-06-09 PROCEDURE — 96415 CHEMO IV INFUSION ADDL HR: CPT

## 2020-06-09 PROCEDURE — 80047 BASIC METABLC PNL IONIZED CA: CPT

## 2020-06-09 PROCEDURE — 6360000002 HC RX W HCPCS: Performed by: INTERNAL MEDICINE

## 2020-06-09 PROCEDURE — 82374 ASSAY BLOOD CARBON DIOXIDE: CPT

## 2020-06-09 PROCEDURE — G8417 CALC BMI ABV UP PARAM F/U: HCPCS | Performed by: PHYSICIAN ASSISTANT

## 2020-06-09 PROCEDURE — 99211 OFF/OP EST MAY X REQ PHY/QHP: CPT

## 2020-06-09 PROCEDURE — 3017F COLORECTAL CA SCREEN DOC REV: CPT | Performed by: PHYSICIAN ASSISTANT

## 2020-06-09 PROCEDURE — 96413 CHEMO IV INFUSION 1 HR: CPT

## 2020-06-09 RX ORDER — SODIUM CHLORIDE 0.9 % (FLUSH) 0.9 %
20 SYRINGE (ML) INJECTION PRN
Status: CANCELLED | OUTPATIENT
Start: 2020-06-09

## 2020-06-09 RX ORDER — HEPARIN SODIUM (PORCINE) LOCK FLUSH IV SOLN 100 UNIT/ML 100 UNIT/ML
500 SOLUTION INTRAVENOUS PRN
Status: CANCELLED | OUTPATIENT
Start: 2020-06-09

## 2020-06-09 RX ORDER — PALONOSETRON 0.05 MG/ML
0.25 INJECTION, SOLUTION INTRAVENOUS ONCE
Status: COMPLETED | OUTPATIENT
Start: 2020-06-09 | End: 2020-06-09

## 2020-06-09 RX ORDER — ERYTHROMYCIN 5 MG/G
OINTMENT OPHTHALMIC
Qty: 1 G | Refills: 1 | Status: SHIPPED | OUTPATIENT
Start: 2020-06-09 | End: 2020-06-23 | Stop reason: SDUPTHER

## 2020-06-09 RX ORDER — SODIUM CHLORIDE 0.9 % (FLUSH) 0.9 %
10 SYRINGE (ML) INJECTION PRN
Status: CANCELLED | OUTPATIENT
Start: 2020-06-09

## 2020-06-09 RX ORDER — SODIUM CHLORIDE 9 MG/ML
20 INJECTION, SOLUTION INTRAVENOUS ONCE
Status: COMPLETED | OUTPATIENT
Start: 2020-06-09 | End: 2020-06-09

## 2020-06-09 RX ADMIN — MANNITOL: 250 INJECTION, SOLUTION INTRAVENOUS at 11:53

## 2020-06-09 RX ADMIN — SODIUM CHLORIDE 20 ML/HR: 9 INJECTION, SOLUTION INTRAVENOUS at 08:55

## 2020-06-09 RX ADMIN — POTASSIUM CHLORIDE: 2 INJECTION, SOLUTION, CONCENTRATE INTRAVENOUS at 15:05

## 2020-06-09 RX ADMIN — DEXAMETHASONE SODIUM PHOSPHATE 12 MG: 4 INJECTION, SOLUTION INTRA-ARTICULAR; INTRALESIONAL; INTRAMUSCULAR; INTRAVENOUS; SOFT TISSUE at 10:09

## 2020-06-09 RX ADMIN — POTASSIUM CHLORIDE: 2 INJECTION, SOLUTION, CONCENTRATE INTRAVENOUS at 08:56

## 2020-06-09 RX ADMIN — PALONOSETRON HYDROCHLORIDE 0.25 MG: 0.25 INJECTION, SOLUTION INTRAVENOUS at 10:05

## 2020-06-09 RX ADMIN — SODIUM CHLORIDE 150 MG: 9 INJECTION, SOLUTION INTRAVENOUS at 10:29

## 2020-06-09 RX ADMIN — GEMCITABINE 1600 MG: 38 INJECTION, SOLUTION INTRAVENOUS at 11:06

## 2020-06-09 NOTE — PROGRESS NOTES
side.  The largest tumor was 6 cm in size. Biopsy showed invasive high-grade urothelial carcinoma, clear-cell variant.  Deep smooth muscle was present and involved by carcinoma. He received recommendation to proceed with bladder surgery. The patient is refusing surgery at this point of time, he would like to see what to neoadjuvant chemotherapy will due to his bladder cancer. He started neoadjuvant chemotherapy with Gemzar and cisplatin on April 28, 2020      Interval History 6/9/2020: The patient is here for follow up evaluation and to receive cycle #2 of neoadjuvant chemotherapy with Gemzar and cisplatin. Patient states he tolerated tolerated cycle #1 of chemotherapy well. He denies significant side effects. He denies fever, chills, or signs or symptoms of infection. He denies chest pain, shortness of breath, cough, abdominal pain, nausea, vomiting, bowel changes. He reports no recent episodes of hematuria. Denies any other abnormal bleeding.       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     SINCE CHILDHOOD    Thyroid disease     Type 2 diabetes mellitus without complication ([facility] [address]       Past Surgical History:   Procedure Laterality Date    CYSTOSCOPY N/A 1/30/2019    TRANSURETHRAL RESECTION BLADDER TUMOR, CYSTOLITHOLAPAXY performed by Ivelisse Hoover MD at [address] 3/31/2020    CYSTOSCOPY WITH TRANSURETHRAL RESECTION OF BLADDER TUMOR REMOVAL OF BLADDER STONE performed by Fco Boyce MD at [address] [facility] as kid    TONSILLECTOMY        Family History   Problem Relation Age of Onset    Other Mother         BRAIN TUMOR    Stroke Mother     Heart Disease Father     High Blood Pressure Father     Cancer Sister  fosaprepitant (EMEND) 150 mg in sodium chloride 0.9 % 250 mL IVPB  150 mg Intravenous Once Francisco Bill MD        palonosetron (ALOXI) injection 0.25 mg  0.25 mg Intravenous Once Francisco Bill MD        dexamethasone (DECADRON) 12 mg in sodium chloride 0.9 % IVPB  12 mg Intravenous Once Francisco Bill MD        potassium chloride 10 mEq, magnesium sulfate 1 g in sodium chloride 0.9 % 500 mL IVPB   Intravenous Once Francisco Bill MD          No Known Allergies       Review of Systems:   Review of Systems   Pertinent review of systems noted in HPI, all other ROS negative. Objective:   Physical Exam   /63 (Site: Left Upper Arm, Position: Sitting, Cuff Size: Medium Adult)   Pulse 76   Temp 97.5 °F (36.4 °C) (Oral)   Resp 18   Ht 5' 7\" (1.702 m)   Wt 192 lb 9.6 oz (87.4 kg)   SpO2 98%   BMI 30.17 kg/m²    General appearance: No apparent distress, chronically ill appearing, and cooperative. HEENT: Pupil equal, round, and reactive to light to the right eye. Left eye with chronic glaucoma/cataract changes. Oral mucosa moist.   Neck: Supple, with full range of motion. Trachea midline. Respiratory:  Normal respiratory effort. Clear to auscultation, bilaterally without Rales/Wheezes/Rhonchi. Cardiovascular: Regular rate and rhythm with normal S1/S2   Abdomen: Soft, non-tender, non-distended with active bowel sounds. Musculoskeletal: No clubbing, cyanosis or edema bilaterally. Full range of motion without deformity. Skin: Skin color, texture, turgor normal.  No rashes or lesions. Neurologic:  Neurovascularly intact without any focal sensory/motor deficits.    Psychiatric: Alert and oriented      Imaging Studies and Labs:   CBC:   Lab Results   Component Value Date    WBC 7.5 06/09/2020    HGB 12.5 (L) 06/09/2020    HCT 38.0 (L) 06/09/2020    MCV 96 (H) 06/09/2020     06/09/2020     BMP:   Lab Results   Component Value Date     06/09/2020     04/08/2020    K 3.6 06/09/2020    K 5.2 Chemotherapy with Gemzar and Cisplatin  He began cycle #1 of Cisplatin and Gemzar on 4/28/20. His cycle #1, day 15 was delayed due to neutropenia and thrombocytopenia and he received chemo on 5/26/20 with dose reduction of Gemzar from 1000 mg/m² to 750 mg/m². Marijean Fallow Today the patient is feeling well. Vitals and labs reviewed, will proceed with cycle #2 of chemotherapy today. Return to [facility] in one week for chemotherapy. Labs on RTC: CBC, BMP, LFT  3. Return to clinic in 2 weeks with Dr. Leo Moon and for chemotherapy. Labs on RTC: CBC, BMP, LFT         All patient questions answered. Pt voiced understanding. Patient agreed with treatment plan. Follow up as directed. Patient instructed to call for questions or concerns.     Electronically signed by   Negin Smith PA-C

## 2020-06-09 NOTE — PLAN OF CARE
Problem: Intellectual/Education/Knowledge Deficit  Goal: Teaching initiated upon admission  Outcome: Met This Shift  Note: Patient verbalizes understanding to verbal information given on Gemzar /Cisplatin ,action and possible side effects. Aware to call MD if develop complications. Intervention: Verbal/written education provided  Note:   Chemotherapy Teaching     What is Chemotherapy   Drug action [x]   Method of Administration [x]   Handouts given []     Side Effects  Nausea/vomiting [x]   Diarrhea [x]   Fatigue [x]   Signs / Symptoms of infection [x]   Neutropenia [x]   Thrombocytopenia [x]   Alopecia [x]   neuropathy [x]   [place] diet &  the importance of fluids [x]       Micellaneous  Importance of nutrition [x]   Importance of oral hygiene [x]   When to call the MD [x]   Monitoring labs [x]   Use of supportive services []     Explanation of Drug Regimen / Frequency  Gemzar/Cisplatin:  D1C2     Comments  Verbalized understanding to drug,action,side effects and when to call MD         Problem: Discharge Planning  Goal: Knowledge of discharge instructions  Description: Knowledge of discharge instructions     Outcome: Met This Shift  Note: Verbalized understanding of discharge instructions, follow-up appointments, and when to call the physician. Intervention: Discharge to appropriate level of care  Note: Discuss understanding of discharge instructions,follow-up appointments, and when to call the physician. Problem: Falls - Risk of:  Goal: Will remain free from falls  Description: Will remain free from falls  Outcome: Met This Shift  Note: Patient free from falls while in O.P. Oncology. Intervention: Assess risk factors for fallsDescription: Assess risk factors for falls  Note: Patient assessed for fall risk on admission to [address] [place] [address]. Fall band placed on patient. Discussed the need to use the call light for assistance prior to getting up out of chair/bed. Care plan reviewed with patient. Patient verbalize understanding of the plan of care and contribute to goal setting.

## 2020-06-09 NOTE — PROGRESS NOTES
Patient assessed for the following post chemotherapy:    Dizziness   No  Lightheadedness  No      Acute nausea/vomiting No  Headache   No  Chest pain/pressure  No  Rash/itching   No  Shortness of breath  No    Patient kept for 20 minutes observation post infusion chemotherapy. Patient tolerated chemotherapy treatment Gemzar /Cisplatin without any complications. Last vital signs:   BP (!) 114/56   Pulse 77   Temp 97.9 °F (36.6 °C) (Oral)   Resp 18   SpO2 97%     Patient instructed if experience any of the above symptoms following today's infusion, he is to notify MD immediately or go to the emergency department. Discharge instructions given to patient. Verbalizes understanding. Ambulated off unit per self, with belongings.

## 2020-06-09 NOTE — ONCOLOGY
Chemotherapy Administration    Pre-assessment Data: Antineoplastic Agents  Other:   See toxicity flow sheet for assessment [x]     Physician Notification of Concerns Related to Chemotherapy Administration:   Physician Notified Hortensia Mitchell / Time of Notification      Interventions:   Lab work assessed  [x]   Height / Weight verified for dose [x]   Current MAR reviewed [x]   Emergency drugs available as appropriate [x]   Anaphylaxis assessment completed [x]   Pre-medications administered as ordered [x]   Blood return noted upon initiation of chemotherapy [x]   Blood return noted each 1-2ml of a vesicant medication if given IV push []   Blood return noted each 2-3ml of a non-vesicant medication if given IV push []   Monitor for signs / symptoms of hypersensitivity reaction [x]   Chemotherapy orders (drug/dose/rate) verified by 2 Chemo certified RNs [x]   Monitor IV site and blood return throughout the infusion of the medication [x]   Document IV site checks on the IV assessment form [x]   Document chemotherapy teaching on the Patient Education tab [x]   Document patient verbalizes understanding of medications being administered [x]   If IV infiltration, see ONS Guidelines []   Other:     Gemzar /Cisplatin []

## 2020-06-09 NOTE — PATIENT INSTRUCTIONS
1. Okay to proceed with chemotherapy today. 2. Return to [facility] in one week for chemotherapy. Labs on RTC: CBC, BMP, LFT  3. Return to clinic in 2 weeks with Dr. Ara Simmons and for chemotherapy. Labs on RTC: CBC, BMP, LFT  4. Please call for questions or concerns.

## 2020-06-16 ENCOUNTER — HOSPITAL ENCOUNTER (OUTPATIENT)
Dept: INFUSION THERAPY | Age: 75
Discharge: HOME | End: 2020-06-16
Payer: MEDICARE

## 2020-06-16 VITALS
HEIGHT: 67 IN | TEMPERATURE: 98.3 F | RESPIRATION RATE: 16 BRPM | DIASTOLIC BLOOD PRESSURE: 71 MMHG | OXYGEN SATURATION: 98 % | SYSTOLIC BLOOD PRESSURE: 119 MMHG | WEIGHT: 189 LBS | HEART RATE: 72 BPM | BODY MASS INDEX: 29.66 KG/M2

## 2020-06-16 DIAGNOSIS — Z51.11 ENCOUNTER FOR CHEMOTHERAPY MANAGEMENT: ICD-10-CM

## 2020-06-16 DIAGNOSIS — C67.9 LOCAL RECURRENCE OF CANCER OF URINARY BLADDER (HCC): ICD-10-CM

## 2020-06-16 DIAGNOSIS — C67.8 MALIGNANT NEOPLASM OF OVERLAPPING SITES OF BLADDER (HCC): ICD-10-CM

## 2020-06-16 DIAGNOSIS — C67.9 UROTHELIAL CARCINOMA OF BLADDER (HCC): Primary | ICD-10-CM

## 2020-06-16 LAB
ABSOLUTE IMMATURE GRANULOCYTE: 0.02 THOU/MM3 (ref 0–0.07)
ALBUMIN SERPL-MCNC: 3.9 G/DL (ref 3.5–5.1)
ALP BLD-CCNC: 100 U/L (ref 38–126)
ALT SERPL-CCNC: 15 U/L (ref 11–66)
AST SERPL-CCNC: 15 U/L (ref 5–40)
BASINOPHIL, AUTOMATED: 1 % (ref 0–3)
BASOPHILS ABSOLUTE: 0 THOU/MM3 (ref 0–0.1)
BILIRUB SERPL-MCNC: 0.4 MG/DL (ref 0.3–1.2)
BILIRUBIN DIRECT: < 0.2 MG/DL (ref 0–0.3)
BUN BLDV-MCNC: 13 MG/DL (ref 7–22)
CHLORIDE, WHOLE BLOOD: 102 MEQ/L (ref 98–109)
CO2: 29 MEQ/L (ref 23–33)
CREATININE, WHOLE BLOOD: 0.9 MG/DL (ref 0.5–1.2)
EOSINOPHILS ABSOLUTE: 0.1 THOU/MM3 (ref 0–0.4)
EOSINOPHILS RELATIVE PERCENT: 2 % (ref 0–4)
GFR, ESTIMATED: 88 ML/MIN/1.73M2
GLUCOSE, WHOLE BLOOD: 145 MG/DL (ref 70–108)
HCT VFR BLD CALC: 36.9 % (ref 42–52)
HEMOGLOBIN: 12.2 GM/DL (ref 14–18)
IMMATURE GRANULOCYTES: 1 %
IONIZED CALCIUM, WHOLE BLOOD: 1.23 MMOL/L (ref 1.12–1.32)
LYMPHOCYTES # BLD: 36 % (ref 15–47)
LYMPHOCYTES ABSOLUTE: 1.2 THOU/MM3 (ref 1–4.8)
MCH RBC QN AUTO: 31.4 PG (ref 26–33)
MCHC RBC AUTO-ENTMCNC: 33.1 GM/DL (ref 32.2–35.5)
MCV RBC AUTO: 95 FL (ref 80–94)
MONOCYTES ABSOLUTE: 0.1 THOU/MM3 (ref 0.4–1.3)
MONOCYTES: 4 % (ref 0–12)
PDW BLD-RTO: 14.4 % (ref 11.5–14.5)
PLATELET # BLD: 154 THOU/MM3 (ref 130–400)
PMV BLD AUTO: 9.5 FL (ref 9.4–12.4)
POTASSIUM, WHOLE BLOOD: 3.8 MEQ/L (ref 3.5–4.9)
RBC # BLD: 3.89 MILL/MM3 (ref 4.7–6.1)
SEG NEUTROPHILS: 57 % (ref 43–75)
SEGMENTED NEUTROPHILS ABSOLUTE COUNT: 1.8 THOU/MM3 (ref 1.8–7.7)
SODIUM, WHOLE BLOOD: 141 MEQ/L (ref 138–146)
TOTAL PROTEIN: 6.6 G/DL (ref 6.1–8)
WBC # BLD: 3.2 THOU/MM3 (ref 4.8–10.8)

## 2020-06-16 PROCEDURE — 96375 TX/PRO/DX INJ NEW DRUG ADDON: CPT

## 2020-06-16 PROCEDURE — 6360000002 HC RX W HCPCS: Performed by: INTERNAL MEDICINE

## 2020-06-16 PROCEDURE — 85025 COMPLETE CBC W/AUTO DIFF WBC: CPT

## 2020-06-16 PROCEDURE — 80076 HEPATIC FUNCTION PANEL: CPT

## 2020-06-16 PROCEDURE — 2580000003 HC RX 258: Performed by: INTERNAL MEDICINE

## 2020-06-16 PROCEDURE — 36415 COLL VENOUS BLD VENIPUNCTURE: CPT

## 2020-06-16 PROCEDURE — 84520 ASSAY OF UREA NITROGEN: CPT

## 2020-06-16 PROCEDURE — 96413 CHEMO IV INFUSION 1 HR: CPT

## 2020-06-16 PROCEDURE — 82374 ASSAY BLOOD CARBON DIOXIDE: CPT

## 2020-06-16 PROCEDURE — 80047 BASIC METABLC PNL IONIZED CA: CPT

## 2020-06-16 RX ORDER — SODIUM CHLORIDE 9 MG/ML
20 INJECTION, SOLUTION INTRAVENOUS ONCE
Status: COMPLETED | OUTPATIENT
Start: 2020-06-16 | End: 2020-06-16

## 2020-06-16 RX ORDER — DEXAMETHASONE SODIUM PHOSPHATE 4 MG/ML
8 INJECTION, SOLUTION INTRA-ARTICULAR; INTRALESIONAL; INTRAMUSCULAR; INTRAVENOUS; SOFT TISSUE ONCE
Status: COMPLETED | OUTPATIENT
Start: 2020-06-16 | End: 2020-06-16

## 2020-06-16 RX ADMIN — GEMCITABINE 1600 MG: 38 INJECTION, SOLUTION INTRAVENOUS at 08:47

## 2020-06-16 RX ADMIN — DEXAMETHASONE SODIUM PHOSPHATE 8 MG: 4 INJECTION, SOLUTION INTRA-ARTICULAR; INTRALESIONAL; INTRAMUSCULAR; INTRAVENOUS; SOFT TISSUE at 08:42

## 2020-06-16 RX ADMIN — SODIUM CHLORIDE 20 ML/HR: 9 INJECTION, SOLUTION INTRAVENOUS at 08:38

## 2020-06-16 NOTE — ONCOLOGY
Chemotherapy Administration    Pre-assessment Data: Antineoplastic Agents  Other:   See toxicity flow sheet for assessment [x]     Physician Notification of Concerns Related to Chemotherapy Administration:   Physician Notified Aundrea Hoffman / Time of Notification      Interventions:   Lab work assessed  [x]   Height / Weight verified for dose [x]   Current MAR reviewed [x]   Emergency drugs available as appropriate [x]   Anaphylaxis assessment completed [x]   Pre-medications administered as ordered [x]   Blood return noted upon initiation of chemotherapy [x]   Blood return noted each 1-2ml of a vesicant medication if given IV push []   Blood return noted each 2-3ml of a non-vesicant medication if given IV push []   Monitor for signs / symptoms of hypersensitivity reaction [x]   Chemotherapy orders (drug/dose/rate) verified by 2 Chemo certified RNs [x]   Monitor IV site and blood return throughout the infusion of the medication [x]   Document IV site checks on the IV assessment form [x]   Document chemotherapy teaching on the Patient Education tab [x]   Document patient verbalizes understanding of medications being administered- Gemzar [x]   If IV infiltration, see ONS Guidelines []   Other:      []

## 2020-06-16 NOTE — PROGRESS NOTES
Patient assessed for the following post chemotherapy:    Dizziness   No  Lightheadedness  No      Acute nausea/vomiting No  Headache   No  Chest pain/pressure  No  Rash/itching   No  Shortness of breath  No    Patient kept for 20 minutes observation post infusion chemotherapy. Patient tolerated chemotherapy treatment Gemzar without any complications. Last vital signs:   /71   Pulse 72   Temp 98.3 °F (36.8 °C) (Oral)   Resp 16   Ht 5' 7\" (1.702 m)   Wt 189 lb (85.7 kg)   SpO2 98%   BMI 29.60 kg/m²       Patient instructed if experience any of the above symptoms following today's infusion,he/she is to notify MD immediately or go to the emergency department. Discharge instructions given to patient. Verbalizes understanding. Ambulated off unit per self in stable condition with belongings.

## 2020-06-16 NOTE — PLAN OF CARE
Problem: Intellectual/Education/Knowledge Deficit  Goal: Teaching initiated upon admission  Outcome: Met This Shift  Note: Patient verbalizes understanding to verbal information given on Gemzar,action and possible side effects. Aware to call MD if develop complications. Intervention: Verbal/written education provided  Note:   Chemotherapy Teaching     What is Chemotherapy   Drug action [x]   Method of Administration [x]   Handouts given []     Side Effects  Nausea/vomiting [x]   Diarrhea [x]   Fatigue [x]   Signs / Symptoms of infection [x]   Neutropenia [x]   Thrombocytopenia [x]   Alopecia [x]   neuropathy [x]   [place] diet &  the importance of fluids [x]       Micellaneous  Importance of nutrition [x]   Importance of oral hygiene [x]   When to call the MD [x]   Monitoring labs [x]   Use of supportive services []     Explanation of Drug Regimen / Frequency  Day 8 cycle 2 Gemzar     Comments  Verbalized understanding to drug,action,side effects and when to call MD         Problem: Discharge Planning  Goal: Knowledge of discharge instructions  Description: Knowledge of discharge instructions     Outcome: Met This Shift  Note: Verbalize understanding of discharge instructions, follow up appointments, and when to call Physician. Intervention: Discharge to appropriate level of care  Note: Discuss understanding of discharge instructions, follow up appointments and when to call Physician. Problem: Falls - Risk of:  Goal: Will remain free from falls  Description: Will remain free from falls  Outcome: Met This Shift  Note: Free from falls while in O.P. Oncology. Intervention: Assess risk factors for falls  Note: Discussed the need to use the call light for assistance when getting up to ambulate. Care plan reviewed with patient. Patient verbalize understanding of the plan of care and contribute to goal setting.

## 2020-06-22 ASSESSMENT — ENCOUNTER SYMPTOMS
ABDOMINAL PAIN: 0
WHEEZING: 0
EYE DISCHARGE: 0
SORE THROAT: 0
COLOR CHANGE: 0
ABDOMINAL DISTENTION: 0
NAUSEA: 0
TROUBLE SWALLOWING: 0
RECTAL PAIN: 0
SHORTNESS OF BREATH: 0
BACK PAIN: 0
DIARRHEA: 0
CHEST TIGHTNESS: 0
BLOOD IN STOOL: 0
VOMITING: 0
FACIAL SWELLING: 0
COUGH: 0
CONSTIPATION: 0

## 2020-06-23 ENCOUNTER — OFFICE VISIT (OUTPATIENT)
Dept: ONCOLOGY | Age: 75
End: 2020-06-23
Payer: MEDICARE

## 2020-06-23 ENCOUNTER — HOSPITAL ENCOUNTER (OUTPATIENT)
Dept: INFUSION THERAPY | Age: 75
Discharge: HOME | End: 2020-06-23
Payer: MEDICARE

## 2020-06-23 VITALS
OXYGEN SATURATION: 95 % | RESPIRATION RATE: 18 BRPM | BODY MASS INDEX: 29.51 KG/M2 | SYSTOLIC BLOOD PRESSURE: 130 MMHG | TEMPERATURE: 97.3 F | DIASTOLIC BLOOD PRESSURE: 72 MMHG | HEIGHT: 67 IN | HEART RATE: 86 BPM | WEIGHT: 188 LBS

## 2020-06-23 DIAGNOSIS — C67.9 LOCAL RECURRENCE OF CANCER OF URINARY BLADDER (HCC): ICD-10-CM

## 2020-06-23 DIAGNOSIS — Z51.11 ENCOUNTER FOR CHEMOTHERAPY MANAGEMENT: ICD-10-CM

## 2020-06-23 DIAGNOSIS — C67.9 UROTHELIAL CARCINOMA OF BLADDER (HCC): ICD-10-CM

## 2020-06-23 LAB
ABSOLUTE IMMATURE GRANULOCYTE: 0 THOU/MM3 (ref 0–0.07)
ALBUMIN SERPL-MCNC: 3.6 G/DL (ref 3.5–5.1)
ALP BLD-CCNC: 114 U/L (ref 38–126)
ALT SERPL-CCNC: 12 U/L (ref 11–66)
ANION GAP SERPL CALCULATED.3IONS-SCNC: 11 MEQ/L (ref 8–16)
AST SERPL-CCNC: 15 U/L (ref 5–40)
BASINOPHIL, AUTOMATED: 0 % (ref 0–3)
BASOPHILS ABSOLUTE: 0 THOU/MM3 (ref 0–0.1)
BILIRUB SERPL-MCNC: 0.3 MG/DL (ref 0.3–1.2)
BILIRUBIN DIRECT: < 0.2 MG/DL (ref 0–0.3)
BUN BLDV-MCNC: 10 MG/DL (ref 7–22)
CALCIUM SERPL-MCNC: 9.4 MG/DL (ref 8.5–10.5)
CHLORIDE BLD-SCNC: 104 MEQ/L (ref 98–111)
CO2: 25 MEQ/L (ref 23–33)
CREAT SERPL-MCNC: 0.8 MG/DL (ref 0.4–1.2)
EOSINOPHILS ABSOLUTE: 0 THOU/MM3 (ref 0–0.4)
EOSINOPHILS RELATIVE PERCENT: 2 % (ref 0–4)
GFR SERPL CREATININE-BSD FRML MDRD: > 90 ML/MIN/1.73M2
GFR, ESTIMATED: > 90 ML/MIN/1.73M2
GLUCOSE BLD-MCNC: 154 MG/DL (ref 70–108)
HCT VFR BLD CALC: 31.3 % (ref 42–52)
HEMOGLOBIN: 10.7 GM/DL (ref 14–18)
IMMATURE GRANULOCYTES: 0 %
LYMPHOCYTES # BLD: 56 % (ref 15–47)
LYMPHOCYTES ABSOLUTE: 1.1 THOU/MM3 (ref 1–4.8)
MCH RBC QN AUTO: 32.3 PG (ref 26–33)
MCHC RBC AUTO-ENTMCNC: 34.2 GM/DL (ref 32.2–35.5)
MCV RBC AUTO: 95 FL (ref 80–94)
MONOCYTES ABSOLUTE: 0.1 THOU/MM3 (ref 0.4–1.3)
MONOCYTES: 3 % (ref 0–12)
PDW BLD-RTO: 14.1 % (ref 11.5–14.5)
PLATELET # BLD: 32 THOU/MM3 (ref 130–400)
PMV BLD AUTO: 10.4 FL (ref 9.4–12.4)
POTASSIUM SERPL-SCNC: 4.3 MEQ/L (ref 3.5–5.2)
RBC # BLD: 3.31 MILL/MM3 (ref 4.7–6.1)
SEG NEUTROPHILS: 39 % (ref 43–75)
SEGMENTED NEUTROPHILS ABSOLUTE COUNT: 0.7 THOU/MM3 (ref 1.8–7.7)
SODIUM BLD-SCNC: 140 MEQ/L (ref 135–145)
TOTAL PROTEIN: 6.1 G/DL (ref 6.1–8)
WBC # BLD: 1.9 THOU/MM3 (ref 4.8–10.8)

## 2020-06-23 PROCEDURE — 99214 OFFICE O/P EST MOD 30 MIN: CPT | Performed by: INTERNAL MEDICINE

## 2020-06-23 PROCEDURE — 80047 BASIC METABLC PNL IONIZED CA: CPT

## 2020-06-23 PROCEDURE — 36415 COLL VENOUS BLD VENIPUNCTURE: CPT

## 2020-06-23 PROCEDURE — 3017F COLORECTAL CA SCREEN DOC REV: CPT | Performed by: INTERNAL MEDICINE

## 2020-06-23 PROCEDURE — 1123F ACP DISCUSS/DSCN MKR DOCD: CPT | Performed by: INTERNAL MEDICINE

## 2020-06-23 PROCEDURE — 82248 BILIRUBIN DIRECT: CPT

## 2020-06-23 PROCEDURE — 80053 COMPREHEN METABOLIC PANEL: CPT

## 2020-06-23 PROCEDURE — G8417 CALC BMI ABV UP PARAM F/U: HCPCS | Performed by: INTERNAL MEDICINE

## 2020-06-23 PROCEDURE — 99211 OFF/OP EST MAY X REQ PHY/QHP: CPT

## 2020-06-23 PROCEDURE — 85025 COMPLETE CBC W/AUTO DIFF WBC: CPT

## 2020-06-23 PROCEDURE — 4040F PNEUMOC VAC/ADMIN/RCVD: CPT | Performed by: INTERNAL MEDICINE

## 2020-06-23 PROCEDURE — 4004F PT TOBACCO SCREEN RCVD TLK: CPT | Performed by: INTERNAL MEDICINE

## 2020-06-23 PROCEDURE — G8428 CUR MEDS NOT DOCUMENT: HCPCS | Performed by: INTERNAL MEDICINE

## 2020-06-23 RX ORDER — ERYTHROMYCIN 5 MG/G
OINTMENT OPHTHALMIC
Qty: 1 G | Refills: 1 | Status: SHIPPED | OUTPATIENT
Start: 2020-06-23 | End: 2020-08-25

## 2020-06-23 NOTE — PROGRESS NOTES
[facility], [address] [facility] ([place], [place]) [address]  [address] [address]  Dept: [phone]  Dept Fax: [phone]  Loc: [phone]    Visit Date:6/23/2020     Jovi Hopkins is a 76 y.o. male who presents today for:   Chief Complaint   Patient presents with    Follow-up        HPI:   This is a 42-year-old man with muscle invasive bladder cancer. He had cystoscopy in August 2019 patient developed gross hematuria and flank pain end of December 2018. He had CT urogram on December 13, 2018 that showed irregular thickening of the right posterior lateral bladder wall. Findings were highly suspicious for the presence of malignancy. On January 28, 2019 the patient underwent cystoscopy by Dr. Ghazal Crespo. It showed bladder trabeculations and bladder stone, on the right side there was a diverticulum with a bladder stone and bladder tumor inside. On January 30, 2019 the patient underwent TURBT. Final pathology report showed invasive high-grade urothelial carcinoma, clear cell variant. Deep smooth muscle was involved by carcinoma. Dr. Ghazal Crespo recommended neoadjuvant chemotherapy before bladder resection. However, the patient was absolutely opposed to having a bladder surgery. He agreed to concurrent chemoradiation with understanding that outcome is inferior to neoadjuvant chemo and surgery. He started concurrent chemoradiation on March 18, 2019 and completed on May 15, 2019. Overall he tolerated treatment with cisplatin well. He had MRI of the pelvis done approximately 4 weeks from completion of concurrent chemoradiation, it showed nice response to treatment. He had cystoscopy in August 2019 that did not show residual tumor. I saw the patient last time in October 2019. On March 31, 2020 due to recurrent hematuria, the patient had cystoscopy which showed tumor along the rim of the bladder diverticulum which was located on the right side.  The largest tumor was 6 cm in size.  Biopsy showed Every Day Smoker     Packs/day: 1.00     Years: 53.00     Pack years: 53.00     Types: Cigarettes    Smokeless tobacco: Never Used    Tobacco comment: depending on tumor analysis   Substance Use Topics    Alcohol use: No     Comment: not in 40 years      Current Outpatient Medications   Medication Sig Dispense Refill    erythromycin (ROMYCIN) 5 MG/GM ophthalmic ointment apply to the left eye twice a day 1 g 1    CHANTIX CONTINUING MONTH SAV 1 MG tablet daily      linagliptin (TRADJENTA) 5 MG tablet Take 5 mg by mouth daily      Multiple Vitamins-Minerals ([employer] SPECTRAVITE ADULT 50+ PO) Take by mouth      Saw [place] 450 MG CAPS Take by mouth daily Indications: 3-4 times per week PRN       atorvastatin (LIPITOR) 40 MG tablet Take 40 mg by mouth daily      metFORMIN (GLUCOPHAGE) 500 MG tablet Take 500 mg by mouth 4 times daily       levothyroxine (SYNTHROID) 125 MCG tablet Take 125 mcg by mouth Daily      prednisoLONE acetate (PRED FORTE) 1 % ophthalmic suspension 1 drop 4 times daily      timolol (BETIMOL) 0.5 % ophthalmic solution 1 drop 2 times daily      brimonidine (ALPHAGAN) 0.2 % ophthalmic solution 1 drop 3 times daily      cyclopentolate (CYCLOGYL) 1 % ophthalmic solution 1 drop once      OLANZapine (ZYPREXA) 5 MG tablet Take 1 tablet by mouth nightly (Patient not taking: Reported on 6/23/2020) 30 tablet 3    prochlorperazine (COMPAZINE) 5 MG tablet Take 1 tablet by mouth every 6 hours as needed for Nausea (Patient not taking: Reported on 6/23/2020) 60 tablet 1     No current facility-administered medications for this visit.        No Known Allergies   Health Maintenance   Topic Date Due    Hepatitis C screen  1945    DTaP/Tdap/Td vaccine (1 - Tdap) 12/16/1964    Shingles Vaccine (1 of 2) 12/16/1995    Colon cancer screen colonoscopy  12/16/1995    Annual Wellness Visit (AWV)  06/19/2019    Low dose CT lung screening  04/13/2021    A1C test (Diabetic or Prediabetic)  05/05/2021 tumor in the bladder but no pathologically enlarged lymph nodes. We requested PDL 1 staining on bladder biopsy from March 31, 2020, Combined Positive Score:   < 10. This score predicts poor response to immunotherapy. Had a lengthy discussion with the patient about further management. I emphasized again that the only possibility of cure is bladder surgery. The patient remains opposed to having bladder surgery. 2. Neoadjuvant chemotherapy with Gemzar and cisplatin. Started on April 28, 2020. Today the patient presents for cycle number 2-day 15 however he is pancytopenic. He denies having any active bleeding, no fevers. He did not have any nausea no vomiting, no oral mucositis. He denies having any fevers from Gemzar. He denies having hematuria. Due to pancytopenia his treatment is on hold. Neutropenic precautions discussed with the patient. He was instructed to track his temperature daily and call us with fever above 100.3. He was instructed to call us with any signs or symptoms of infection. The patient denies having any bleeding. We will check his CBC in 1 week. The patient will contact urologist to reschedule his cystoscopy after cycle #3 of Gemzar and cisplatin   3. Left eye infection. Much improved with erythromycin cream.Return in about 2 weeks (around 7/7/2020).      Orders Placed:   Orders Placed This Encounter   Procedures    CBC Auto Differential     Standing Status:   Future     Standing Expiration Date:   6/23/2021        Medications Prescribed:   Orders Placed This Encounter   Medications    erythromycin (ROMYCIN) 5 MG/GM ophthalmic ointment     Sig: apply to the left eye twice a day     Dispense:  1 g     Refill:  1

## 2020-06-24 LAB
CHLORIDE, WHOLE BLOOD: 104 MEQ/L (ref 98–109)
CREATININE, WHOLE BLOOD: 0.8 MG/DL (ref 0.5–1.2)
GLUCOSE, WHOLE BLOOD: 158 MG/DL (ref 70–108)
IONIZED CALCIUM, WHOLE BLOOD: 1.15 MMOL/L (ref 1.12–1.32)
POTASSIUM, WHOLE BLOOD: 6 MEQ/L (ref 3.5–4.9)
SODIUM, WHOLE BLOOD: 139 MEQ/L (ref 138–146)

## 2020-06-30 ENCOUNTER — HOSPITAL ENCOUNTER (OUTPATIENT)
Age: 75
Discharge: HOME | End: 2020-06-30
Payer: MEDICARE

## 2020-06-30 DIAGNOSIS — Z51.11 ENCOUNTER FOR CHEMOTHERAPY MANAGEMENT: ICD-10-CM

## 2020-06-30 DIAGNOSIS — C67.9 UROTHELIAL CARCINOMA OF BLADDER (HCC): ICD-10-CM

## 2020-06-30 LAB
ABSOLUTE IMMATURE GRANULOCYTE: 0.01 THOU/MM3 (ref 0–0.07)
BASINOPHIL, AUTOMATED: 1 % (ref 0–3)
BASOPHILS ABSOLUTE: 0 THOU/MM3 (ref 0–0.1)
EOSINOPHILS ABSOLUTE: 0.1 THOU/MM3 (ref 0–0.4)
EOSINOPHILS RELATIVE PERCENT: 4 % (ref 0–4)
HCT VFR BLD CALC: 33.7 % (ref 42–52)
HEMOGLOBIN: 11.1 GM/DL (ref 14–18)
IMMATURE GRANULOCYTES: 0 %
LYMPHOCYTES # BLD: 40 % (ref 15–47)
LYMPHOCYTES ABSOLUTE: 1.1 THOU/MM3 (ref 1–4.8)
MCH RBC QN AUTO: 32.2 PG (ref 26–33)
MCHC RBC AUTO-ENTMCNC: 32.9 GM/DL (ref 32.2–35.5)
MCV RBC AUTO: 98 FL (ref 80–94)
MONOCYTES ABSOLUTE: 0.5 THOU/MM3 (ref 0.4–1.3)
MONOCYTES: 18 % (ref 0–12)
PDW BLD-RTO: 16.4 % (ref 11.5–14.5)
PLATELET # BLD: 116 THOU/MM3 (ref 130–400)
PMV BLD AUTO: 10.1 FL (ref 9.4–12.4)
RBC # BLD: 3.45 MILL/MM3 (ref 4.7–6.1)
SEG NEUTROPHILS: 37 % (ref 43–75)
SEGMENTED NEUTROPHILS ABSOLUTE COUNT: 1 THOU/MM3 (ref 1.8–7.7)
WBC # BLD: 2.8 THOU/MM3 (ref 4.8–10.8)

## 2020-06-30 PROCEDURE — 85025 COMPLETE CBC W/AUTO DIFF WBC: CPT

## 2020-06-30 PROCEDURE — 36415 COLL VENOUS BLD VENIPUNCTURE: CPT

## 2020-07-07 ENCOUNTER — OFFICE VISIT (OUTPATIENT)
Dept: ONCOLOGY | Age: 75
End: 2020-07-07
Payer: MEDICARE

## 2020-07-07 ENCOUNTER — HOSPITAL ENCOUNTER (OUTPATIENT)
Dept: INFUSION THERAPY | Age: 75
Discharge: HOME | End: 2020-07-07
Payer: MEDICARE

## 2020-07-07 VITALS
WEIGHT: 191 LBS | SYSTOLIC BLOOD PRESSURE: 116 MMHG | OXYGEN SATURATION: 96 % | RESPIRATION RATE: 18 BRPM | HEART RATE: 82 BPM | TEMPERATURE: 98.2 F | HEIGHT: 67 IN | DIASTOLIC BLOOD PRESSURE: 57 MMHG | BODY MASS INDEX: 29.98 KG/M2

## 2020-07-07 VITALS
HEIGHT: 67 IN | TEMPERATURE: 98.7 F | HEART RATE: 83 BPM | WEIGHT: 191 LBS | BODY MASS INDEX: 29.98 KG/M2 | RESPIRATION RATE: 16 BRPM | SYSTOLIC BLOOD PRESSURE: 133 MMHG | OXYGEN SATURATION: 98 % | DIASTOLIC BLOOD PRESSURE: 66 MMHG

## 2020-07-07 DIAGNOSIS — C67.9 UROTHELIAL CARCINOMA OF BLADDER (HCC): Primary | ICD-10-CM

## 2020-07-07 DIAGNOSIS — C67.9 LOCAL RECURRENCE OF CANCER OF URINARY BLADDER (HCC): ICD-10-CM

## 2020-07-07 DIAGNOSIS — C67.8 MALIGNANT NEOPLASM OF OVERLAPPING SITES OF BLADDER (HCC): ICD-10-CM

## 2020-07-07 DIAGNOSIS — Z51.11 ENCOUNTER FOR CHEMOTHERAPY MANAGEMENT: ICD-10-CM

## 2020-07-07 LAB
ABSOLUTE IMMATURE GRANULOCYTE: 0.03 THOU/MM3 (ref 0–0.07)
ALBUMIN SERPL-MCNC: 3.8 G/DL (ref 3.5–5.1)
ALP BLD-CCNC: 116 U/L (ref 38–126)
ALT SERPL-CCNC: 11 U/L (ref 11–66)
AST SERPL-CCNC: 16 U/L (ref 5–40)
BASINOPHIL, AUTOMATED: 1 % (ref 0–3)
BASOPHILS ABSOLUTE: 0 THOU/MM3 (ref 0–0.1)
BILIRUB SERPL-MCNC: 0.5 MG/DL (ref 0.3–1.2)
BILIRUBIN DIRECT: < 0.2 MG/DL (ref 0–0.3)
BUN BLDV-MCNC: 11 MG/DL (ref 7–22)
CHLORIDE, WHOLE BLOOD: 105 MEQ/L (ref 98–109)
CO2: 28 MEQ/L (ref 23–33)
CREATININE, WHOLE BLOOD: 1 MG/DL (ref 0.5–1.2)
EOSINOPHILS ABSOLUTE: 0.1 THOU/MM3 (ref 0–0.4)
EOSINOPHILS RELATIVE PERCENT: 3 % (ref 0–4)
GFR, ESTIMATED: 78 ML/MIN/1.73M2
GLUCOSE, WHOLE BLOOD: 161 MG/DL (ref 70–108)
HCT VFR BLD CALC: 36 % (ref 42–52)
HEMOGLOBIN: 11.7 GM/DL (ref 14–18)
IMMATURE GRANULOCYTES: 1 %
IONIZED CALCIUM, WHOLE BLOOD: 1.21 MMOL/L (ref 1.12–1.32)
LYMPHOCYTES # BLD: 30 % (ref 15–47)
LYMPHOCYTES ABSOLUTE: 1.2 THOU/MM3 (ref 1–4.8)
MCH RBC QN AUTO: 32.1 PG (ref 26–33)
MCHC RBC AUTO-ENTMCNC: 32.5 GM/DL (ref 32.2–35.5)
MCV RBC AUTO: 99 FL (ref 80–94)
MONOCYTES ABSOLUTE: 0.6 THOU/MM3 (ref 0.4–1.3)
MONOCYTES: 15 % (ref 0–12)
PDW BLD-RTO: 17.4 % (ref 11.5–14.5)
PLATELET # BLD: 218 THOU/MM3 (ref 130–400)
PMV BLD AUTO: 9.1 FL (ref 9.4–12.4)
POTASSIUM, WHOLE BLOOD: 4.8 MEQ/L (ref 3.5–4.9)
RBC # BLD: 3.64 MILL/MM3 (ref 4.7–6.1)
SEG NEUTROPHILS: 50 % (ref 43–75)
SEGMENTED NEUTROPHILS ABSOLUTE COUNT: 2.1 THOU/MM3 (ref 1.8–7.7)
SODIUM, WHOLE BLOOD: 142 MEQ/L (ref 138–146)
TOTAL PROTEIN: 6.2 G/DL (ref 6.1–8)
WBC # BLD: 4.2 THOU/MM3 (ref 4.8–10.8)

## 2020-07-07 PROCEDURE — 96417 CHEMO IV INFUS EACH ADDL SEQ: CPT

## 2020-07-07 PROCEDURE — 96367 TX/PROPH/DG ADDL SEQ IV INF: CPT

## 2020-07-07 PROCEDURE — 80047 BASIC METABLC PNL IONIZED CA: CPT

## 2020-07-07 PROCEDURE — 80076 HEPATIC FUNCTION PANEL: CPT

## 2020-07-07 PROCEDURE — 96415 CHEMO IV INFUSION ADDL HR: CPT

## 2020-07-07 PROCEDURE — 96375 TX/PRO/DX INJ NEW DRUG ADDON: CPT

## 2020-07-07 PROCEDURE — 36415 COLL VENOUS BLD VENIPUNCTURE: CPT

## 2020-07-07 PROCEDURE — 84520 ASSAY OF UREA NITROGEN: CPT

## 2020-07-07 PROCEDURE — 96413 CHEMO IV INFUSION 1 HR: CPT

## 2020-07-07 PROCEDURE — G8417 CALC BMI ABV UP PARAM F/U: HCPCS | Performed by: INTERNAL MEDICINE

## 2020-07-07 PROCEDURE — 4004F PT TOBACCO SCREEN RCVD TLK: CPT | Performed by: INTERNAL MEDICINE

## 2020-07-07 PROCEDURE — G8427 DOCREV CUR MEDS BY ELIG CLIN: HCPCS | Performed by: INTERNAL MEDICINE

## 2020-07-07 PROCEDURE — 82374 ASSAY BLOOD CARBON DIOXIDE: CPT

## 2020-07-07 PROCEDURE — 1123F ACP DISCUSS/DSCN MKR DOCD: CPT | Performed by: INTERNAL MEDICINE

## 2020-07-07 PROCEDURE — 3017F COLORECTAL CA SCREEN DOC REV: CPT | Performed by: INTERNAL MEDICINE

## 2020-07-07 PROCEDURE — 85025 COMPLETE CBC W/AUTO DIFF WBC: CPT

## 2020-07-07 PROCEDURE — 99211 OFF/OP EST MAY X REQ PHY/QHP: CPT

## 2020-07-07 PROCEDURE — 99214 OFFICE O/P EST MOD 30 MIN: CPT | Performed by: INTERNAL MEDICINE

## 2020-07-07 PROCEDURE — 96366 THER/PROPH/DIAG IV INF ADDON: CPT

## 2020-07-07 PROCEDURE — 6360000002 HC RX W HCPCS: Performed by: INTERNAL MEDICINE

## 2020-07-07 PROCEDURE — 4040F PNEUMOC VAC/ADMIN/RCVD: CPT | Performed by: INTERNAL MEDICINE

## 2020-07-07 PROCEDURE — 2580000003 HC RX 258: Performed by: INTERNAL MEDICINE

## 2020-07-07 RX ORDER — SODIUM CHLORIDE 9 MG/ML
INJECTION, SOLUTION INTRAVENOUS CONTINUOUS
Status: CANCELLED | OUTPATIENT
Start: 2020-07-07

## 2020-07-07 RX ORDER — SODIUM CHLORIDE 0.9 % (FLUSH) 0.9 %
10 SYRINGE (ML) INJECTION PRN
Status: CANCELLED | OUTPATIENT
Start: 2020-07-07

## 2020-07-07 RX ORDER — DEXAMETHASONE SODIUM PHOSPHATE 4 MG/ML
8 INJECTION, SOLUTION INTRA-ARTICULAR; INTRALESIONAL; INTRAMUSCULAR; INTRAVENOUS; SOFT TISSUE ONCE
Status: CANCELLED | OUTPATIENT
Start: 2020-07-21

## 2020-07-07 RX ORDER — DIPHENHYDRAMINE HYDROCHLORIDE 50 MG/ML
50 INJECTION INTRAMUSCULAR; INTRAVENOUS ONCE
Status: CANCELLED | OUTPATIENT
Start: 2020-07-21

## 2020-07-07 RX ORDER — DIPHENHYDRAMINE HYDROCHLORIDE 50 MG/ML
50 INJECTION INTRAMUSCULAR; INTRAVENOUS ONCE
Status: CANCELLED | OUTPATIENT
Start: 2020-07-14

## 2020-07-07 RX ORDER — HEPARIN SODIUM (PORCINE) LOCK FLUSH IV SOLN 100 UNIT/ML 100 UNIT/ML
500 SOLUTION INTRAVENOUS PRN
Status: CANCELLED | OUTPATIENT
Start: 2020-07-07

## 2020-07-07 RX ORDER — SODIUM CHLORIDE 0.9 % (FLUSH) 0.9 %
10 SYRINGE (ML) INJECTION PRN
Status: CANCELLED | OUTPATIENT
Start: 2020-07-21

## 2020-07-07 RX ORDER — METHYLPREDNISOLONE SODIUM SUCCINATE 125 MG/2ML
125 INJECTION, POWDER, LYOPHILIZED, FOR SOLUTION INTRAMUSCULAR; INTRAVENOUS ONCE
Status: CANCELLED | OUTPATIENT
Start: 2020-07-07

## 2020-07-07 RX ORDER — SODIUM CHLORIDE 9 MG/ML
20 INJECTION, SOLUTION INTRAVENOUS ONCE
Status: COMPLETED | OUTPATIENT
Start: 2020-07-07 | End: 2020-07-07

## 2020-07-07 RX ORDER — SODIUM CHLORIDE 0.9 % (FLUSH) 0.9 %
5 SYRINGE (ML) INJECTION PRN
Status: CANCELLED | OUTPATIENT
Start: 2020-07-14

## 2020-07-07 RX ORDER — HEPARIN SODIUM (PORCINE) LOCK FLUSH IV SOLN 100 UNIT/ML 100 UNIT/ML
500 SOLUTION INTRAVENOUS PRN
Status: CANCELLED | OUTPATIENT
Start: 2020-07-14

## 2020-07-07 RX ORDER — HEPARIN SODIUM (PORCINE) LOCK FLUSH IV SOLN 100 UNIT/ML 100 UNIT/ML
500 SOLUTION INTRAVENOUS PRN
Status: CANCELLED | OUTPATIENT
Start: 2020-07-21

## 2020-07-07 RX ORDER — METHYLPREDNISOLONE SODIUM SUCCINATE 125 MG/2ML
125 INJECTION, POWDER, LYOPHILIZED, FOR SOLUTION INTRAMUSCULAR; INTRAVENOUS ONCE
Status: CANCELLED | OUTPATIENT
Start: 2020-07-21

## 2020-07-07 RX ORDER — PALONOSETRON 0.05 MG/ML
0.25 INJECTION, SOLUTION INTRAVENOUS ONCE
Status: COMPLETED | OUTPATIENT
Start: 2020-07-07 | End: 2020-07-07

## 2020-07-07 RX ORDER — SODIUM CHLORIDE 0.9 % (FLUSH) 0.9 %
10 SYRINGE (ML) INJECTION PRN
Status: CANCELLED | OUTPATIENT
Start: 2020-07-14

## 2020-07-07 RX ORDER — SODIUM CHLORIDE 9 MG/ML
20 INJECTION, SOLUTION INTRAVENOUS ONCE
Status: CANCELLED | OUTPATIENT
Start: 2020-07-14

## 2020-07-07 RX ORDER — SODIUM CHLORIDE 0.9 % (FLUSH) 0.9 %
5 SYRINGE (ML) INJECTION PRN
Status: CANCELLED | OUTPATIENT
Start: 2020-07-21

## 2020-07-07 RX ORDER — METHYLPREDNISOLONE SODIUM SUCCINATE 125 MG/2ML
125 INJECTION, POWDER, LYOPHILIZED, FOR SOLUTION INTRAMUSCULAR; INTRAVENOUS ONCE
Status: CANCELLED | OUTPATIENT
Start: 2020-07-14

## 2020-07-07 RX ORDER — SODIUM CHLORIDE 9 MG/ML
INJECTION, SOLUTION INTRAVENOUS CONTINUOUS
Status: CANCELLED | OUTPATIENT
Start: 2020-07-14

## 2020-07-07 RX ORDER — SODIUM CHLORIDE 9 MG/ML
20 INJECTION, SOLUTION INTRAVENOUS ONCE
Status: CANCELLED | OUTPATIENT
Start: 2020-07-21

## 2020-07-07 RX ORDER — SODIUM CHLORIDE 0.9 % (FLUSH) 0.9 %
5 SYRINGE (ML) INJECTION PRN
Status: CANCELLED | OUTPATIENT
Start: 2020-07-07

## 2020-07-07 RX ORDER — SODIUM CHLORIDE 9 MG/ML
INJECTION, SOLUTION INTRAVENOUS CONTINUOUS
Status: CANCELLED | OUTPATIENT
Start: 2020-07-21

## 2020-07-07 RX ORDER — DEXAMETHASONE SODIUM PHOSPHATE 4 MG/ML
8 INJECTION, SOLUTION INTRA-ARTICULAR; INTRALESIONAL; INTRAMUSCULAR; INTRAVENOUS; SOFT TISSUE ONCE
Status: CANCELLED | OUTPATIENT
Start: 2020-07-14

## 2020-07-07 RX ORDER — DIPHENHYDRAMINE HYDROCHLORIDE 50 MG/ML
50 INJECTION INTRAMUSCULAR; INTRAVENOUS ONCE
Status: CANCELLED | OUTPATIENT
Start: 2020-07-07

## 2020-07-07 RX ADMIN — POTASSIUM CHLORIDE: 2 INJECTION, SOLUTION, CONCENTRATE INTRAVENOUS at 11:13

## 2020-07-07 RX ADMIN — SODIUM CHLORIDE 150 MG: 9 INJECTION, SOLUTION INTRAVENOUS at 12:18

## 2020-07-07 RX ADMIN — POTASSIUM CHLORIDE: 2 INJECTION, SOLUTION, CONCENTRATE INTRAVENOUS at 17:00

## 2020-07-07 RX ADMIN — PALONOSETRON 0.25 MG: 0.25 INJECTION, SOLUTION INTRAVENOUS at 10:46

## 2020-07-07 RX ADMIN — MANNITOL: 250 INJECTION, SOLUTION INTRAVENOUS at 13:51

## 2020-07-07 RX ADMIN — SODIUM CHLORIDE 20 ML/HR: 9 INJECTION, SOLUTION INTRAVENOUS at 10:32

## 2020-07-07 RX ADMIN — GEMCITABINE 1500 MG: 38 INJECTION, SOLUTION INTRAVENOUS at 13:00

## 2020-07-07 RX ADMIN — DEXAMETHASONE SODIUM PHOSPHATE 12 MG: 4 INJECTION, SOLUTION INTRA-ARTICULAR; INTRALESIONAL; INTRAMUSCULAR; INTRAVENOUS; SOFT TISSUE at 10:50

## 2020-07-07 ASSESSMENT — ENCOUNTER SYMPTOMS
BACK PAIN: 0
ABDOMINAL DISTENTION: 0
RECTAL PAIN: 0
VOMITING: 0
WHEEZING: 0
SORE THROAT: 0
ABDOMINAL PAIN: 0
FACIAL SWELLING: 0
BLOOD IN STOOL: 0
COUGH: 0
CHEST TIGHTNESS: 0
COLOR CHANGE: 0
DIARRHEA: 0
TROUBLE SWALLOWING: 0
CONSTIPATION: 0
SHORTNESS OF BREATH: 0
NAUSEA: 0
EYE DISCHARGE: 0

## 2020-07-07 NOTE — PROGRESS NOTES
Patient assessed for the following post chemotherapy:    Dizziness   No  Lightheadedness  No      Acute nausea/vomiting No  Headache   No  Chest pain/pressure  No  Rash/itching   No  Shortness of breath  No    Patient kept for 20 minutes observation post infusion chemotherapy. Patient tolerated chemotherapy treatment gemzar and cisplatin without any complications. Last vital signs:   BP (!) 116/57   Pulse 82   Temp 98.2 °F (36.8 °C) (Oral)   Resp 18   Ht 5' 7\" (1.702 m)   Wt 191 lb (86.6 kg)   SpO2 96%   BMI 29.91 kg/m²       Patient instructed if experience any of the above symptoms following today's infusion, he is to notify MD immediately or go to the emergency department. Discharge instructions given to patient. Verbalizes understanding. Ambulated off unit per self with belongings.

## 2020-07-07 NOTE — PROGRESS NOTES
[facility] [address] [facility] [address], [address] [facility] [address]  [address] [address]  Dept: [phone]  Dept Fax: [phone]: [phone]    Visit Date:7/7/2020     Atul Francois is a 76 y.o. male who presents today for:   Chief Complaint   Patient presents with    Follow-up     bladder CA        HPI:   This is a 80-year-old man with muscle invasive bladder cancer. He had cystoscopy in August 2019 patient developed gross hematuria and flank pain end of December 2018. He had CT urogram on December 13, 2018 that showed irregular thickening of the right posterior lateral bladder wall. Findings were highly suspicious for the presence of malignancy. On January 28, 2019 the patient underwent cystoscopy by Dr. Lelia Sam. It showed bladder trabeculations and bladder stone, on the right side there was a diverticulum with a bladder stone and bladder tumor inside. On January 30, 2019 the patient underwent TURBT. Final pathology report showed invasive high-grade urothelial carcinoma, clear cell variant. Deep smooth muscle was involved by carcinoma. Dr. Lelia Sam recommended neoadjuvant chemotherapy before bladder resection. However, the patient was absolutely opposed to having a bladder surgery. He agreed to concurrent chemoradiation with understanding that outcome is inferior to neoadjuvant chemo and surgery. He started concurrent chemoradiation on March 18, 2019 and completed on May 15, 2019. Overall he tolerated treatment with cisplatin well. He had MRI of the pelvis done approximately 4 weeks from completion of concurrent chemoradiation, it showed nice response to treatment. He had cystoscopy in August 2019 that did not show residual tumor. On March 31, 2020 due to recurrent hematuria, the patient had cystoscopy which showed tumor along the rim of the bladder diverticulum which was located on the right side.  The largest tumor was 6 cm in size.  Biopsy showed invasive high-grade urothelial carcinoma, clear-cell variant.  Deep smooth muscle was present and involved by carcinoma. He received recommendation to proceed with bladder surgery. The patient is refusing surgery at this point of time, he would like to see what to neoadjuvant chemotherapy will due to his bladder cancer   He started neoadjuvant chemotherapy with Gemzar and cisplatin on April 28, 2020. Interval history on 07/07/2020: The patient presents to the medical oncology clinic for cycle number 3-day 1 of chemotherapy with Gemzar and cisplatin. He reports that he tolerates treatment well. He denies having any nausea no vomiting. He did not have drug-induced fever after Gemzar. He denies having any peripheral neuropathy. His hematuria stopped after TURP. He denies having any flank pain.   He denies having any new complaints since last visit with me      HPI   Past Medical History:   Diagnosis Date    Anxiety     Cancer ([address]     bladder chemo    Cataract     LEFT EYE    Diabetes mellitus ([address]     Glaucoma     LEFT EYE    History of hematuria     Hyperlipidemia    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     SINCE CHILDHOOD    Thyroid disease     Type 2 diabetes mellitus without complication ([address]       Past Surgical History:   Procedure Laterality Date    CYSTOSCOPY N/A 1/30/2019    TRANSURETHRAL RESECTION BLADDER TUMOR, CYSTOLITHOLAPAXY performed by Jacy Darling MD at [address] 3/31/2020    CYSTOSCOPY WITH TRANSURETHRAL RESECTION OF BLADDER TUMOR REMOVAL OF BLADDER STONE performed by Chele Meza MD at [facility] as kid    TONSILLECTOMY        Family History   Problem Relation Age of Onset    Other Mother         BRAIN TUMOR    Stroke Mother     Heart Disease Father     High Blood Pressure Father     Cancer Sister     Diabetes Neg Hx       Social History     Tobacco Use    Smoking status: Current Every Day Smoker     Packs/day: 1.00     Years: 53.00     Pack years: 53.00     Types: Cigarettes    Smokeless tobacco: Never Used    Tobacco comment: depending on tumor analysis   Substance Use Topics    Alcohol use: No     Comment: not in 40 years      Current Outpatient Medications   Medication Sig Dispense Refill    erythromycin (ROMYCIN) 5 MG/GM ophthalmic ointment apply to the left eye twice a day 1 g 1    prochlorperazine (COMPAZINE) 5 MG tablet Take 1 tablet by mouth every 6 hours as needed for Nausea 60 tablet 1    CHANTIX CONTINUING MONTH SAV 1 MG tablet daily      linagliptin (TRADJENTA) 5 MG tablet Take 5 mg by mouth daily      Multiple Vitamins-Minerals ([employer] SPECTRAVITE ADULT 50+ PO) Take by mouth      Saw [place] 450 MG CAPS Take by mouth daily Indications: 3-4 times per week PRN       atorvastatin (LIPITOR) 40 MG tablet Take 40 mg by mouth daily      metFORMIN (GLUCOPHAGE) 500 MG tablet Take 500 mg by mouth 4 times daily       levothyroxine (SYNTHROID) 125 MCG tablet Take 125 mcg by mouth Daily      prednisoLONE acetate (PRED FORTE) 1 % ophthalmic suspension 1 drop 4 times daily      timolol (BETIMOL) 0.5 % ophthalmic solution 1 drop 2 times daily      brimonidine (ALPHAGAN) 0.2 % ophthalmic solution 1 drop 3 times daily      cyclopentolate (CYCLOGYL) 1 % ophthalmic solution 1 drop once      OLANZapine (ZYPREXA) 5 MG tablet Take 1 tablet by mouth nightly (Patient not taking: Reported on 7/7/2020) 30 tablet 3     No current facility-administered medications for this visit.        No Known Allergies   Health Maintenance   Topic Date Due    Hepatitis C screen  1945    DTaP/Tdap/Td vaccine (1 - Tdap) 12/16/1964    Shingles Vaccine (1 of 2) 12/16/1995    Colon cancer screen colonoscopy  12/16/1995    Annual Wellness Visit (AWV)  06/19/2019    Flu vaccine (1) 09/01/2020    Low dose CT lung screening  04/13/2021    A1C test (Diabetic or Prediabetic)  05/05/2021    Lipid screen  05/05/2021    Potassium monitoring  06/23/2021    Creatinine monitoring  06/23/2021    Pneumococcal 65+ years Vaccine  Completed    AAA screen  Completed    Hepatitis A vaccine  Aged Out    Hepatitis B vaccine  Aged Out    Hib vaccine  Aged Out    Meningococcal (ACWY) vaccine  Aged Out        Subjective:   Review of Systems   Constitutional: Negative for activity change, appetite change, fatigue and fever. HENT: Negative for congestion, dental problem, facial swelling, hearing loss, mouth sores, nosebleeds, sore throat, tinnitus and trouble swallowing. Eyes: Negative for discharge and visual disturbance. Respiratory: Negative for cough, chest tightness, shortness of breath and wheezing. Cardiovascular: Negative for chest pain, palpitations and leg swelling. Gastrointestinal: Negative for abdominal distention, abdominal pain, blood in stool, constipation, diarrhea, nausea, rectal pain and vomiting. Endocrine: Negative for cold intolerance, polydipsia and polyuria. Genitourinary: Negative for decreased urine volume, difficulty urinating, dysuria, flank pain, hematuria and urgency. Musculoskeletal: Negative for arthralgias, back pain, gait problem, joint swelling, myalgias and neck stiffness. Skin: Negative for color change, rash and wound. Neurological: Negative for dizziness, tremors, seizures, speech difficulty, weakness, light-headedness, numbness and headaches. Hematological: Negative for adenopathy. Does not bruise/bleed easily. Psychiatric/Behavioral: Negative for confusion and sleep disturbance. The patient is not nervous/anxious. Objective:   Physical Exam  Vitals signs reviewed. Constitutional:       General: He is not in acute distress. Appearance: He is well-developed. HENT:      Head: Normocephalic. Mouth/Throat:      Pharynx: No oropharyngeal exudate. Eyes:      General: No scleral icterus. Right eye: No discharge. Left eye: No discharge.       Pupils: Pupils are equal, round, and reactive to light. Neck:      Musculoskeletal: Normal range of motion and neck supple. Thyroid: No thyromegaly. Vascular: No JVD. Trachea: No tracheal deviation. Cardiovascular:      Rate and Rhythm: Normal rate. Heart sounds: Normal heart sounds. No murmur. No friction rub. No gallop. Pulmonary:      Effort: Pulmonary effort is normal. No respiratory distress. Breath sounds: Normal breath sounds. No stridor. No wheezing or rales. Chest:      Chest wall: No tenderness. Abdominal:      General: Bowel sounds are normal. There is no distension. Palpations: Abdomen is soft. There is no mass. Tenderness: There is no abdominal tenderness. There is no rebound. Musculoskeletal: Normal range of motion. Comments: Good range of motion in all four extremities. Lymphadenopathy:      Cervical: No cervical adenopathy. Skin:     General: Skin is warm. Findings: No erythema or rash. Neurological:      Mental Status: He is alert and oriented to person, place, and time. Cranial Nerves: No cranial nerve deficit. Motor: No abnormal muscle tone. Deep Tendon Reflexes: Reflexes are normal and symmetric. Psychiatric:         Behavior: Behavior normal.         Thought Content: Thought content normal.         Judgment: Judgment normal.        /66 (Site: Left Upper Arm, Position: Sitting, Cuff Size: Medium Adult)   Pulse 83   Temp 98.7 °F (37.1 °C) (Oral)   Resp 16   Ht 5' 7\" (1.702 m)   Wt 191 lb (86.6 kg)   SpO2 98%   BMI 29.91 kg/m²      ECOG status is 0.     Imaging studies and labs:           Lab Results   Component Value Date    WBC 4.2 (L) 07/07/2020    HGB 11.7 (L) 07/07/2020    HCT 36.0 (L) 07/07/2020    MCV 99 (H) 07/07/2020     07/07/2020       Chemistry        Component Value Date/Time     07/07/2020 0858     06/23/2020 0850    K 4.8 07/07/2020 0858    K 4.3 06/23/2020 0850     06/23/2020 0850 CO2 28 07/07/2020 0859    BUN 11 07/07/2020 0859    CREATININE 1.0 07/07/2020 0858    CREATININE 0.8 06/23/2020 0850        Component Value Date/Time    CALCIUM 9.4 06/23/2020 0850    ALKPHOS 116 07/07/2020 0859    AST 16 07/07/2020 0859    ALT 11 07/07/2020 0859    BILITOT 0.5 07/07/2020 0859          Bladder biopsy on March 31, 2020:    Bladder lesion, TURBT:   Invasive high-grade urothelial carcinoma, clear-cell variant.   Deep smooth muscle is present and involved by carcinoma.   Areas of urothelial carcinoma in situ are also present. - PD-L1 22C3 pharmDx by IHC [pembrolizumab (Keytruda)] (performed at  [facility])              Result:  No Expression              Combined Positive Score:   < 10    CT of the chest on April 13, 2020 showed:  1. Interval development of borderline enlarged right hilar lymph node. 2. 0.6 cm subsolid nodule right middle lobe metastasis is not excludable differential includes focal atelectasis     MRI of the pelvis on April 13, 2020 showed:  Bladder: 2.8 cm x 4.7 cm bladder diverticulum toward the right. There is asymmetric wall thickening of the bladder toward the right. Measures up to 15 mm. It enhances and is suspicious for the known bladder cancer.       Bowel: Moderate sigmoid and descending colon diverticulosis. Appendix appears normal       Free fluid: None.       Lymphadenopathy:   1. There are no pathologically enlarged lymph nodes.       Musculoskeletal: Unremarkable.       Reproductive: 7 mm x 10 mm probable prostate utricle cyst.             Assessment/Plan:        Diagnosis Orders   1. Urothelial carcinoma of bladder ([address]     2. Encounter for chemotherapy management     3. Local recurrence of cancer of urinary bladder ([address]     4. Chemotherapy-induced neutropenia (HCC)     5.  Chemotherapy-induced thrombocytopenia            1. 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   The preferred management of patients with muscle-invasive bladder cancer consists of a multimodal approach comprising neoadjuvant chemotherapy followed by radical cystectomy. This approach is supported by  evidence that neoadjuvant cisplatin-based chemotherapy improves survival compared with locoregional treatment alone. The patient refused having bladder surgery. He understood that the only potentially curative option is surgery. For patients who are  medically inoperable due to comorbidity and frailty, chemoradiation may be a reasonable alternative. Some elderly patients with invasive bladder cancer can be cured by cisplatin-based regimen and radiation therapy or at least effectively palliated for sustained periods measured in months to years. The patient understood that this was not the preferred and recommended approach, but he decided to proceed with this therapy. He started concurrent chemoradiation on March 18, 2019 and completed on May 15, 2019. Overall he tolerated treatment with cisplatin well. He had MRI of the pelvis done approximately 4 weeks from completion of concurrent chemoradiation that showed nice response to treatment. Surveillance cystoscopy in October 2019 showed no residual macroscopic tumor. However, In March 2020 the patient developed recurrent hematuria. He underwent cystoscopy with TURP, it showed tumor along the rim of the bladder diverticulum which was located on the right side.  The largest tumor was 6cm in size. Biopsy showediInvasive high-grade urothelial carcinoma, clear-cell variant.  Deep smooth muscle was present and involved by carcinoma. Patient continues to be very hesitant to proceed with bladder resection. He will prefer to receive some form of systemic chemotherapy, hoping that it will give him additional time before the need for the surgery. The patient had CT of the chest that showed small indeterminate pulmonary nodule. MRI confirmed the presence of the tumor in the bladder but no pathologically enlarged lymph nodes.  We requested PDL 1 staining on bladder biopsy from March 31, 2020, Combined Positive Score:   < 10. This score predicts poor response to immunotherapy. Had a lengthy discussion with the patient about further management. I emphasized again that the only possibility of cure is bladder surgery. The patient remains opposed to having bladder surgery. 2. Neoadjuvant chemotherapy with Gemzar and cisplatin. Started on April 28, 2020. Today the patient presents for cycle number 3-day 1. With cycle #2 he was not able to receive day 15 of Gemzar cisplatin. Today his WBC is up to 4.2, ANC is 2.1. Hemoglobin has improved to 11.7 and platelet count is 237,040. The patient understand that very likely he will be not able to receive day 15 of cycle #3. He is scheduled to have cystoscopy end of August.  Short of pancytopenia the patient tolerates treatment well. No Gemzar induced fever. She denies having any peripheral neuropathy, no hearing problems. His hematuria stopped after TURP procedure  3. Left eye infection. Much improved with erythromycin cream.Return in about 2 weeks (around 7/21/2020). Orders Placed:   No orders of the defined types were placed in this encounter. Medications Prescribed:   No orders of the defined types were placed in this encounter.

## 2020-07-07 NOTE — PATIENT INSTRUCTIONS
1. Proceed with cycle 3,day 1 of Gemzar cisplatin today. Dose of Gemzar is reduced to 750 mg/m² . The patient will return to chemotherapy suite only in 1 week for cycle 3, day 8 of Gemzar cisplatin  2.   RTC to see me for labs: CBC, BMP and possibly cycle 3-day 15 of Gemzar in 2 weeks

## 2020-07-07 NOTE — PLAN OF CARE
Problem: Intellectual/Education/Knowledge Deficit  Goal: Teaching initiated upon admission  Outcome: Met This Shift  Note: Patient verbalizes understanding to verbal information given on gemzar and cisplatin, including action and possible side effects. Aware to call MD if develop complications. Intervention: Verbal/written education provided  Note:   Chemotherapy Teaching     What is Chemotherapy   Drug action [x]   Method of Administration [x]   Handouts given []     Side Effects  Nausea/vomiting [x]   Diarrhea [x]   Fatigue [x]   Signs / Symptoms of infection [x]   Neutropenia [x]   Thrombocytopenia [x]   Alopecia [x]   neuropathy [x]   [place] diet &  the importance of fluids [x]       Micellaneous  Importance of nutrition [x]   Importance of oral hygiene [x]   When to call the MD [x]   Monitoring labs [x]   Use of supportive services []     Explanation of Drug Regimen / Frequency  Gemzar and cisplatin     Comments  Verbalized understanding to drug,action,side effects and when to call MD         Problem: Discharge Planning  Goal: Knowledge of discharge instructions  Description: Knowledge of discharge instructions     Outcome: Met This Shift  Note: Patient verbalizes understanding of discharge instructions, follow up appointment and when to call physician if needed   Intervention: Discharge to appropriate level of care  Note: Discharge instructions given to pt and reviewed. Follow up appointments discussed. Problem: Falls - Risk of:  Goal: Will remain free from falls  Description: Will remain free from falls  Outcome: Met This Shift  Note: Patient free of falls this visit. Intervention: Assess risk factors for falls  Note: Fall risks assessed. Precautions discussed. Call light within reach during visit. Care plan reviewed with patient. Patient verbalizes understanding of the plan of care and contributes to goal setting.

## 2020-07-14 ENCOUNTER — HOSPITAL ENCOUNTER (OUTPATIENT)
Dept: INFUSION THERAPY | Age: 75
Discharge: HOME | End: 2020-07-14
Payer: MEDICARE

## 2020-07-14 VITALS
HEIGHT: 67 IN | RESPIRATION RATE: 18 BRPM | HEART RATE: 64 BPM | BODY MASS INDEX: 29.51 KG/M2 | DIASTOLIC BLOOD PRESSURE: 60 MMHG | OXYGEN SATURATION: 97 % | WEIGHT: 188 LBS | SYSTOLIC BLOOD PRESSURE: 125 MMHG | TEMPERATURE: 98.2 F

## 2020-07-14 DIAGNOSIS — C67.9 UROTHELIAL CARCINOMA OF BLADDER (HCC): Primary | ICD-10-CM

## 2020-07-14 DIAGNOSIS — C67.8 MALIGNANT NEOPLASM OF OVERLAPPING SITES OF BLADDER (HCC): ICD-10-CM

## 2020-07-14 DIAGNOSIS — Z51.11 ENCOUNTER FOR CHEMOTHERAPY MANAGEMENT: ICD-10-CM

## 2020-07-14 DIAGNOSIS — C67.9 LOCAL RECURRENCE OF CANCER OF URINARY BLADDER (HCC): ICD-10-CM

## 2020-07-14 LAB
ABSOLUTE IMMATURE GRANULOCYTE: 0.01 THOU/MM3 (ref 0–0.07)
ALBUMIN SERPL-MCNC: 3.7 G/DL (ref 3.5–5.1)
ALP BLD-CCNC: 100 U/L (ref 38–126)
ALT SERPL-CCNC: 12 U/L (ref 11–66)
AST SERPL-CCNC: 16 U/L (ref 5–40)
BASINOPHIL, AUTOMATED: 1 % (ref 0–3)
BASOPHILS ABSOLUTE: 0 THOU/MM3 (ref 0–0.1)
BILIRUB SERPL-MCNC: 0.5 MG/DL (ref 0.3–1.2)
BILIRUBIN DIRECT: < 0.2 MG/DL (ref 0–0.3)
BUN BLDV-MCNC: 16 MG/DL (ref 7–22)
CHLORIDE, WHOLE BLOOD: 103 MEQ/L (ref 98–109)
CO2: 28 MEQ/L (ref 23–33)
CREATININE, WHOLE BLOOD: 0.9 MG/DL (ref 0.5–1.2)
EOSINOPHILS ABSOLUTE: 0 THOU/MM3 (ref 0–0.4)
EOSINOPHILS RELATIVE PERCENT: 1 % (ref 0–4)
GFR, ESTIMATED: 88 ML/MIN/1.73M2
GLUCOSE, WHOLE BLOOD: 150 MG/DL (ref 70–108)
HCT VFR BLD CALC: 31.8 % (ref 42–52)
HEMOGLOBIN: 10.4 GM/DL (ref 14–18)
IMMATURE GRANULOCYTES: 0 %
IONIZED CALCIUM, WHOLE BLOOD: 1.25 MMOL/L (ref 1.12–1.32)
LYMPHOCYTES # BLD: 36 % (ref 15–47)
LYMPHOCYTES ABSOLUTE: 0.9 THOU/MM3 (ref 1–4.8)
MCH RBC QN AUTO: 32.2 PG (ref 26–33)
MCHC RBC AUTO-ENTMCNC: 32.7 GM/DL (ref 32.2–35.5)
MCV RBC AUTO: 99 FL (ref 80–94)
MONOCYTES ABSOLUTE: 0.1 THOU/MM3 (ref 0.4–1.3)
MONOCYTES: 4 % (ref 0–12)
PDW BLD-RTO: 15.9 % (ref 11.5–14.5)
PLATELET # BLD: 112 THOU/MM3 (ref 130–400)
PMV BLD AUTO: 9.3 FL (ref 9.4–12.4)
POTASSIUM, WHOLE BLOOD: 4.2 MEQ/L (ref 3.5–4.9)
RBC # BLD: 3.23 MILL/MM3 (ref 4.7–6.1)
SEG NEUTROPHILS: 58 % (ref 43–75)
SEGMENTED NEUTROPHILS ABSOLUTE COUNT: 1.5 THOU/MM3 (ref 1.8–7.7)
SODIUM, WHOLE BLOOD: 141 MEQ/L (ref 138–146)
TOTAL PROTEIN: 6.3 G/DL (ref 6.1–8)
WBC # BLD: 2.5 THOU/MM3 (ref 4.8–10.8)

## 2020-07-14 PROCEDURE — 6360000002 HC RX W HCPCS: Performed by: INTERNAL MEDICINE

## 2020-07-14 PROCEDURE — 80047 BASIC METABLC PNL IONIZED CA: CPT

## 2020-07-14 PROCEDURE — 82374 ASSAY BLOOD CARBON DIOXIDE: CPT

## 2020-07-14 PROCEDURE — 85025 COMPLETE CBC W/AUTO DIFF WBC: CPT

## 2020-07-14 PROCEDURE — 96413 CHEMO IV INFUSION 1 HR: CPT

## 2020-07-14 PROCEDURE — 96375 TX/PRO/DX INJ NEW DRUG ADDON: CPT

## 2020-07-14 PROCEDURE — 84520 ASSAY OF UREA NITROGEN: CPT

## 2020-07-14 PROCEDURE — 80076 HEPATIC FUNCTION PANEL: CPT

## 2020-07-14 PROCEDURE — 2580000003 HC RX 258: Performed by: INTERNAL MEDICINE

## 2020-07-14 PROCEDURE — 36415 COLL VENOUS BLD VENIPUNCTURE: CPT

## 2020-07-14 RX ORDER — DEXAMETHASONE SODIUM PHOSPHATE 4 MG/ML
8 INJECTION, SOLUTION INTRA-ARTICULAR; INTRALESIONAL; INTRAMUSCULAR; INTRAVENOUS; SOFT TISSUE ONCE
Status: COMPLETED | OUTPATIENT
Start: 2020-07-14 | End: 2020-07-14

## 2020-07-14 RX ORDER — SODIUM CHLORIDE 9 MG/ML
20 INJECTION, SOLUTION INTRAVENOUS ONCE
Status: COMPLETED | OUTPATIENT
Start: 2020-07-14 | End: 2020-07-14

## 2020-07-14 RX ADMIN — GEMCITABINE 1500 MG: 38 INJECTION, SOLUTION INTRAVENOUS at 09:06

## 2020-07-14 RX ADMIN — DEXAMETHASONE SODIUM PHOSPHATE 8 MG: 4 INJECTION, SOLUTION INTRA-ARTICULAR; INTRALESIONAL; INTRAMUSCULAR; INTRAVENOUS; SOFT TISSUE at 08:44

## 2020-07-14 RX ADMIN — SODIUM CHLORIDE 20 ML/HR: 9 INJECTION, SOLUTION INTRAVENOUS at 08:40

## 2020-07-14 NOTE — ONCOLOGY
Chemotherapy Administration    Pre-assessment Data: Antineoplastic Agents  Other:   See toxicity flow sheet for assessment [x]     Physician Notification of Concerns Related to Chemotherapy Administration:   Physician Notified Fei Boo / Time of Notification      Interventions:   Lab work assessed  [x]   Height / Weight verified for dose [x]   Current MAR reviewed [x]   Emergency drugs available as appropriate [x]   Anaphylaxis assessment completed [x]   Pre-medications administered as ordered [x]   Blood return noted upon initiation of chemotherapy [x]   Blood return noted each 1-2ml of a vesicant medication if given IV push []   Blood return noted each 2-3ml of a non-vesicant medication if given IV push []   Monitor for signs / symptoms of hypersensitivity reaction [x]   Chemotherapy orders (drug/dose/rate) verified by 2 Chemo certified RNs [x]   Monitor IV site and blood return throughout the infusion of the medication [x]   Document IV site checks on the IV assessment form [x]   Document chemotherapy teaching on the Patient Education tab [x]   Document patient verbalizes understanding of medications being administered [x]   If IV infiltration, see ONS Guidelines []   Other:     Gemzar []

## 2020-07-14 NOTE — PLAN OF CARE
Problem: Intellectual/Education/Knowledge Deficit  Goal: Teaching initiated upon admission  Outcome: Met This Shift  Note: Patient verbalizes understanding to verbal information given on Gemzar ,action and possible side effects. Aware to call MD if develop complications. Intervention: Verbal/written education provided  Note: Chemotherapy Teaching     What is Chemotherapy   Drug action [x]   Method of Administration [x]   Handouts given []     Side Effects  Nausea/vomiting [x]   Diarrhea [x]   Fatigue [x]   Signs / Symptoms of infection [x]   Neutropenia [x]   Thrombocytopenia [x]   Alopecia [x]   neuropathy [x]   [place] diet &  the importance of fluids [x]       Micellaneous  Importance of nutrition [x]   Importance of oral hygiene [x]   When to call the MD [x]   Monitoring labs [x]   Use of supportive services []     Explanation of Drug Regimen / Frequency  Gemzar:  [id number]     Comments  Verbalized understanding to drug,action,side effects and when to call MD         Problem: Discharge Planning  Goal: Knowledge of discharge instructions  Description: Knowledge of discharge instructions     Outcome: Met This Shift  Note: Verbalized understanding of discharge instructions, follow-up appointments, and when to call the physician. Intervention: Discharge to appropriate level of care  Note: Discuss understanding of discharge instructions,follow-up appointments, and when to call the physician. Problem: Falls - Risk of:  Goal: Will remain free from falls  Description: Will remain free from falls  Outcome: Met This Shift  Note: Patient free from falls while in [facility]. Intervention: Assess risk factors for falls  Description: Assess risk factors for falls  Note: Patient assessed for fall risk on admission to [address] [place] [address]. Fall band placed on patient. Discussed the need to use the call light for assistance prior to getting up out of chair/bed. Care plan reviewed with patient.   Patient verbalize understanding of the plan of care and contribute to goal setting.

## 2020-07-14 NOTE — PROGRESS NOTES
Patient assessed for the following post chemotherapy:    Dizziness   No  Lightheadedness  No      Acute nausea/vomiting No  Headache   No  Chest pain/pressure  No  Rash/itching   No  Shortness of breath  No    Patient kept for 20 minutes observation post infusion chemotherapy. Patient tolerated chemotherapy treatment Gemzar without any complications. Last vital signs:   /61   Pulse 71   Temp 98.6 °F (37 °C) (Oral)   Resp 18   Ht 5' 7\" (1.702 m)   Wt 188 lb (85.3 kg)   SpO2 97%   BMI 29.44 kg/m²     Patient instructed if experience any of the above symptoms following today's infusion, he is to notify MD immediately or go to the emergency department. Discharge instructions given to patient. Verbalizes understanding. Ambulated off unit per self, with belongings.

## 2020-07-20 ASSESSMENT — ENCOUNTER SYMPTOMS
BLOOD IN STOOL: 0
DIARRHEA: 0
WHEEZING: 0
VOMITING: 0
SORE THROAT: 0
NAUSEA: 0
TROUBLE SWALLOWING: 0
CHEST TIGHTNESS: 0
RECTAL PAIN: 0
FACIAL SWELLING: 0
ABDOMINAL DISTENTION: 0
COUGH: 0
CONSTIPATION: 0
BACK PAIN: 0
COLOR CHANGE: 0
SHORTNESS OF BREATH: 0
ABDOMINAL PAIN: 0
EYE DISCHARGE: 0

## 2020-07-21 ENCOUNTER — HOSPITAL ENCOUNTER (OUTPATIENT)
Dept: INFUSION THERAPY | Age: 75
Discharge: HOME | End: 2020-07-21
Payer: MEDICARE

## 2020-07-21 ENCOUNTER — OFFICE VISIT (OUTPATIENT)
Dept: ONCOLOGY | Age: 75
End: 2020-07-21
Payer: MEDICARE

## 2020-07-21 VITALS
SYSTOLIC BLOOD PRESSURE: 145 MMHG | HEART RATE: 87 BPM | WEIGHT: 188.2 LBS | OXYGEN SATURATION: 100 % | BODY MASS INDEX: 29.54 KG/M2 | DIASTOLIC BLOOD PRESSURE: 65 MMHG | RESPIRATION RATE: 18 BRPM | TEMPERATURE: 97.5 F | HEIGHT: 67 IN

## 2020-07-21 DIAGNOSIS — Z51.11 ENCOUNTER FOR CHEMOTHERAPY MANAGEMENT: ICD-10-CM

## 2020-07-21 DIAGNOSIS — C67.9 LOCAL RECURRENCE OF CANCER OF URINARY BLADDER (HCC): ICD-10-CM

## 2020-07-21 DIAGNOSIS — C67.9 UROTHELIAL CARCINOMA OF BLADDER (HCC): ICD-10-CM

## 2020-07-21 LAB
ALBUMIN SERPL-MCNC: 4 G/DL (ref 3.5–5.1)
ALP BLD-CCNC: 123 U/L (ref 38–126)
ALT SERPL-CCNC: 13 U/L (ref 11–66)
AST SERPL-CCNC: 17 U/L (ref 5–40)
BASOPHILIA: ABNORMAL
BASOPHILS # BLD: 0 %
BASOPHILS ABSOLUTE: 0 THOU/MM3 (ref 0–0.1)
BILIRUB SERPL-MCNC: 0.5 MG/DL (ref 0.3–1.2)
BILIRUBIN DIRECT: < 0.2 MG/DL (ref 0–0.3)
BUN BLDV-MCNC: 16 MG/DL (ref 7–22)
CHLORIDE, WHOLE BLOOD: 103 MEQ/L (ref 98–109)
CO2: 25 MEQ/L (ref 23–33)
CREATININE, WHOLE BLOOD: 0.9 MG/DL (ref 0.5–1.2)
EOSINOPHIL # BLD: 0.5 %
EOSINOPHILS ABSOLUTE: 0 THOU/MM3 (ref 0–0.4)
ERYTHROCYTE [DISTWIDTH] IN BLOOD BY AUTOMATED COUNT: 14.7 % (ref 11.5–14.5)
ERYTHROCYTE [DISTWIDTH] IN BLOOD BY AUTOMATED COUNT: 52.7 FL (ref 35–45)
GFR, ESTIMATED: 88 ML/MIN/1.73M2
GLUCOSE, WHOLE BLOOD: 155 MG/DL (ref 70–108)
HCT VFR BLD CALC: 29.5 % (ref 42–52)
HEMOGLOBIN: 9.9 GM/DL (ref 14–18)
IMMATURE GRANS (ABS): 0.01 THOU/MM3 (ref 0–0.07)
IMMATURE GRANULOCYTES: 0.5 %
IONIZED CALCIUM, WHOLE BLOOD: 1.26 MMOL/L (ref 1.12–1.32)
LYMPHOCYTES # BLD: 61.2 %
LYMPHOCYTES ABSOLUTE: 1.1 THOU/MM3 (ref 1–4.8)
MCH RBC QN AUTO: 32.8 PG (ref 26–33)
MCHC RBC AUTO-ENTMCNC: 33.6 GM/DL (ref 32.2–35.5)
MCV RBC AUTO: 97.7 FL (ref 80–94)
MONOCYTES # BLD: 2.7 %
MONOCYTES ABSOLUTE: 0 THOU/MM3 (ref 0.4–1.3)
NUCLEATED RED BLOOD CELLS: 0 /100 WBC
PLATELET # BLD: 20 THOU/MM3 (ref 130–400)
PLATELET ESTIMATE: ABNORMAL
PMV BLD AUTO: 12.5 FL (ref 9.4–12.4)
POTASSIUM, WHOLE BLOOD: 5.3 MEQ/L (ref 3.5–4.9)
RBC # BLD: 3.02 MILL/MM3 (ref 4.7–6.1)
SCAN OF BLOOD SMEAR: NORMAL
SEG NEUTROPHILS: 35.1 %
SEGMENTED NEUTROPHILS ABSOLUTE COUNT: 0.6 THOU/MM3 (ref 1.8–7.7)
SODIUM, WHOLE BLOOD: 138 MEQ/L (ref 138–146)
TOTAL PROTEIN: 6.4 G/DL (ref 6.1–8)
WBC # BLD: 1.8 THOU/MM3 (ref 4.8–10.8)

## 2020-07-21 PROCEDURE — 82374 ASSAY BLOOD CARBON DIOXIDE: CPT

## 2020-07-21 PROCEDURE — 84520 ASSAY OF UREA NITROGEN: CPT

## 2020-07-21 PROCEDURE — G8417 CALC BMI ABV UP PARAM F/U: HCPCS | Performed by: INTERNAL MEDICINE

## 2020-07-21 PROCEDURE — 80076 HEPATIC FUNCTION PANEL: CPT

## 2020-07-21 PROCEDURE — 4004F PT TOBACCO SCREEN RCVD TLK: CPT | Performed by: INTERNAL MEDICINE

## 2020-07-21 PROCEDURE — 36415 COLL VENOUS BLD VENIPUNCTURE: CPT

## 2020-07-21 PROCEDURE — 99211 OFF/OP EST MAY X REQ PHY/QHP: CPT

## 2020-07-21 PROCEDURE — 4040F PNEUMOC VAC/ADMIN/RCVD: CPT | Performed by: INTERNAL MEDICINE

## 2020-07-21 PROCEDURE — 99213 OFFICE O/P EST LOW 20 MIN: CPT | Performed by: INTERNAL MEDICINE

## 2020-07-21 PROCEDURE — 3017F COLORECTAL CA SCREEN DOC REV: CPT | Performed by: INTERNAL MEDICINE

## 2020-07-21 PROCEDURE — 80047 BASIC METABLC PNL IONIZED CA: CPT

## 2020-07-21 PROCEDURE — G8427 DOCREV CUR MEDS BY ELIG CLIN: HCPCS | Performed by: INTERNAL MEDICINE

## 2020-07-21 PROCEDURE — 1123F ACP DISCUSS/DSCN MKR DOCD: CPT | Performed by: INTERNAL MEDICINE

## 2020-07-21 PROCEDURE — 85025 COMPLETE CBC W/AUTO DIFF WBC: CPT

## 2020-07-21 NOTE — PROGRESS NOTES
[facility] [address] [facility] [address], [address] [facility],[address]  [address] [address]  Dept: [phone]  Dept Fax: [phone]: [phone]    Visit Date:7/21/2020     Rubi Faulkner is a 76 y.o. male who presents today for:   Chief Complaint   Patient presents with    Follow-up     bladder CA        HPI:   This is a 43-year-old man with muscle invasive bladder cancer. He had cystoscopy in August 2019 patient developed gross hematuria and flank pain end of December 2018. He had CT urogram on December 13, 2018 that showed irregular thickening of the right posterior lateral bladder wall. Findings were highly suspicious for the presence of malignancy. On January 28, 2019 the patient underwent cystoscopy by Dr. Shawn Davis. It showed bladder trabeculations and bladder stone, on the right side there was a diverticulum with a bladder stone and bladder tumor inside. On January 30, 2019 the patient underwent TURBT. Final pathology report showed invasive high-grade urothelial carcinoma, clear cell variant. Deep smooth muscle was involved by carcinoma. Dr. Shawn Davis recommended neoadjuvant chemotherapy before bladder resection. However, the patient was absolutely opposed to having a bladder surgery. He agreed to concurrent chemoradiation with understanding that outcome is inferior to neoadjuvant chemo and surgery. He started concurrent chemoradiation on March 18, 2019 and completed on May 15, 2019. Overall he tolerated treatment with cisplatin well. He had MRI of the pelvis done approximately 4 weeks from completion of concurrent chemoradiation, it showed nice response to treatment. He had cystoscopy in August 2019 that did not show residual tumor. On March 31, 2020 due to recurrent hematuria, the patient had cystoscopy which showed tumor along the rim of the bladder diverticulum which was located on the right side.  The largest tumor was 6 cm in size.  Biopsy showed invasive high-grade urothelial carcinoma, clear-cell variant.  Deep smooth muscle was present and involved by carcinoma. He received recommendation to proceed with bladder surgery. The patient is refusing surgery at this point of time, he would like to see what to neoadjuvant chemotherapy will due to his bladder cancer   He started neoadjuvant chemotherapy with Gemzar and cisplatin on April 28, 2020. Interval history on 07/21/2020: The patient presents to the medical oncology clinic for cycle number 3-day 15 of chemotherapy with Gemzar and cisplatin. He reports that he tolerates treatment well. He denies having any nausea no vomiting. He did not have drug-induced fever after Gemzar. He denies having any peripheral neuropathy. His hematuria stopped after TURP. He denies having any flank pain.   He denies having any new complaints since last visit with me      HPI   Past Medical History:   Diagnosis Date    Anxiety     Cancer ([address]     bladder chemo    Cataract     LEFT EYE    Diabetes mellitus ([address]     Glaucoma     LEFT EYE    History of hematuria     Hyperlipidemia    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     SINCE CHILDHOOD    Thyroid disease     Type 2 diabetes mellitus without complication ([address]       Past Surgical History:   Procedure Laterality Date    CYSTOSCOPY N/A 1/30/2019    TRANSURETHRAL RESECTION BLADDER TUMOR, CYSTOLITHOLAPAXY performed by Billy Winter MD at [address] 3/31/2020    CYSTOSCOPY WITH TRANSURETHRAL RESECTION OF BLADDER TUMOR REMOVAL OF BLADDER STONE performed by Diana Rojas MD at [facility]      arm as kid    TONSILLECTOMY        Family History   Problem Relation Age of Onset    Other Mother         BRAIN TUMOR    Stroke Mother     Heart Disease Father     High Blood Pressure Father     Cancer Sister     Diabetes Neg Hx       Social History     Tobacco Use    Smoking status: Current Every Day Smoker     Packs/day: 1.00     Years: 53.00     Pack years: 53.00     Types: Cigarettes    Smokeless tobacco: Never Used    Tobacco comment: depending on tumor analysis   Substance Use Topics    Alcohol use: No     Comment: not in 40 years      Current Outpatient Medications   Medication Sig Dispense Refill    erythromycin (ROMYCIN) 5 MG/GM ophthalmic ointment apply to the left eye twice a day 1 g 1    OLANZapine (ZYPREXA) 5 MG tablet Take 1 tablet by mouth nightly 30 tablet 3    prochlorperazine (COMPAZINE) 5 MG tablet Take 1 tablet by mouth every 6 hours as needed for Nausea 60 tablet 1    CHANTIX CONTINUING MONTH SAV 1 MG tablet daily      linagliptin (TRADJENTA) 5 MG tablet Take 5 mg by mouth daily      Multiple Vitamins-Minerals (CVS SPECTRAVITE ADULT 50+ PO) Take by mouth      Saw [place] 450 MG CAPS Take by mouth daily Indications: 3-4 times per week PRN       atorvastatin (LIPITOR) 40 MG tablet Take 40 mg by mouth daily      metFORMIN (GLUCOPHAGE) 500 MG tablet Take 500 mg by mouth 4 times daily       levothyroxine (SYNTHROID) 125 MCG tablet Take 125 mcg by mouth Daily      prednisoLONE acetate (PRED FORTE) 1 % ophthalmic suspension 1 drop 4 times daily      timolol (BETIMOL) 0.5 % ophthalmic solution 1 drop 2 times daily      brimonidine (ALPHAGAN) 0.2 % ophthalmic solution 1 drop 3 times daily      cyclopentolate (CYCLOGYL) 1 % ophthalmic solution 1 drop once       No current facility-administered medications for this visit.        No Known Allergies   Health Maintenance   Topic Date Due    Hepatitis C screen  1945    DTaP/Tdap/Td vaccine (1 - Tdap) 12/16/1964    Shingles Vaccine (1 of 2) 12/16/1995    Colon cancer screen colonoscopy  12/16/1995    Annual Wellness Visit (AWV)  06/19/2019    Flu vaccine (1) 09/01/2020    Low dose CT lung screening  04/13/2021    A1C test (Diabetic or Prediabetic)  05/05/2021    Lipid screen  05/05/2021    Pneumococcal 65+ years Vaccine  Completed    AAA screen Completed    Hepatitis A vaccine  Aged Out    Hepatitis B vaccine  Aged Out    Hib vaccine  Aged Out    Meningococcal (ACWY) vaccine  Aged Out        Subjective:   Review of Systems   Constitutional: Negative for activity change, appetite change, fatigue and fever. HENT: Negative for congestion, dental problem, facial swelling, hearing loss, mouth sores, nosebleeds, sore throat, tinnitus and trouble swallowing. Eyes: Negative for discharge and visual disturbance. Respiratory: Negative for cough, chest tightness, shortness of breath and wheezing. Cardiovascular: Negative for chest pain, palpitations and leg swelling. Gastrointestinal: Negative for abdominal distention, abdominal pain, blood in stool, constipation, diarrhea, nausea, rectal pain and vomiting. Endocrine: Negative for cold intolerance, polydipsia and polyuria. Genitourinary: Negative for decreased urine volume, difficulty urinating, dysuria, flank pain, hematuria and urgency. Musculoskeletal: Negative for arthralgias, back pain, gait problem, joint swelling, myalgias and neck stiffness. Skin: Negative for color change, rash and wound. Neurological: Negative for dizziness, tremors, seizures, speech difficulty, weakness, light-headedness, numbness and headaches. Hematological: Negative for adenopathy. Does not bruise/bleed easily. Psychiatric/Behavioral: Negative for confusion and sleep disturbance. The patient is not nervous/anxious. Objective:   Physical Exam  Vitals signs reviewed. Constitutional:       General: He is not in acute distress. Appearance: He is well-developed. HENT:      Head: Normocephalic. Mouth/Throat:      Pharynx: No oropharyngeal exudate. Eyes:      General: No scleral icterus. Right eye: No discharge. Left eye: No discharge. Pupils: Pupils are equal, round, and reactive to light. Neck:      Musculoskeletal: Normal range of motion and neck supple. cystectomy. This approach is supported by  evidence that neoadjuvant cisplatin-based chemotherapy improves survival compared with locoregional treatment alone. The patient refused having bladder surgery. He understood that the only potentially curative option is surgery. For patients who are  medically inoperable due to comorbidity and frailty, chemoradiation may be a reasonable alternative. Some elderly patients with invasive bladder cancer can be cured by cisplatin-based regimen and radiation therapy or at least effectively palliated for sustained periods measured in months to years. The patient understood that this was not the preferred and recommended approach, but he decided to proceed with this therapy. He started concurrent chemoradiation on March 18, 2019 and completed on May 15, 2019. Overall he tolerated treatment with cisplatin well. He had MRI of the pelvis done approximately 4 weeks from completion of concurrent chemoradiation that showed nice response to treatment. Surveillance cystoscopy in October 2019 showed no residual macroscopic tumor. However, In March 2020 the patient developed recurrent hematuria. He underwent cystoscopy with TURP, it showed tumor along the rim of the bladder diverticulum which was located on the right side.  The largest tumor was 6cm in size. Biopsy showediInvasive high-grade urothelial carcinoma, clear-cell variant.  Deep smooth muscle was present and involved by carcinoma. Patient continues to be very hesitant to proceed with bladder resection. He will prefer to receive some form of systemic chemotherapy, hoping that it will give him additional time before the need for the surgery. The patient had CT of the chest that showed small indeterminate pulmonary nodule. MRI confirmed the presence of the tumor in the bladder but no pathologically enlarged lymph nodes. We requested PDL 1 staining on bladder biopsy from March 31, 2020, Combined Positive Score:   < 10.   This score predicts poor response to immunotherapy. Had a lengthy discussion with the patient about further management. I emphasized again that the only possibility of cure is bladder surgery. The patient remains opposed to having bladder surgery. 2. Neoadjuvant chemotherapy with Gemzar and cisplatin. Started on April 28, 2020. Today the patient presents for cycle number 3-day 15. With cycle #2 he was not able to receive day 15 of Gemzar cisplatin. Today his WBC is 1.8, ANC is 0.6. Hemoglobin is 9.9 and platelet count is only 20,000. No treatment today. Patient denies having any bleeding. Neutropenic and thrombocytopenic precautions reviewed with the patient he is scheduled to have cystoscopy end of August.  Short of pancytopenia the patient tolerates treatment well. No Gemzar induced fever. He denies having any peripheral neuropathy, no hearing problems. His hematuria stopped after TURP procedure  3. Left eye infection. Much improved with erythromycin cream.No follow-ups on file. Orders Placed:   Orders Placed This Encounter   Procedures    CBC Auto Differential     Standing Status:   Future     Standing Expiration Date:   7/21/2021        Medications Prescribed:   No orders of the defined types were placed in this encounter.

## 2020-07-21 NOTE — PATIENT INSTRUCTIONS
1.  No treatment today due to neutropenia and thrombocytopenia  2. Patient will come only for labs: CBC in 1 week.   Base on labs Dr. Christa Benavidez will make decision about next follow-up

## 2020-07-28 ENCOUNTER — HOSPITAL ENCOUNTER (OUTPATIENT)
Age: 75
Discharge: HOME | End: 2020-07-28
Payer: MEDICARE

## 2020-07-28 DIAGNOSIS — C67.9 UROTHELIAL CARCINOMA OF BLADDER (HCC): ICD-10-CM

## 2020-07-28 LAB
BASOPHILS # BLD: 0.3 %
BASOPHILS ABSOLUTE: 0 THOU/MM3 (ref 0–0.1)
EOSINOPHIL # BLD: 3.5 %
EOSINOPHILS ABSOLUTE: 0.1 THOU/MM3 (ref 0–0.4)
ERYTHROCYTE [DISTWIDTH] IN BLOOD BY AUTOMATED COUNT: 17.4 % (ref 11.5–14.5)
ERYTHROCYTE [DISTWIDTH] IN BLOOD BY AUTOMATED COUNT: 61.2 FL (ref 35–45)
HCT VFR BLD CALC: 29.5 % (ref 42–52)
HEMOGLOBIN: 9.4 GM/DL (ref 14–18)
IMMATURE GRANS (ABS): 0.01 THOU/MM3 (ref 0–0.07)
IMMATURE GRANULOCYTES: 0.3 %
LYMPHOCYTES # BLD: 50.3 %
LYMPHOCYTES ABSOLUTE: 1.5 THOU/MM3 (ref 1–4.8)
MCH RBC QN AUTO: 33.3 PG (ref 26–33)
MCHC RBC AUTO-ENTMCNC: 31.9 GM/DL (ref 32.2–35.5)
MCV RBC AUTO: 104.6 FL (ref 80–94)
MONOCYTES # BLD: 16.8 %
MONOCYTES ABSOLUTE: 0.5 THOU/MM3 (ref 0.4–1.3)
NUCLEATED RED BLOOD CELLS: 1 /100 WBC
PLATELET # BLD: 119 THOU/MM3 (ref 130–400)
PLATELET ESTIMATE: ABNORMAL
PMV BLD AUTO: 10.6 FL (ref 9.4–12.4)
RBC # BLD: 2.82 MILL/MM3 (ref 4.7–6.1)
SCAN OF BLOOD SMEAR: NORMAL
SEG NEUTROPHILS: 28.8 %
SEGMENTED NEUTROPHILS ABSOLUTE COUNT: 0.8 THOU/MM3 (ref 1.8–7.7)
WBC # BLD: 2.9 THOU/MM3 (ref 4.8–10.8)

## 2020-07-28 PROCEDURE — 36415 COLL VENOUS BLD VENIPUNCTURE: CPT

## 2020-07-28 PROCEDURE — 85025 COMPLETE CBC W/AUTO DIFF WBC: CPT

## 2020-08-25 ENCOUNTER — TELEPHONE (OUTPATIENT)
Dept: UROLOGY | Age: 75
End: 2020-08-25

## 2020-08-25 ENCOUNTER — PROCEDURE VISIT (OUTPATIENT)
Dept: UROLOGY | Age: 75
End: 2020-08-25
Payer: MEDICARE

## 2020-08-25 VITALS — BODY MASS INDEX: 29.58 KG/M2 | HEIGHT: 67 IN | TEMPERATURE: 97.2 F | WEIGHT: 188.5 LBS

## 2020-08-25 LAB
BILIRUBIN URINE: NEGATIVE
BLOOD URINE, POC: NEGATIVE
CHARACTER, URINE: CLEAR
COLOR, URINE: YELLOW
GLUCOSE URINE: NEGATIVE MG/DL
KETONES, URINE: NEGATIVE
LEUKOCYTE CLUMPS, URINE: ABNORMAL
NITRITE, URINE: NEGATIVE
PH, URINE: 7 (ref 5–9)
PROTEIN, URINE: NEGATIVE MG/DL
SPECIFIC GRAVITY, URINE: 1.02 (ref 1–1.03)
UROBILINOGEN, URINE: 0.2 EU/DL (ref 0–1)

## 2020-08-25 PROCEDURE — 4040F PNEUMOC VAC/ADMIN/RCVD: CPT | Performed by: UROLOGY

## 2020-08-25 PROCEDURE — 4004F PT TOBACCO SCREEN RCVD TLK: CPT | Performed by: UROLOGY

## 2020-08-25 PROCEDURE — G8427 DOCREV CUR MEDS BY ELIG CLIN: HCPCS | Performed by: UROLOGY

## 2020-08-25 PROCEDURE — 81003 URINALYSIS AUTO W/O SCOPE: CPT | Performed by: UROLOGY

## 2020-08-25 PROCEDURE — 3017F COLORECTAL CA SCREEN DOC REV: CPT | Performed by: UROLOGY

## 2020-08-25 PROCEDURE — 52281 CYSTOSCOPY AND TREATMENT: CPT | Performed by: UROLOGY

## 2020-08-25 PROCEDURE — 1123F ACP DISCUSS/DSCN MKR DOCD: CPT | Performed by: UROLOGY

## 2020-08-25 PROCEDURE — G8417 CALC BMI ABV UP PARAM F/U: HCPCS | Performed by: UROLOGY

## 2020-08-25 PROCEDURE — 99213 OFFICE O/P EST LOW 20 MIN: CPT | Performed by: UROLOGY

## 2020-08-25 RX ORDER — TAMSULOSIN HYDROCHLORIDE 0.4 MG/1
0.4 CAPSULE ORAL DAILY
Qty: 90 CAPSULE | Refills: 3 | Status: SHIPPED | OUTPATIENT
Start: 2020-08-25

## 2020-08-25 NOTE — PATIENT INSTRUCTIONS
take.  How can you care for yourself at home? · Ask your family, friends, and coworkers for support. You have a better chance of quitting if you have help and support. · Join a support group, such as Nicotine Anonymous, for people who are trying to quit smoking. · Consider signing up for a smoking cessation program, such as the American Lung Association's Freedom from Smoking program.  · Get text messaging support. Go to the website at www.smokefree. gov to sign up for the [facility] program.  · Set a quit date. Pick your date carefully so that it is not right in the middle of a big deadline or stressful time. Once you quit, do not even take a puff. Get rid of all ashtrays and lighters after your last cigarette. Clean your house and your clothes so that they do not smell of smoke. · Learn how to be a nonsmoker. Think about ways you can avoid those things that make you reach for a cigarette. ? Avoid situations that put you at greatest risk for smoking. For some people, it is hard to have a drink with friends without smoking. For others, they might skip a coffee break with coworkers who smoke. ? Change your daily routine. Take a different route to work or eat a meal in a different place. · Cut down on stress. Calm yourself or release tension by doing an activity you enjoy, such as reading a book, taking a hot bath, or gardening. · Talk to your doctor or pharmacist about nicotine replacement therapy, which replaces the nicotine in your body. You still get nicotine but you do not use tobacco. Nicotine replacement products help you slowly reduce the amount of nicotine you need. These products come in several forms, many of them available over-the-counter:  ? Nicotine patches  ? Nicotine gum and lozenges  ? Nicotine inhaler  · Ask your doctor about bupropion (Wellbutrin) or varenicline (Chantix), which are prescription medicines. They do not contain nicotine.  They help you by reducing withdrawal symptoms, such as stress and anxiety. · Some people find hypnosis, acupuncture, and massage helpful for ending the smoking habit. · Eat a healthy diet and get regular exercise. Having healthy habits will help your body move past its craving for nicotine. · Be prepared to keep trying. Most people are not successful the first few times they try to quit. Do not get mad at yourself if you smoke again. Make a list of things you learned and think about when you want to try again, such as next week, next month, or next year. Where can you learn more? Go to https://[employer]. org and sign in to your [employer] account. Enter K175 in the [employer] box to learn more about \"Stopping Smoking: Care Instructions. \"     If you do not have an account, please click on the \"Sign Up Now\" link. Current as of: March 12, 2020               Content Version: 12.5  © 6995-4915 Healthwise, Incorporated. Care instructions adapted under license by [facility] ([facility]). If you have questions about a medical condition or this instruction, always ask your healthcare professional. [address] any warranty or liability for your use of this information.

## 2020-08-25 NOTE — PROGRESS NOTES
10 mL of 2% Lidocaine Urojet inserted into the penis and clamped for 5 minutes before cystoscopy procedure.

## 2020-08-25 NOTE — TELEPHONE ENCOUNTER
Patient scheduled for his CT Urogram on 09- at 1:40pm with an arrival of 1:10pm with [facility]. Patient advised to have nothing to eat or drink 4-hours prior.

## 2020-08-25 NOTE — PROGRESS NOTES
Endy Gomes MD        [address] [place] [address]  Dept: [phone]  Dept Fax: [phone]: [address] [address] Urology Office Note -     Patient:  Frederic Panchal  YOB: 1945    The patient is a 76 y.o. male who presents today for evaluation of the following problems:   Chief Complaint   Patient presents with    Cystoscopy    Bladder Cancer        HISTORY OF PRESENT ILLNESS:     Bladder cancer  Onset was  Years ago  Overall, the problem(s) are unchanged. Severity is described as severe. Associated Symptoms: No dysuria, some gross hematuria. Current Pain Severity: 0    Here for surveillance cystoscopy  Has had multiple resections , chemo x 2 rounds    Summary of Previous Records:  Hx of muscle invasive bladder cancer  Seeing oncology  Discussed risks of not undergoing surgery  Pt is electing to still monitor. I strongly urged against this  Will follow up in two months with cystoscopy after second round of chemotherapy    Requested/reviewed records from [facility] office and/or outside [de-identified]    (Patient's old records have been requested, reviewed and pertinent findings summarized in today's note.)    Procedures Today:    Cystoscopy Operative Note  Surgeon: Afton Nyhan [facility]   Anesthesia: Urethral 2%  Indications: bladder cancer  Position: supine  Findings: meatal stricture- dilated. Scope able to be passed. Small stone in urethra. Large dystrophic calcifications in the diverticulum. Moderate trabeculations. Lateral lobe hypertrophy ++  The patient was prepped and draped in the usual sterile fashion. The flexible cystoscope was advanced through the urethra and into the bladder. The bladder was thoroughly inspected and the following was noted:    Residual Urine: moderate  Urethra: meatal stricture.  Small stone in urethra  Prostate: lateral lobe hypertrophy  Bladder: CYSTOSCOPY N/A 3/31/2020    CYSTOSCOPY WITH TRANSURETHRAL RESECTION OF BLADDER TUMOR REMOVAL OF BLADDER STONE performed by Georgiana Landers MD at [facility]      arm as kid    TONSILLECTOMY       Family History   Problem Relation Age of Onset    Other Mother         BRAIN TUMOR    Stroke Mother     Heart Disease Father     High Blood Pressure Father     Cancer Sister     Diabetes Neg Hx      Outpatient Medications Marked as Taking for the 8/25/20 encounter (Procedure visit) with Georgiana Landers MD   Medication Sig Dispense Refill    tamsulosin (FLOMAX) 0.4 MG capsule Take 1 capsule by mouth daily 90 capsule 3    erythromycin (ROMYCIN) 5 MG/GM ophthalmic ointment apply to the left eye twice a day 1 g 1    CHANTIX CONTINUING MONTH SAV 1 MG tablet daily      Multiple Vitamins-Minerals (CVS SPECTRAVITE ADULT 50+ PO) Take by mouth      Saw [place] 450 MG CAPS Take by mouth daily Indications: 3-4 times per week PRN       atorvastatin (LIPITOR) 40 MG tablet Take 40 mg by mouth daily      levothyroxine (SYNTHROID) 125 MCG tablet Take 125 mcg by mouth Daily      prednisoLONE acetate (PRED FORTE) 1 % ophthalmic suspension 1 drop 4 times daily      timolol (BETIMOL) 0.5 % ophthalmic solution 1 drop 2 times daily      brimonidine (ALPHAGAN) 0.2 % ophthalmic solution 1 drop 3 times daily      cyclopentolate (CYCLOGYL) 1 % ophthalmic solution 1 drop once         Patient has no known allergies.   Social History     Tobacco Use   Smoking Status Current Every Day Smoker    Packs/day: 1.00    Years: 53.00    Pack years: 53.00    Types: Cigarettes   Smokeless Tobacco Never Used   Tobacco Comment    depending on tumor analysis      (If patient a smoker, smoking cessation counseling offered)   Social History     Substance and Sexual Activity   Alcohol Use No    Comment: not in 40 years       REVIEW OF SYSTEMS:  Constitutional: negative  Eyes: negative  Respiratory: negative  Cardiovascular: negative  Gastrointestinal: negative  Genitourinary: see HPI  Musculoskeletal: negative  Skin: negative   Neurological: negative  Hematological/Lymphatic: negative  Psychological: negative        Physical Exam:    This a 76 y.o. male  Vitals:    08/25/20 1417   Temp: 97.2 °F (36.2 °C)     Body mass index is 29.52 kg/m². Constitutional: Patient in no acute distress;         Assessment and Plan        1. Malignant neoplasm of urinary bladder, unspecified site ([facility]    2. Bladder stones    3. Bladder diverticulum               Plan:      Bladder cancer- ct urogram to rule out upper tract disease/metastases  Send urine for FISH. Start flomax. Return in 2-3 weeks to discuss results.            Prescriptions Ordered:  Orders Placed This Encounter   Medications    tamsulosin (FLOMAX) 0.4 MG capsule     Sig: Take 1 capsule by mouth daily     Dispense:  90 capsule     Refill:  3      Orders Placed:  Orders Placed This Encounter   Procedures    CT UROGRAM     Standing Status:   Future     Number of Occurrences:   1     Standing Expiration Date:   8/25/2021    POCT Urinalysis No Micro (Auto)    [place] CYSTOURETHROSCOPY            CHRISTIAN Langston MD

## 2020-09-09 ENCOUNTER — HOSPITAL ENCOUNTER (OUTPATIENT)
Dept: CT IMAGING | Age: 75
Discharge: HOME | End: 2020-09-09
Payer: MEDICARE

## 2020-09-09 LAB — POC CREATININE WHOLE BLOOD: 0.9 MG/DL (ref 0.5–1.2)

## 2020-09-09 PROCEDURE — 82565 ASSAY OF CREATININE: CPT

## 2020-09-09 PROCEDURE — 74178 CT ABD&PLV WO CNTR FLWD CNTR: CPT

## 2020-09-09 PROCEDURE — 6360000004 HC RX CONTRAST MEDICATION: Performed by: UROLOGY

## 2020-09-09 RX ADMIN — IOPAMIDOL 80 ML: 755 INJECTION, SOLUTION INTRAVENOUS at 13:42

## 2020-09-16 ENCOUNTER — OFFICE VISIT (OUTPATIENT)
Dept: UROLOGY | Age: 75
End: 2020-09-16
Payer: MEDICARE

## 2020-09-16 VITALS — HEIGHT: 67 IN | BODY MASS INDEX: 29.03 KG/M2 | TEMPERATURE: 98.1 F | WEIGHT: 185 LBS

## 2020-09-16 PROCEDURE — 4040F PNEUMOC VAC/ADMIN/RCVD: CPT | Performed by: UROLOGY

## 2020-09-16 PROCEDURE — 4004F PT TOBACCO SCREEN RCVD TLK: CPT | Performed by: UROLOGY

## 2020-09-16 PROCEDURE — G8428 CUR MEDS NOT DOCUMENT: HCPCS | Performed by: UROLOGY

## 2020-09-16 PROCEDURE — G8417 CALC BMI ABV UP PARAM F/U: HCPCS | Performed by: UROLOGY

## 2020-09-16 PROCEDURE — 1123F ACP DISCUSS/DSCN MKR DOCD: CPT | Performed by: UROLOGY

## 2020-09-16 PROCEDURE — 3017F COLORECTAL CA SCREEN DOC REV: CPT | Performed by: UROLOGY

## 2020-09-16 PROCEDURE — 99214 OFFICE O/P EST MOD 30 MIN: CPT | Performed by: UROLOGY

## 2020-09-16 NOTE — PROGRESS NOTES
Lida Sheikh MD        [address] [facility] [id number]  Dept: [phone]  Dept Fax: 31 : [phone] [address] Urology Office Note -     Patient:  Abbey Tracy  YOB: 1945    The patient is a 76 y.o. male who presents today for evaluation of the following problems:   Chief Complaint   Patient presents with    Results     CT urogram / FISH test         HISTORY OF PRESENT ILLNESS:     Bladder cancer  Onset was  Years ago  Overall, the problem(s) are stable. Severity is described as severe. Associated Symptoms: No dysuria, some gross hematuria. Current Pain Severity: 0     FISH test and CT Urogram results were given to the patient today. He presents to the office by himself. CT Urogram reveals a mild enlargement of the right kidney which shows marked asymmetric enhancement. Differential would be pyelonephritis, infiltrating neoplasm, infarction. Infarction is considered least likely. We discussed the benefits of doing a ureteroscopy. Pt is adamantly against this. Last surveillance cystoscopy was done on 8/25/20           Requested/reviewed records from [facility] office and/or outside physician/[facility]    (Patient's old records have been requested, reviewed and pertinent findings summarized in today's note.)    Procedures Today: N/A    Last several PSA's:  No results found for: PSA    Last total testosterone:  No results found for: TESTOSTERONE    Urinalysis today:  No results found for this visit on 09/16/20.     Last BUN and creatinine:  Lab Results   Component Value Date    BUN 16 07/21/2020     Lab Results   Component Value Date    CREATININE 0.9 07/21/2020       Imaging Reviewed during this Office Visit:   Lida Sheikh MD independently reviewed the images and verified the radiology reports from:    Ct Urogram    Result Date: 9/10/2020  PROCEDURE: CT UROGRAM Asymmetrically thickened and enhancing bladder wall high suspicious of neoplasia. 2. Mild enlargement of the right kidney which shows marked asymmetric enhancement. Differential would be pyelonephritis, infiltrating neoplasm, infarction. Infarction is considered least likely. **This report has been created using voice recognition software. It may contain minor errors which are inherent in voice recognition technology. ** Final report electronically signed by Dr. Halley Jackson on 9/10/2020 8:59 AM      PAST MEDICAL, FAMILY AND SOCIAL HISTORY:  Past Medical History:   Diagnosis Date    Anxiety     Cancer ([address]     bladder chemo    Cataract     LEFT EYE    Diabetes mellitus ([address]     Glaucoma     LEFT EYE    History of hematuria     Hyperlipidemia    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     SINCE CHILDHOOD    Thyroid disease     Type 2 diabetes mellitus without complication ([address]      Past Surgical History:   Procedure Laterality Date    CYSTOSCOPY N/A 1/30/2019    TRANSURETHRAL RESECTION BLADDER TUMOR, CYSTOLITHOLAPAXY performed by Leigh Garces MD [facility] [address] [facility]    CYSTOSCOPY WITH TRANSURETHRAL RESECTION OF BLADDER TUMOR REMOVAL OF BLADDER STONE performed by Claire Barahona MD at [address] [facility] as kid    TONSILLECTOMY       Family History   Problem Relation Age of Onset    Other Mother         BRAIN TUMOR    Stroke Mother     Heart Disease Father     High Blood Pressure Father     Cancer Sister     Diabetes Neg Hx      Outpatient Medications Marked as Taking for the 9/16/20 encounter (Office Visit) with Claire Barahona MD   Medication Sig Dispense Refill    tamsulosin (FLOMAX) 0.4 MG capsule Take 1 capsule by mouth daily 90 capsule 3    OLANZapine (ZYPREXA) 5 MG tablet Take 1 tablet by mouth nightly 30 tablet 3    prochlorperazine (COMPAZINE) 5 MG tablet Take 1 tablet by mouth every 6 hours as needed for Nausea 60 tablet 1    CHANTIX CONTINUING MONTH SAV 1 MG tablet daily      linagliptin (TRADJENTA) 5 MG tablet Take 5 mg by mouth daily      Multiple Vitamins-Minerals ([employer] SPECTRAVITE ADULT 50+ PO) Take by mouth      Saw Brantley 450 MG CAPS Take by mouth daily Indications: 3-4 times per week PRN       atorvastatin (LIPITOR) 40 MG tablet Take 40 mg by mouth daily      metFORMIN (GLUCOPHAGE) 500 MG tablet Take 500 mg by mouth 4 times daily       levothyroxine (SYNTHROID) 125 MCG tablet Take 125 mcg by mouth Daily      prednisoLONE acetate (PRED FORTE) 1 % ophthalmic suspension 1 drop 4 times daily      timolol (BETIMOL) 0.5 % ophthalmic solution 1 drop 2 times daily      brimonidine (ALPHAGAN) 0.2 % ophthalmic solution 1 drop 3 times daily      cyclopentolate (CYCLOGYL) 1 % ophthalmic solution 1 drop once         Patient has no known allergies. Social History     Tobacco Use   Smoking Status Current Every Day Smoker    Packs/day: 1.00    Years: 53.00    Pack years: 53.00    Types: Cigarettes   Smokeless Tobacco Never Used   Tobacco Comment    depending on tumor analysis      (If patient a smoker, smoking cessation counseling offered)   Social History     Substance and Sexual Activity   Alcohol Use No    Comment: not in 40 years       REVIEW OF SYSTEMS:  Constitutional: negative  Eyes: negative  Respiratory: negative  Cardiovascular: negative  Gastrointestinal: negative  Genitourinary: see HPI  Musculoskeletal: negative  Skin: negative   Neurological: negative  Hematological/Lymphatic: negative  Psychological: negative        Physical Exam:    This a 76 y.o. male  Vitals:    09/16/20 1259   Temp: 98.1 °F (36.7 °C)     Body mass index is 28.98 kg/m². Constitutional: Patient in no acute distress;       Assessment and Plan        1. Malignant neoplasm of urinary bladder, unspecified site ([address]    2. Bladder stones    3. Bladder diverticulum    4.  Gross hematuria               Plan:        We discussed scheduling a  right ureteroscopy with possible ureteral bx and possible stent placement based on CT scan results. However- he is very adamant on not having this done. We discussed the repercussions of not looking into this. Pt is at high risk for metastases of his bladder cancer. Return in 3 months for a surveillance cystoscopy. Prescriptions Ordered:  No orders of the defined types were placed in this encounter. Orders Placed:  No orders of the defined types were placed in this encounter.            Pari Marcelino MD

## 2020-11-16 ENCOUNTER — TELEPHONE (OUTPATIENT)
Dept: UROLOGY | Age: 75
End: 2020-11-16

## 2020-11-16 NOTE — TELEPHONE ENCOUNTER
Patient called and demanded that we cancel his Cysto scheduled next month with Dr Celso Isbell. He says he doesn't like them and he felt it was too soon. I talked to him at length explaining the importance of keeping on top of his Bladder Cancer and he voiced understanding but still refused to come in and have another cysto at this time. He said when he has symptoms he will come in. Do you want to talk to him also or just send this to Dr Gilson Villanueva advise.

## 2022-09-16 ENCOUNTER — TRANSCRIBE ORDERS (OUTPATIENT)
Dept: ADMINISTRATIVE | Age: 77
End: 2022-09-16

## 2022-09-16 DIAGNOSIS — C67.9 UROTHELIAL CARCINOMA OF BLADDER (HCC): Primary | ICD-10-CM

## 2022-10-04 ENCOUNTER — HOSPITAL ENCOUNTER (OUTPATIENT)
Dept: CT IMAGING | Age: 77
Discharge: HOME | End: 2022-10-04
Payer: MEDICARE

## 2022-10-04 DIAGNOSIS — C67.9 UROTHELIAL CARCINOMA OF BLADDER (HCC): ICD-10-CM

## 2022-10-04 PROCEDURE — 74177 CT ABD & PELVIS W/CONTRAST: CPT

## 2022-10-04 PROCEDURE — 71260 CT THORAX DX C+: CPT

## 2022-10-04 PROCEDURE — 6360000004 HC RX CONTRAST MEDICATION: Performed by: NURSE PRACTITIONER

## 2022-10-04 RX ADMIN — IOPAMIDOL 85 ML: 755 INJECTION, SOLUTION INTRAVENOUS at 13:06

## 2022-11-21 ENCOUNTER — CLINICAL DOCUMENTATION (OUTPATIENT)
Dept: CASE MANAGEMENT | Age: 77
End: 2022-11-21

## 2022-11-21 ENCOUNTER — HOSPITAL ENCOUNTER (OUTPATIENT)
Dept: INFUSION THERAPY | Age: 77
Discharge: HOME | End: 2022-11-21
Payer: MEDICARE

## 2022-11-21 ENCOUNTER — OFFICE VISIT (OUTPATIENT)
Dept: ONCOLOGY | Age: 77
End: 2022-11-21
Payer: MEDICARE

## 2022-11-21 VITALS
HEIGHT: 67 IN | SYSTOLIC BLOOD PRESSURE: 113 MMHG | TEMPERATURE: 97.5 F | HEART RATE: 85 BPM | DIASTOLIC BLOOD PRESSURE: 54 MMHG | BODY MASS INDEX: 23.86 KG/M2 | WEIGHT: 152 LBS | OXYGEN SATURATION: 98 % | RESPIRATION RATE: 18 BRPM

## 2022-11-21 VITALS
HEIGHT: 67 IN | RESPIRATION RATE: 18 BRPM | TEMPERATURE: 97.5 F | OXYGEN SATURATION: 98 % | DIASTOLIC BLOOD PRESSURE: 54 MMHG | BODY MASS INDEX: 23.86 KG/M2 | SYSTOLIC BLOOD PRESSURE: 113 MMHG | WEIGHT: 152 LBS | HEART RATE: 85 BPM

## 2022-11-21 DIAGNOSIS — C67.8 MALIGNANT NEOPLASM OF OVERLAPPING SITES OF BLADDER (HCC): Primary | ICD-10-CM

## 2022-11-21 DIAGNOSIS — Z11.59 ENCOUNTER FOR SCREENING FOR OTHER VIRAL DISEASES: ICD-10-CM

## 2022-11-21 DIAGNOSIS — C67.8 MALIGNANT NEOPLASM OF OVERLAPPING SITES OF BLADDER (HCC): ICD-10-CM

## 2022-11-21 LAB
ABSOLUTE IMMATURE GRANULOCYTE: 0.02 THOU/MM3 (ref 0–0.07)
ALBUMIN SERPL-MCNC: 3.6 G/DL (ref 3.5–5.1)
ALP BLD-CCNC: 115 U/L (ref 38–126)
ALT SERPL-CCNC: 9 U/L (ref 11–66)
AST SERPL-CCNC: 12 U/L (ref 5–40)
BASINOPHIL, AUTOMATED: 0 % (ref 0–3)
BASOPHILS ABSOLUTE: 0 THOU/MM3 (ref 0–0.1)
BILIRUB SERPL-MCNC: 0.5 MG/DL (ref 0.3–1.2)
BILIRUBIN DIRECT: < 0.2 MG/DL (ref 0–0.3)
BUN, WHOLE BLOOD: 17 MG/DL (ref 8–26)
CHLORIDE, WHOLE BLOOD: 103 MEQ/L (ref 98–109)
CREATININE, WHOLE BLOOD: 1.2 MG/DL (ref 0.5–1.2)
EOSINOPHILS ABSOLUTE: 0.1 THOU/MM3 (ref 0–0.4)
EOSINOPHILS RELATIVE PERCENT: 1 % (ref 0–4)
GFR SERPL CREATININE-BSD FRML MDRD: > 60 ML/MIN/1.73M2
GLUCOSE, WHOLE BLOOD: 77 MG/DL (ref 70–108)
HBV SURFACE AB TITR SER: NEGATIVE {TITER}
HCT VFR BLD CALC: 40.4 % (ref 42–52)
HEMOGLOBIN: 12.9 GM/DL (ref 14–18)
HEPATITIS B SURFACE ANTIGEN: NEGATIVE
IMMATURE GRANULOCYTES: 0 %
IONIZED CALCIUM, WHOLE BLOOD: 1.3 MMOL/L (ref 1.12–1.32)
LYMPHOCYTES # BLD: 16 % (ref 15–47)
LYMPHOCYTES ABSOLUTE: 1.4 THOU/MM3 (ref 1–4.8)
MCH RBC QN AUTO: 29.4 PG (ref 26–33)
MCHC RBC AUTO-ENTMCNC: 31.9 GM/DL (ref 32.2–35.5)
MCV RBC AUTO: 92 FL (ref 80–94)
MONOCYTES ABSOLUTE: 0.6 THOU/MM3 (ref 0.4–1.3)
MONOCYTES: 7 % (ref 0–12)
PDW BLD-RTO: 14.2 % (ref 11.5–14.5)
PLATELET # BLD: 262 THOU/MM3 (ref 130–400)
PMV BLD AUTO: 9.3 FL (ref 9.4–12.4)
POTASSIUM, WHOLE BLOOD: 4.2 MEQ/L (ref 3.5–4.9)
RBC # BLD: 4.39 MILL/MM3 (ref 4.7–6.1)
SEG NEUTROPHILS: 76 % (ref 43–75)
SEGMENTED NEUTROPHILS ABSOLUTE COUNT: 7 THOU/MM3 (ref 1.8–7.7)
SODIUM, WHOLE BLOOD: 139 MEQ/L (ref 138–146)
TOTAL CO2, WHOLE BLOOD: 26 MEQ/L (ref 23–33)
TOTAL PROTEIN: 7.4 G/DL (ref 6.1–8)
WBC # BLD: 9.1 THOU/MM3 (ref 4.8–10.8)

## 2022-11-21 PROCEDURE — 4004F PT TOBACCO SCREEN RCVD TLK: CPT | Performed by: INTERNAL MEDICINE

## 2022-11-21 PROCEDURE — 36415 COLL VENOUS BLD VENIPUNCTURE: CPT

## 2022-11-21 PROCEDURE — 80047 BASIC METABLC PNL IONIZED CA: CPT

## 2022-11-21 PROCEDURE — 87340 HEPATITIS B SURFACE AG IA: CPT

## 2022-11-21 PROCEDURE — 99211 OFF/OP EST MAY X REQ PHY/QHP: CPT

## 2022-11-21 PROCEDURE — G8427 DOCREV CUR MEDS BY ELIG CLIN: HCPCS | Performed by: INTERNAL MEDICINE

## 2022-11-21 PROCEDURE — 99215 OFFICE O/P EST HI 40 MIN: CPT | Performed by: INTERNAL MEDICINE

## 2022-11-21 PROCEDURE — 80076 HEPATIC FUNCTION PANEL: CPT

## 2022-11-21 PROCEDURE — 86706 HEP B SURFACE ANTIBODY: CPT

## 2022-11-21 PROCEDURE — 1123F ACP DISCUSS/DSCN MKR DOCD: CPT | Performed by: INTERNAL MEDICINE

## 2022-11-21 PROCEDURE — G8420 CALC BMI NORM PARAMETERS: HCPCS | Performed by: INTERNAL MEDICINE

## 2022-11-21 PROCEDURE — G8484 FLU IMMUNIZE NO ADMIN: HCPCS | Performed by: INTERNAL MEDICINE

## 2022-11-21 PROCEDURE — 85025 COMPLETE CBC W/AUTO DIFF WBC: CPT

## 2022-11-21 NOTE — PATIENT INSTRUCTIONS
Reviewed labs and recent medical history. Discussed his 30lb weight loss and reviewed his recent CT scans  Discussed using Keytruda as a maintenance therapy. Plan placed. Will need chemotherapy teaching. Will obtain a PET/CT to evaluate extent of disease. Lab orders placed to be drawn today. Return to see MD in 2 weeks to review imaging and to start treatment.

## 2022-11-21 NOTE — PROGRESS NOTES
[address] CANCER [address] [place] [address]  Dept: [phone]  Loc: [phone]     Deysi Goldstein  1945   No ref. provider found   WILLIAN Preston CNP       Deysi Goldstein is a 68 y.o. with bladder cancer    CHIEF COMPLAINT  Chief Complaint   Patient presents with    New Patient     Urothelial carcinoma of bladder [facility])          HISTORY OF PRESENT ILLNESS    December 2018. Patient developed gross hematuria and flank pain. He had an emergency department encounter for hematuria that had been ongoing for 3 months. He had already been seen by urology who recommended cystoscopy and CAT scan but the patient did not want to do this testing because it was too invasive and he wanted \"minor fixes\". He believes that he had a yeast infection that requires treatment. He wants treatment with nystatin and refuses to return to the urologist.  He understands that his symptoms may relate to bladder cancer or kidney cancer. He has had 2 pound increase in weight. He also had diarrhea. He had been a smoker of 2 packs of cigarettes a day. The patient's urinalysis showed no yeast he had gross hematuria with pyuria and bacteria. He was told to go directly to his urologist.    12/20/2018. Seen by Dr. Daniele Le CT urogram had been performed which showed that the right ureter was displaced posteriorly by bladder diverticulum can staining a solid enhancing soft tissue mass in addition to a large calcification measuring 2.3 cm. The right lateral and posterior bladder wall was irregularly thickened highly concerning for neoplasm. There is also a calculus in the posterior dependent portion of the bladder. The bladder wall was mildly irregular. The prostate gland was normal in size and contain calcifications. There were no suspicious bone lesions.   Cystoscopy was performed which confirmed that there was a diverticulum which had a bladder stone and a bladder tumor inside of it and another stone in the bladder. We recommended TURBT and cystoscopy litholapaxy. 1/4/2019. CT scan of the chest confirmed no mediastinal hilar adenopathy or pulmonary lesions except for an 5 mm noncalcified nodule in the upper aspect of the right middle lobe. There are no suspicious bony lesions. 1/30/2019. The patient had transurethral resection of bladder tumor and cystolitholapaxy. Pathology showed invasive high-grade urothelial carcinoma, clear-cell variant. Deep smooth muscle was present and was involved by carcinoma flat urothelial carcinoma in situ is also seen. Lymphovascular invasion was not seen. 2/19/2019. Referred to Dr. Susy Abrams neoadjuvant chemotherapy before port bladder resection. Patient was absolutely opposed to having bladder surgery. He agreed to concurrent chemotherapy and radiation with the understanding that the outcome was inferior to neoadjuvant chemotherapy with surgery. 3/18/2019. Started concurrent chemoradiation which was completed on May 15, 2019. He tolerated the treatment with cisplatin well. 6/17/2019 MRI of the pelvis showed that the mass was smaller than 2.9 x 2.5 x 2.7 m and had previously measured 4.1 x 3.0 x 3.9 cm. There were persistent improved enhancing densities within the fat surrounding the mass which possibly represented lymphangitic spread of carcinoma. No pathologically enlarged lymph nodes were identified. There was stable uniform mild circumferential thickening of the remainder of the urinary bladder wall. August 2019 cystoscopy did not show residual tumor. 1/23/2020. Repeat MRI showed that the diverticulum persisted and the mass within it continue to shrink measuring 2.1 x 1.2 x 1.4 cm with irregularity of the outer margin of the mass. There is no other enhancement of the fat adjacent to the mass.   There is mild generalized circumferential thickening of the wall of the urinary bladder and no pathologically enlarged lymph nodes. 3/31/2020. Patient developed recurrent hematuria. He underwent cystoscopy which showed tumor along the rim of the bladder diverticulum measuring 6 cm. Biopsy once again showed invasive high-grade urothelial carcinoma clear-cell variant. Deep smooth muscle was present in the biopsy and was involved by carcinoma. Once again it was recommended to him that he proceed with bladder surgery. The patient refused. He wanted to see if more chemotherapy would help. 4/28/2020. Chemotherapy was given with Gemzar and cisplatin. He tolerated the treatment well denied any peripheral neuropathy. His hematuria stopped after his transurethral resection. 5/5/2020. The patient had a video visit with Dr. Susanna Barone at which time it was strongly recommended to him that he undergo cystectomy. He refused. They agreed to have cystoscopy after the second round of chemotherapy. He continued his chemotherapy which was complicated by neutropenic fever and drug-induced peripheral neuropathy. 8/25/2020. Surveillance cystoscopy showed no tumors. He had large dystrophic calcifications in the diverticulum. 9/10/2020. CT urogram showed asymmetrically thickened and enhancing bladder wall highly suspicious of neoplasia with mild enlargement of the right kidney showing marked asymmetric enhancement with a differential including pyelonephritis, infiltrating neoplasm and infarction. Dr. Susanna Barone discussed scheduling a right ureteroscopy with possible ureteral biopsy and stent placement but the patient was adamant about not having this done    10/4/2022. The patient did not seek medical attention again until this time at which he wanted evaluation with a CAT scan of the chest abdomen pelvis.   This showed recurrent bladder cancer with marked thickening of the right bladder wall and soft tissue mass of the right ureterovesical junction with possible involvement of the distal right ureter with resultant severe right hydroureteronephrosis. There was an exophytic soft tissue portion of this mass which measured 4.2 x 3.6 cm. Pathologically enlarged lymph nodes were seen. Atherosclerosis was seen. No suspicious bony lesions were seen. CAT scan of the chest showed doubly a new 4 mm nodule along the left major fissure and decreased size of a prominent right hilar lymph node since the prior examination from 2020. There is a small pericardial effusion measuring at most 4 mm which was new. October 2022. Mr. Daly Valdez had decreased appetite and had weight loss. He had abdominal pain that was not intense. He had no bleeding he said he had no infection or signs of dysuria. He had an antibiotic at home which she would take occasionally when he thought that he needed it. He denied headache and chest pain bone pain these reasons he sought medical attention. 10/31/2022. Mr. Daly Valdez was seen by Dr. MARROQUIN [facility] and had TURBT. The bladder outlet was severely occlusive secondary to by lobar prostatic hypertrophy. There was evidence of a large papillary lesion suggestive of malignancy covering the right lateral wall and obscuring the right ureteral orifice. Attempts to identify the ureteral orifice failed. There were multiple bladder stones along with 2 stones embedded in a diverticulum in the right lateral wall. 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 Lymphovascular invasion was not identified. 11/9/2022. The patient had called the office on 11/1/2022 with complaints of urinary frequency and his postvoid residual was elevated at nearly 400 cc. He was offered a Gutierrez and he declined. He was advised to take Flomax. He also was taking the antibiotic that he had at home the name of which she could not recall.   He did admit to dysuria but he said that this was longstanding and he denied gross hematuria but said that his urine was brown. Once again cystectomy with diversion was recommended and the patient refused. He chose to come to see us to discuss further medical treatment. He also refused nephrostomy tubes. Comorbidities include anxiety and depression, diabetes, glaucoma, hyperlipidemia, hypothyroidism, retinal detachment of the left eye since childhood, large hiatal hernia, continued smoking with a greater than 50-pack-year history of smoking    INTERVAL NOTE    11/21/2022. Mr. Nay Comer comes into the office today to discuss further medical treatment for his invasive bladder cancer. He discussed that immunotherapy would be the next intervention that we could try as he refuses cystectomy and has progressed through cisplatin containing therapy. He is interested in this. He has had poor appetite with weight loss. He denies headache bone pain chest pain but does have abdominal pain especially in his low mid abdomen which she describes as not intense. He says that he has not been bleeding since he had his TURBT. MONITORING PARAMETERS    Physical examinations, CAT scans, PET scans, MRIs    PAST MEDICAL HISTORY  Past Medical History:   Diagnosis Date    Anxiety     Cancer ([facility] [address]     bladder chemo    Cataract     LEFT EYE    Diabetes mellitus ([facility] [address]     Glaucoma     LEFT EYE    History of hematuria     Hyperlipidemia     Hypothyroidism     Retinal detachment of left eye with multiple breaks     SINCE CHILDHOOD    Thyroid disease     Type 2 diabetes mellitus without complication (HCC)         REVIEW OF SYSTEMS  Review of Systems   Constitutional:  Positive for appetite change, fatigue and unexpected weight change. Negative for chills, diaphoresis and fever. HENT:  Negative for dental problem, mouth sores, nosebleeds, rhinorrhea, sore throat and trouble swallowing. Eyes:  Positive for visual disturbance. Respiratory:  Positive for shortness of breath. Negative for cough. Cardiovascular:  Positive for palpitations. Negative for leg swelling. Gastrointestinal:  Negative for abdominal pain, constipation, diarrhea, nausea and vomiting. Endocrine: Negative for polyuria. Genitourinary:  Positive for frequency. Negative for dysuria, penile swelling and testicular pain. Musculoskeletal:  Positive for myalgias (generalized). Negative for gait problem. Skin:  Positive for pallor. Neurological:  Positive for weakness. Negative for syncope, light-headedness and headaches. Hematological:  Bruises/bleeds easily. Psychiatric/Behavioral:  Negative for self-injury, sleep disturbance and suicidal ideas. The patient is not nervous/anxious.        FAMILY HISTORY  Family History   Problem Relation Age of Onset    Other Mother         BRAIN TUMOR    Stroke Mother     Heart Disease Father     High Blood Pressure Father     Cancer Sister     Diabetes Neg Hx         SOCIAL HISTORY  Social History     Socioeconomic History    Marital status:      Spouse name: Not on file    Number of children: Not on file    Years of education: Not on file    Highest education level: Not on file   Occupational History    Not on file   Tobacco Use    Smoking status: Every Day     Packs/day: 1.00     Years: 53.00     Pack years: 53.00     Types: Cigarettes    Smokeless tobacco: Never    Tobacco comments:     11/21/22 stated depending on tumor analysis, however still not ready to quit - refused resources   Vaping Use    Vaping Use: Never used   Substance and Sexual Activity    Alcohol use: No     Comment: not in 40 years    Drug use: No    Sexual activity: Not on file   Other Topics Concern    Not on file   Social History Narrative    Not on file     Social Determinants of Health     Financial Resource Strain: Not on file   Food Insecurity: Not on file   Transportation Needs: Not on file   Physical Activity: Not on file   Stress: Not on file   Social Connections: Not on file   Intimate Partner Violence: Not on file   Housing Stability: Not on file        CURRENT MEDICATIONS  Current Outpatient Medications   Medication Sig Dispense Refill    tamsulosin (FLOMAX) 0.4 MG capsule Take 1 capsule by mouth daily 90 capsule 3    erythromycin (ROMYCIN) 5 MG/GM ophthalmic ointment apply to the left eye twice a day 1 g 1    linagliptin (TRADJENTA) 5 MG tablet Take 5 mg by mouth daily      Multiple Vitamins-Minerals ([employer] SPECTRAVITE ADULT 50+ PO) Take by mouth      Saw Palmetto 450 MG CAPS Take by mouth daily Indications: 3-4 times per week PRN       atorvastatin (LIPITOR) 40 MG tablet Take 40 mg by mouth daily      levothyroxine (SYNTHROID) 125 MCG tablet Take 125 mcg by mouth Daily      prednisoLONE acetate (PRED FORTE) 1 % ophthalmic suspension 1 drop 4 times daily      timolol (BETIMOL) 0.5 % ophthalmic solution 1 drop 2 times daily      brimonidine (ALPHAGAN) 0.2 % ophthalmic solution 1 drop 3 times daily      cyclopentolate (CYCLOGYL) 1 % ophthalmic solution 1 drop once      metFORMIN (GLUCOPHAGE) 500 MG tablet Take 500 mg by mouth 4 times daily        No current facility-administered medications for this visit. OARRS    PDMP Monitoring: He has had 1 prescription for Tylenol 3 in June of this year. No cause for concern. Last PDMP Josesito Roque as Reviewed [facility]):  Review User Review Instant Review Result   Brandon Walker 11/27/2022 11:02 PM Reviewed PDMP [1]     Last Controlled Substance Monitoring Documentation      Flowsheet Row Admission (Discharged) from 1/30/2019 in [address] [facility] The Prescription Monitoring Report for this patient was reviewed today. filed at 01/31/2019 1255   Periodic Controlled Substance Monitoring No signs of potential drug abuse or diversion identified. filed at 01/31/2019 1255          Urine Drug Screenings (1 yr)    No resulted procedures found.        Medication Contract and Consent for Opioid Use Documents Filed        No documents found                     PHYSICAL EXAM    Physical Exam  Vitals (Blood pressure is 113/54) and nursing note reviewed. Exam conducted with a chaperone present. Constitutional:       General: He is not in acute distress. Appearance: He is ill-appearing and toxic-appearing. He is not diaphoretic. Comments: Mr. Alvaro Mendoza is a well-developed well-nourished  male who is wearing sunglasses. 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 He now weighs 152 pounds. When he last saw Dr. Myles Rankin he weighed 188. HENT:      Head: Normocephalic and atraumatic. Nose: Nose normal.      Mouth/Throat:      Mouth: Mucous membranes are moist.      Pharynx: Oropharynx is clear. Eyes:      General: No scleral icterus. Left eye: Discharge present. Extraocular Movements: Extraocular movements intact. Conjunctiva/sclera: Conjunctivae normal.      Pupils: Pupils are equal, round, and reactive to light. Comments: Left eye is completely opacified and white, without vision   Cardiovascular:      Rate and Rhythm: Normal rate and regular rhythm. Pulses: Normal pulses. Heart sounds: Normal heart sounds. No murmur heard. Pulmonary:      Effort: Pulmonary effort is normal.      Breath sounds: Normal breath sounds. No wheezing, rhonchi or rales. Abdominal:      General: Bowel sounds are normal. There is no distension. Palpations: Abdomen is soft. There is no mass. Tenderness: There is no abdominal tenderness. There is no guarding or rebound. Hernia: No hernia is present. Musculoskeletal:         General: Normal range of motion. Cervical back: Normal range of motion and neck supple. No rigidity or tenderness. Right lower leg: No edema. Left lower leg: No edema. Lymphadenopathy:      Cervical: No cervical adenopathy. Skin:     General: Skin is warm and dry. Coloration: Skin is pale. Skin is not jaundiced.       Findings: No bruising or lesion. Neurological:      Mental Status: He is alert and oriented to person, place, and time. Cranial Nerves: Cranial nerve deficit present. Motor: Weakness present. Gait: Gait normal.      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         Behavior: Behavior normal.         Thought Content: Thought content normal.         Judgment: Judgment normal.        LABS/IMAGING    Laboratory data shows that his BUN is 17 and his creatinine is 1.2 calcium is normal.  Total protein is 7.4 with an albumin of 3.6. Transaminases are normal as is bilirubin. CBC shows white count 9.1 hemoglobin 12.9 with hematocrit of 40.4 and a platelet count 100,043. White blood cell differential count showed 76 polys, 16 lymphs, 7 monos, 1 eosinophil. PATHOLOGY/GENETICS    Cancer of the bladder, muscle invasive    ASSESSMENT and PLAN    1. Cancer of the bladder. He has adamantly refused cystectomy as he was diagnosed in 2018. He has tolerated combination chemotherapy and radiation therapy with cisplatin followed by Gemzar and cisplatin both with good responses. Now has worsening disease with a larger tumor and with right hydronephrosis. His creatinine is now 1.2 with an EGFR greater than 60. I doubt that his creatinine clearance would be adequate for more cisplatin. I would recommend that we try monotherapy with pembrolizumab. We have submitted this treatment plan and will have him come back to the office after it has been approved. He knows that tach to me is his only chance of cure. He refuses adamantly. 2.  Multiple comorbidities. He will continue on his current regimens for anxiety, diabetes, hyperlipidemia, hypothyroidism and others. 3.  Continued smoking. He has no desire to quit. 4.  Glaucoma. He continues on his multiple eyedrops. He knows to call if he has any change in his status, questions or concerns.         Sarah Borges MD 11/27/2022 11:02 PM

## 2022-11-22 NOTE — PROGRESS NOTES
Name: Monster Marmolejo  : 1945  MRN: [id number]    Oncology Navigation- Initial Note:    Intake-  Contact Type: Medical Oncology  Pt known to this office as he had previously received tx here for his bladder cancer dx. Pt returns for additional tx    Diagnosis: - Bladder    Home Disposition: Lives alone  -Independent  -Drives  Patient needs and barriers to care: Coordination of Care and Symptom Management     Referral Source: Outpatient    Receptive to Advanced Care Planning/ Palliative Care:  deferred    Interventions-   General Interventions: Nitesh program is familiar to pt; Pt is known to this Nitesh.        Education/Screenings:  yes    ONC POC:  -PET   -Discussed Immunotherapy every 3 weeks for pt's dx  -Discussed port placement- pt declines at this time  -Labs today  -Chemo teaching for Immunotherapy  -Return in 2 wks to review PET results, & to define plan     Referrals: Supportive Therapies  reviewed     Continuum of Care: Diagnosis/Active Treatment    Notes: Nitesh following to assist & support as needed    Electronically signed by Nikos Blas RN on 2022 at 8:55 AM

## 2022-11-26 ASSESSMENT — ENCOUNTER SYMPTOMS
NAUSEA: 0
SHORTNESS OF BREATH: 1
ABDOMINAL PAIN: 0
COUGH: 0
DIARRHEA: 0
CONSTIPATION: 0
TROUBLE SWALLOWING: 0
SORE THROAT: 0
VOMITING: 0
RHINORRHEA: 0

## 2022-11-29 NOTE — PROGRESS NOTES
New chemotherapy validation note:    Diagnosis for chemotherapy: Urothelial Carcinoma of Bladder     Regimen ordered: Lena Mcguire       Reference or literature used for validation: NCCN      Date literature or guideline last updated 6/2022     Deviation from literature or guideline used: n/a    Summary of any verbal or telephone information obtained: MARY JO Sidhu [facility], PharmD, BCPS  Clinical Pharmacist   11/29/2022 2:39 PM

## 2022-12-05 ENCOUNTER — HOSPITAL ENCOUNTER (OUTPATIENT)
Dept: INFUSION THERAPY | Age: 77
Discharge: HOME | End: 2022-12-05

## 2022-12-08 ENCOUNTER — HOSPITAL ENCOUNTER (OUTPATIENT)
Dept: PET IMAGING | Age: 77
Discharge: HOME | End: 2022-12-08
Payer: MEDICARE

## 2022-12-08 DIAGNOSIS — C67.8 MALIGNANT NEOPLASM OF OVERLAPPING SITES OF BLADDER (HCC): ICD-10-CM

## 2022-12-08 PROCEDURE — A9552 F18 FDG: HCPCS | Performed by: INTERNAL MEDICINE

## 2022-12-08 PROCEDURE — 78815 PET IMAGE W/CT SKULL-THIGH: CPT

## 2022-12-08 PROCEDURE — 3430000000 HC RX DIAGNOSTIC RADIOPHARMACEUTICAL: Performed by: INTERNAL MEDICINE

## 2022-12-08 RX ORDER — FLUDEOXYGLUCOSE F 18 200 MCI/ML
16.4 INJECTION, SOLUTION INTRAVENOUS
Status: COMPLETED | OUTPATIENT
Start: 2022-12-08 | End: 2022-12-08

## 2022-12-08 RX ADMIN — FLUDEOXYGLUCOSE F 18 16.4 MILLICURIE: 200 INJECTION, SOLUTION INTRAVENOUS at 10:17

## 2022-12-09 DIAGNOSIS — C67.8 MALIGNANT NEOPLASM OF OVERLAPPING SITES OF BLADDER (HCC): Primary | ICD-10-CM

## 2022-12-12 ENCOUNTER — CLINICAL DOCUMENTATION (OUTPATIENT)
Dept: CASE MANAGEMENT | Age: 77
End: 2022-12-12

## 2022-12-12 ENCOUNTER — HOSPITAL ENCOUNTER (OUTPATIENT)
Dept: INFUSION THERAPY | Age: 77
Discharge: HOME | End: 2022-12-12
Payer: MEDICARE

## 2022-12-12 ENCOUNTER — OFFICE VISIT (OUTPATIENT)
Dept: ONCOLOGY | Age: 77
End: 2022-12-12
Payer: MEDICARE

## 2022-12-12 VITALS
TEMPERATURE: 98 F | HEART RATE: 77 BPM | DIASTOLIC BLOOD PRESSURE: 58 MMHG | SYSTOLIC BLOOD PRESSURE: 129 MMHG | OXYGEN SATURATION: 98 % | RESPIRATION RATE: 16 BRPM

## 2022-12-12 VITALS
RESPIRATION RATE: 18 BRPM | TEMPERATURE: 97.7 F | HEART RATE: 81 BPM | HEIGHT: 67 IN | OXYGEN SATURATION: 98 % | BODY MASS INDEX: 22.91 KG/M2 | WEIGHT: 146 LBS | DIASTOLIC BLOOD PRESSURE: 57 MMHG | SYSTOLIC BLOOD PRESSURE: 108 MMHG

## 2022-12-12 DIAGNOSIS — C67.8 MALIGNANT NEOPLASM OF OVERLAPPING SITES OF BLADDER (HCC): Primary | ICD-10-CM

## 2022-12-12 DIAGNOSIS — E03.2 HYPOTHYROIDISM DUE TO MEDICATION: ICD-10-CM

## 2022-12-12 DIAGNOSIS — C67.8 MALIGNANT NEOPLASM OF OVERLAPPING SITES OF BLADDER (HCC): ICD-10-CM

## 2022-12-12 LAB
ABSOLUTE IMMATURE GRANULOCYTE: 0.02 THOU/MM3 (ref 0–0.07)
ALBUMIN SERPL-MCNC: 3.4 G/DL (ref 3.5–5.1)
ALP BLD-CCNC: 94 U/L (ref 38–126)
ALT SERPL-CCNC: 8 U/L (ref 11–66)
AST SERPL-CCNC: 14 U/L (ref 5–40)
BASINOPHIL, AUTOMATED: 0 % (ref 0–3)
BASOPHILS ABSOLUTE: 0 THOU/MM3 (ref 0–0.1)
BILIRUB SERPL-MCNC: 0.6 MG/DL (ref 0.3–1.2)
BILIRUBIN DIRECT: < 0.2 MG/DL (ref 0–0.3)
BUN, WHOLE BLOOD: 14 MG/DL (ref 8–26)
CHLORIDE, WHOLE BLOOD: 104 MEQ/L (ref 98–109)
CREATININE, WHOLE BLOOD: 1.2 MG/DL (ref 0.5–1.2)
EOSINOPHILS ABSOLUTE: 0.1 THOU/MM3 (ref 0–0.4)
EOSINOPHILS RELATIVE PERCENT: 1 % (ref 0–4)
GFR SERPL CREATININE-BSD FRML MDRD: > 60 ML/MIN/1.73M2
GLUCOSE, WHOLE BLOOD: 118 MG/DL (ref 70–108)
HCT VFR BLD CALC: 41 % (ref 42–52)
HEMOGLOBIN: 12.8 GM/DL (ref 14–18)
IMMATURE GRANULOCYTES: 0 %
IONIZED CALCIUM, WHOLE BLOOD: 1.3 MMOL/L (ref 1.12–1.32)
LYMPHOCYTES # BLD: 14 % (ref 15–47)
LYMPHOCYTES ABSOLUTE: 1.2 THOU/MM3 (ref 1–4.8)
MCH RBC QN AUTO: 28.7 PG (ref 26–33)
MCHC RBC AUTO-ENTMCNC: 31.2 GM/DL (ref 32.2–35.5)
MCV RBC AUTO: 92 FL (ref 80–94)
MONOCYTES ABSOLUTE: 0.5 THOU/MM3 (ref 0.4–1.3)
MONOCYTES: 5 % (ref 0–12)
PDW BLD-RTO: 14.6 % (ref 11.5–14.5)
PLATELET # BLD: 248 THOU/MM3 (ref 130–400)
PMV BLD AUTO: 9.2 FL (ref 9.4–12.4)
POTASSIUM, WHOLE BLOOD: 4.3 MEQ/L (ref 3.5–4.9)
RBC # BLD: 4.46 MILL/MM3 (ref 4.7–6.1)
SEG NEUTROPHILS: 79 % (ref 43–75)
SEGMENTED NEUTROPHILS ABSOLUTE COUNT: 7 THOU/MM3 (ref 1.8–7.7)
SODIUM, WHOLE BLOOD: 138 MEQ/L (ref 138–146)
TOTAL CO2, WHOLE BLOOD: 23 MEQ/L (ref 23–33)
TOTAL PROTEIN: 6.4 G/DL (ref 6.1–8)
WBC # BLD: 8.8 THOU/MM3 (ref 4.8–10.8)

## 2022-12-12 PROCEDURE — 80047 BASIC METABLC PNL IONIZED CA: CPT

## 2022-12-12 PROCEDURE — 99215 OFFICE O/P EST HI 40 MIN: CPT | Performed by: INTERNAL MEDICINE

## 2022-12-12 PROCEDURE — 96413 CHEMO IV INFUSION 1 HR: CPT

## 2022-12-12 PROCEDURE — 4004F PT TOBACCO SCREEN RCVD TLK: CPT | Performed by: INTERNAL MEDICINE

## 2022-12-12 PROCEDURE — G8420 CALC BMI NORM PARAMETERS: HCPCS | Performed by: INTERNAL MEDICINE

## 2022-12-12 PROCEDURE — 2580000003 HC RX 258: Performed by: INTERNAL MEDICINE

## 2022-12-12 PROCEDURE — G8484 FLU IMMUNIZE NO ADMIN: HCPCS | Performed by: INTERNAL MEDICINE

## 2022-12-12 PROCEDURE — 6360000002 HC RX W HCPCS: Performed by: INTERNAL MEDICINE

## 2022-12-12 PROCEDURE — 85025 COMPLETE CBC W/AUTO DIFF WBC: CPT

## 2022-12-12 PROCEDURE — G8427 DOCREV CUR MEDS BY ELIG CLIN: HCPCS | Performed by: INTERNAL MEDICINE

## 2022-12-12 PROCEDURE — 36415 COLL VENOUS BLD VENIPUNCTURE: CPT

## 2022-12-12 PROCEDURE — 99211 OFF/OP EST MAY X REQ PHY/QHP: CPT

## 2022-12-12 PROCEDURE — 80076 HEPATIC FUNCTION PANEL: CPT

## 2022-12-12 PROCEDURE — 1123F ACP DISCUSS/DSCN MKR DOCD: CPT | Performed by: INTERNAL MEDICINE

## 2022-12-12 RX ORDER — ONDANSETRON 2 MG/ML
8 INJECTION INTRAMUSCULAR; INTRAVENOUS
Status: CANCELLED | OUTPATIENT
Start: 2022-12-12

## 2022-12-12 RX ORDER — ACETAMINOPHEN 325 MG/1
650 TABLET ORAL
Status: CANCELLED | OUTPATIENT
Start: 2022-12-12

## 2022-12-12 RX ORDER — DIPHENHYDRAMINE HYDROCHLORIDE 50 MG/ML
50 INJECTION INTRAMUSCULAR; INTRAVENOUS
Status: CANCELLED | OUTPATIENT
Start: 2022-12-12

## 2022-12-12 RX ORDER — MEPERIDINE HYDROCHLORIDE 50 MG/ML
12.5 INJECTION INTRAMUSCULAR; INTRAVENOUS; SUBCUTANEOUS PRN
Status: CANCELLED | OUTPATIENT
Start: 2022-12-12

## 2022-12-12 RX ORDER — SODIUM CHLORIDE 9 MG/ML
5-250 INJECTION, SOLUTION INTRAVENOUS PRN
Status: DISCONTINUED | OUTPATIENT
Start: 2022-12-12 | End: 2022-12-13 | Stop reason: HOSPADM

## 2022-12-12 RX ORDER — SODIUM CHLORIDE 9 MG/ML
INJECTION, SOLUTION INTRAVENOUS CONTINUOUS
Status: CANCELLED | OUTPATIENT
Start: 2022-12-12

## 2022-12-12 RX ORDER — SODIUM CHLORIDE 9 MG/ML
5-250 INJECTION, SOLUTION INTRAVENOUS PRN
Status: CANCELLED | OUTPATIENT
Start: 2022-12-12

## 2022-12-12 RX ORDER — FAMOTIDINE 10 MG/ML
20 INJECTION, SOLUTION INTRAVENOUS
Status: CANCELLED | OUTPATIENT
Start: 2022-12-12

## 2022-12-12 RX ORDER — ONDANSETRON 4 MG/1
4 TABLET, FILM COATED ORAL EVERY 8 HOURS PRN
Qty: 20 TABLET | Refills: 1 | Status: SHIPPED | OUTPATIENT
Start: 2022-12-12

## 2022-12-12 RX ORDER — PROCHLORPERAZINE MALEATE 10 MG
10 TABLET ORAL EVERY 6 HOURS PRN
Qty: 60 TABLET | Refills: 1 | Status: SHIPPED | OUTPATIENT
Start: 2022-12-12

## 2022-12-12 RX ORDER — HEPARIN SODIUM (PORCINE) LOCK FLUSH IV SOLN 100 UNIT/ML 100 UNIT/ML
500 SOLUTION INTRAVENOUS PRN
Status: CANCELLED | OUTPATIENT
Start: 2022-12-12

## 2022-12-12 RX ORDER — ALBUTEROL SULFATE 90 UG/1
4 AEROSOL, METERED RESPIRATORY (INHALATION) PRN
Status: CANCELLED | OUTPATIENT
Start: 2022-12-12

## 2022-12-12 RX ORDER — ONDANSETRON 2 MG/ML
8 INJECTION INTRAMUSCULAR; INTRAVENOUS
Status: DISCONTINUED | OUTPATIENT
Start: 2022-12-12 | End: 2022-12-13 | Stop reason: HOSPADM

## 2022-12-12 RX ORDER — EPINEPHRINE 1 MG/ML
0.3 INJECTION, SOLUTION, CONCENTRATE INTRAVENOUS PRN
Status: CANCELLED | OUTPATIENT
Start: 2022-12-12

## 2022-12-12 RX ORDER — SODIUM CHLORIDE 9 MG/ML
5-40 INJECTION INTRAVENOUS PRN
Status: CANCELLED | OUTPATIENT
Start: 2022-12-12

## 2022-12-12 RX ADMIN — SODIUM CHLORIDE 200 MG: 9 INJECTION, SOLUTION INTRAVENOUS at 10:31

## 2022-12-12 RX ADMIN — SODIUM CHLORIDE 20 ML/HR: 9 INJECTION, SOLUTION INTRAVENOUS at 10:27

## 2022-12-12 ASSESSMENT — ENCOUNTER SYMPTOMS
DIARRHEA: 0
SHORTNESS OF BREATH: 0
SINUS PAIN: 0
VOMITING: 0
TROUBLE SWALLOWING: 0
CONSTIPATION: 0
SORE THROAT: 0
ABDOMINAL PAIN: 0
COUGH: 0
NAUSEA: 0

## 2022-12-12 NOTE — PROGRESS NOTES
Chemotherapy/Immunotherapy Teaching Checklist    Treatment Plan: Keytruda  Frequency: 3 weeks  Patient accompanied by self      Day of chemo instructions:  [x] Must have    [x] Eat light breakfast  [x] Bring pain medications/other routine medications scheduled during appointment time  [] Hold blood pressure medications morning of treatment    Treatment process:  [x] Flow of appointment  [x] IV access Peripheral start  or  PORT access process [x] EMLA cream  [x] Premedication/ Hydration  [x] Length of treatment  [x] Tour of clinic    Diet and hydration:  [x] Discuss Trigg diet    [x] Eating Hints book provided  [x] Importance of hydration 48- 64 ounces daily   [x] Hydration handout provided     Side Effects:  [x] Chemotherapy and you book provided  [x] Side effects and management discussed   [x] Diarrhea[x]Nausea/Vomiting [x]home antiemetic [x]hair loss[x]Neuropathy[x] Constipation[x] Fatigue[x]Mouth sores[x] Dehydration[x] Skin/Nail Changes []Pneumonitis [x]Colitis [x] Hepatitis [x]Thyroid changes  [x]Fertility issues (birth control, sperm/egg banking issues)    Labs:  [x]BMP- renal function and electrolytes discussed  [x] CBC- RBC, WBC with ANC, platelet counts discussed  [x]Understanding your Blood hand out given   [x]Neutropenia handout/Reduce infection handout [x]Thrombocytopenia handout   [x]Lazarus discussed    Complications that require immediate attention from physician:  [x]Fever 100.3 [x]signs of infection- chills, fever, burning with urination, worsening cough   [x]Uncontrolled vomiting [x]Uncontrolled diarrhea/constipation   [x]Unable to drink 48 ounces [x]Uncontrolled pain  [x] When to notify provider handout given  [x] After hours contact process discussed    Support Services:  [x] Financial navigator   [x]RN navigator explained  [x]Local cancer society  [x]     Patient verbalized understanding and all  questions answered. Encouraged to call for any concerns.  Approximately 30 minutes spent with patient.

## 2022-12-12 NOTE — PATIENT INSTRUCTIONS
Reviewed labs and recent medical history. Discussed the findings on his PET/CT. We reviewed the small nodule finding. We will continue to monitor this. Will plan on CT scans in the future. Okay to start treatment today, 12/12/22  Anti-nausea medications sent to his pharmacy. Return to see MD in 3 weeks for his next treatment.

## 2022-12-12 NOTE — PROGRESS NOTES
Name: Angel Stone  : 1945  MRN: [id number]    Oncology Navigation Follow-Up Note    Contact Type:  Medical Oncology    Subjective: appt with ONC    Objective:   ONC POC:  -discussed with pt getting CT chest * Lung BX-->pt resistant at this time  -Proceed with Immunotherapy->Keytruda start today. OP ONC RN will do chemo teaching prior to administration & provide written education for pt home reference  -RX: Compazine & Zofran  -Nutritional supplement samples & coupons given; pt lost another 6# since last visit  -Return to see Kianna Kim in 3 wks prior to pt's next cycle Keytruda     Assistance Needed: denies    Receptive to Advanced Care Planning / Palliative Care:  deferred    Referrals: N/A    Education: POC reiterated    Notes: Nitesh following to assist & support as needed.     Electronically signed by Ute Allen RN on 2022 at 1:01 PM

## 2022-12-12 NOTE — PROGRESS NOTES
there was a diverticulum which had a bladder stone and a bladder tumor inside of it and another stone in the bladder. We recommended TURBT and cystoscopy litholapaxy. 1/4/2019. CT scan of the chest confirmed no mediastinal hilar adenopathy or pulmonary lesions except for an 5 mm noncalcified nodule in the upper aspect of the right middle lobe. There are no suspicious bony lesions. 1/30/2019. The patient had transurethral resection of bladder tumor and cystolitholapaxy. Pathology showed invasive high-grade urothelial carcinoma, clear-cell variant. Deep smooth muscle was present and was involved by carcinoma flat urothelial carcinoma in situ is also seen. Lymphovascular invasion was not seen. 2/19/2019. Referred to Dr. Susy Abrams neoadjuvant chemotherapy before port bladder resection. Patient was absolutely opposed to having bladder surgery. He agreed to concurrent chemotherapy and radiation with the understanding that the outcome was inferior to neoadjuvant chemotherapy with surgery. 3/18/2019. Started concurrent chemoradiation which was completed on May 15, 2019. He tolerated the treatment with cisplatin well. 6/17/2019 MRI of the pelvis showed that the mass was smaller than 2.9 x 2.5 x 2.7 m and had previously measured 4.1 x 3.0 x 3.9 cm. There were persistent improved enhancing densities within the fat surrounding the mass which possibly represented lymphangitic spread of carcinoma. No pathologically enlarged lymph nodes were identified. There was stable uniform mild circumferential thickening of the remainder of the urinary bladder wall. August 2019 cystoscopy did not show residual tumor. 1/23/2020. Repeat MRI showed that the diverticulum persisted and the mass within it continue to shrink measuring 2.1 x 1.2 x 1.4 cm with irregularity of the outer margin of the mass. There is no other enhancement of the fat adjacent to the mass.   There is mild generalized circumferential thickening of the wall of the urinary bladder and no pathologically enlarged lymph nodes. 3/31/2020. Patient developed recurrent hematuria. He underwent cystoscopy which showed tumor along the rim of the bladder diverticulum measuring 6 cm. Biopsy once again showed invasive high-grade urothelial carcinoma clear-cell variant. Deep smooth muscle was present in the biopsy and was involved by carcinoma. Once again it was recommended to him that he proceed with bladder surgery. The patient refused. He wanted to see if more chemotherapy would help. 4/28/2020. Chemotherapy was given with Gemzar and cisplatin. He tolerated the treatment well denied any peripheral neuropathy. His hematuria stopped after his transurethral resection. 5/5/2020. The patient had a video visit with Dr. Reyna Monroy at which time it was strongly recommended to him that he undergo cystectomy. He refused. They agreed to have cystoscopy after the second round of chemotherapy. He continued his chemotherapy which was complicated by neutropenic fever and drug-induced peripheral neuropathy. 8/25/2020. Surveillance cystoscopy showed no tumors. He had large dystrophic calcifications in the diverticulum. 9/10/2020. CT urogram showed asymmetrically thickened and enhancing bladder wall highly suspicious of neoplasia with mild enlargement of the right kidney showing marked asymmetric enhancement with a differential including pyelonephritis, infiltrating neoplasm and infarction. Dr. Reyna Monroy discussed scheduling a right ureteroscopy with possible ureteral biopsy and stent placement but the patient was adamant about not having this done    10/4/2022. The patient did not seek medical attention again until this time at which he wanted evaluation with a CAT scan of the chest abdomen pelvis.   This showed recurrent bladder cancer with marked thickening of the right bladder wall and soft tissue mass of the right ureterovesical junction with possible involvement of the distal right ureter with resultant severe right hydroureteronephrosis. There was an exophytic soft tissue portion of this mass which measured 4.2 x 3.6 cm. Pathologically enlarged lymph nodes were seen. Atherosclerosis was seen. No suspicious bony lesions were seen. CAT scan of the chest showed doubly a new 4 mm nodule along the left major fissure and decreased size of a prominent right hilar lymph node since the prior examination from 2020. There is a small pericardial effusion measuring at most 4 mm which was new. October 2022. Mr. Alvaro Mendoza had decreased appetite and had weight loss. He had abdominal pain that was not intense. He had no bleeding he said he had no infection or signs of dysuria. He had an antibiotic at home which she would take occasionally when he thought that he needed it. He denied headache and chest pain bone pain these reasons he sought medical attention. 10/31/2022. Mr. Alvaro Mendoza was seen by Dr. LOPEZ HERNANDES Montour and had TURBT. The bladder outlet was severely occlusive secondary to by lobar prostatic hypertrophy. There was evidence of a large papillary lesion suggestive of malignancy covering the right lateral wall and obscuring the right ureteral orifice. Attempts to identify the ureteral orifice failed. There were multiple bladder stones along with 2 stones embedded in a diverticulum in the right lateral wall. 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 Lymphovascular invasion was not identified. 11/9/2022. The patient had called the office on 11/1/2022 with complaints of urinary frequency and his postvoid residual was elevated at nearly 400 cc. He was offered a Gutierrez and he declined. He was advised to take Flomax. He also was taking the antibiotic that he had at home the name of which she could not recall.   He did admit to dysuria but he said that this was longstanding and he denied gross hematuria but said that his urine was brown. Once again cystectomy with diversion was recommended and the patient refused. He chose to come to see us to discuss further medical treatment. He also refused nephrostomy tubes. Comorbidities include anxiety and depression, diabetes, glaucoma, hyperlipidemia, hypothyroidism, retinal detachment of the left eye since childhood, large hiatal hernia, continued smoking with a greater than 50-pack-year history of smoking. 11/21/2022. Mr. Rosey Johnson comes into the office today to discuss further medical treatment for his invasive bladder cancer. He discussed that immunotherapy would be the next intervention that we could try as he refuses cystectomy and has progressed through cisplatin containing therapy. He is interested in this. He has had poor appetite with weight loss. He denies headache bone pain chest pain but does have abdominal pain especially in his low mid abdomen which she describes as not intense. He says that he has not been bleeding since he had his TURBT. INTERVAL NOTE    12/2022. Mr. Rosey Johnson has been evaluated with CT scans which shows that he has a nodule in his chest which is PET avid. He says that he has had nodules in his chest for many years and he is not interested in going forward with a biopsy. Reviewed his CAT scans and his PET scans with him. He expressed that the FDG avid right upper lobe nodule that was said to be highly suspicious for malignancy is not very \"hot\". I told him that the radiologist felt that this was highly suspicious for malignancy. He refused further intervention. He is interested in going forward with immunotherapy. This treatment plan has been approved. He is a candidate for immunotherapy because he has already had cisplatin and refuses cystectomy.   By this recent CAT scan and PET scan he may have metastatic disease to the lung or he may indeed have a new lung primary. He refuses to have a biopsy at this time. We reviewed some of the major toxicities of immunotherapy and how important it is to keep all the lines of communication so that we can remediate any toxicity that he may have. He verbalized understanding. He will have more chemotherapy teaching today. He has not been feeling well in the past several weeks. He had a bad cough productive of thick sputum which she describes as whitish-gray without any blood in it. Denies any fevers chills or sweats. Afebrile today. He has lost another 6 pounds since November 21. He denies any history of autoimmune diseases. It is noted that he is diabetic and has had hypothyroidism. MONITORING PARAMETERS    Physical examinations, CAT scans, PET scans, MRIs    PAST MEDICAL HISTORY  Past Medical History:   Diagnosis Date    Anxiety     Cancer ([facility] [address]     bladder chemo    Cataract     LEFT EYE    Diabetes mellitus ([facility] [address]     Glaucoma     LEFT EYE    History of hematuria     Hyperlipidemia     Hypothyroidism     Retinal detachment of left eye with multiple breaks     SINCE CHILDHOOD    Thyroid disease     Type 2 diabetes mellitus without complication (HCC)         REVIEW OF SYSTEMS  Review of Systems   Constitutional:  Positive for appetite change, fatigue and unexpected weight change. Negative for chills and diaphoresis. HENT:  Negative for dental problem, sinus pain, sore throat and trouble swallowing. Eyes:  Negative for visual disturbance. Respiratory:  Negative for cough and shortness of breath. Cardiovascular:  Negative for leg swelling. Gastrointestinal:  Negative for abdominal pain, constipation, diarrhea, nausea and vomiting. Endocrine: Negative for polyuria. Genitourinary:  Negative for dysuria, hematuria and testicular pain. Musculoskeletal:  Positive for arthralgias (generalized). Negative for myalgias. Skin:  Negative for pallor and wound. Neurological:  Negative for numbness and headaches. Hematological:  Does not bruise/bleed easily. Psychiatric/Behavioral:  Negative for self-injury, sleep disturbance and suicidal ideas. The patient is not nervous/anxious.        FAMILY HISTORY  Family History   Problem Relation Age of Onset    Other Mother         BRAIN TUMOR    Stroke Mother     Heart Disease Father     High Blood Pressure Father     Cancer Sister     Diabetes Neg Hx         SOCIAL HISTORY  Social History     Socioeconomic History    Marital status:      Spouse name: Not on file    Number of children: Not on file    Years of education: Not on file    Highest education level: Not on file   Occupational History    Not on file   Tobacco Use    Smoking status: Every Day     Packs/day: 1.00     Years: 53.00     Pack years: 53.00     Types: Cigarettes    Smokeless tobacco: Never    Tobacco comments:     12/12/22 stated depending on tumor analysis, however still not ready to quit - refused resources   Vaping Use    Vaping Use: Never used   Substance and Sexual Activity    Alcohol use: No     Comment: not in 40 years    Drug use: No    Sexual activity: Not on file   Other Topics Concern    Not on file   Social History Narrative    Not on file     Social Determinants of Health     Financial Resource Strain: Not on file   Food Insecurity: Not on file   Transportation Needs: Not on file   Physical Activity: Not on file   Stress: Not on file   Social Connections: Not on file   Intimate Partner Violence: Not on file   Housing Stability: Not on file        CURRENT MEDICATIONS  Current Outpatient Medications   Medication Sig Dispense Refill    tamsulosin (FLOMAX) 0.4 MG capsule Take 1 capsule by mouth daily 90 capsule 3    erythromycin (ROMYCIN) 5 MG/GM ophthalmic ointment apply to the left eye twice a day 1 g 1    linagliptin (TRADJENTA) 5 MG tablet Take 5 mg by mouth daily      Multiple Vitamins-Minerals ([employer] SPECTRAVITE ADULT 50+ PO) Take by mouth      Saw Palmetto 450 MG CAPS Take by mouth daily Indications: 3-4 times per week PRN       atorvastatin (LIPITOR) 40 MG tablet Take 40 mg by mouth daily      metFORMIN (GLUCOPHAGE) 500 MG tablet Take 500 mg by mouth 4 times daily       levothyroxine (SYNTHROID) 125 MCG tablet Take 125 mcg by mouth Daily      prednisoLONE acetate (PRED FORTE) 1 % ophthalmic suspension 1 drop 4 times daily      timolol (BETIMOL) 0.5 % ophthalmic solution 1 drop 2 times daily      brimonidine (ALPHAGAN) 0.2 % ophthalmic solution 1 drop 3 times daily      cyclopentolate (CYCLOGYL) 1 % ophthalmic solution 1 drop once       No current facility-administered medications for this visit. OARRS    PDMP Monitoring:    Last PDMP Skip Maldonado as Reviewed [facility]):  Review User Review Instant Review Result   Krystian Olmedo 11/27/2022 11:02 PM Reviewed PDMP [1]     Last Controlled Substance Monitoring Documentation      Flowsheet Row Admission (Discharged) from 1/30/2019 in [address] [facility] The Prescription Monitoring Report for this patient was reviewed today. filed at 01/31/2019 1255   Periodic Controlled Substance Monitoring No signs of potential drug abuse or diversion identified. filed at 01/31/2019 1255          Urine Drug Screenings (1 yr)    No resulted procedures found. Medication Contract and Consent for Opioid Use Documents Filed        No documents found                     PHYSICAL EXAM    Physical Exam  Vitals (Blood pressure today is 108/57. Pulse was 81 and regular.) and nursing note reviewed. Exam conducted with a chaperone present. Constitutional:       General: He is not in acute distress. Appearance: He is ill-appearing (chronically). He is not toxic-appearing or diaphoretic. HENT:      Head: Normocephalic and atraumatic. Comments: Temporal wasting     Nose: Nose normal.      Mouth/Throat:      Mouth: Mucous membranes are moist.      Pharynx: Oropharynx is clear. Eyes:      General: No scleral icterus.      Conjunctiva/sclera: Conjunctivae normal. Pupils: Pupils are equal, round, and reactive to light. Comments: Wears dark glasses all the time. He has whitish covering over his eye   Cardiovascular:      Rate and Rhythm: Normal rate and regular rhythm. Pulses: Normal pulses. Heart sounds: No murmur heard. Pulmonary:      Effort: No respiratory distress. Breath sounds: No stridor. No wheezing, rhonchi or rales. Comments: Smells of cigarette smoke  Chest:      Chest wall: No tenderness. Abdominal:      General: Bowel sounds are normal. There is no distension. Palpations: Abdomen is soft. There is no mass. Tenderness: There is no abdominal tenderness. There is no guarding. Hernia: No hernia is present. Comments: Wearing pants that are cinched at the waist with obvious ill fitting clothing due to significant weight loss   Musculoskeletal:         General: Normal range of motion. Cervical back: Normal range of motion. Right lower leg: No edema. Left lower leg: No edema. Lymphadenopathy:      Cervical: No cervical adenopathy. Skin:     General: Skin is warm and dry. Coloration: Skin is not jaundiced. Findings: No bruising. Neurological:      General: No focal deficit present. Mental Status: He is alert and oriented to person, place, and time. Motor: Weakness present. Gait: Gait normal.   Psychiatric:         Mood and Affect: Mood normal.         Behavior: Behavior normal.         Thought Content: Thought content normal.         Judgment: Judgment normal.        ECOG STATUS    2:  Ambulatory and capable of all self-care but unable to carry out any work activities: up and about more than 50% of waking hours    LABS/IMAGING    PET CT SKULL BASE MID THIGH RESTAGE    Result Date: 12/8/2022  1. FDG avid exophytic urinary bladder mass corresponding to known malignancy. 2. FDG avid right upper lobe lung nodule highly suspicious for malignancy.  3. Small, mildly FDG avid right parotid nodule, possibly a pleomorphic adenoma or Warthin's tumor. Metastatic disease is considered less likely but not excluded. Final report electronically signed by Dr. Charlene Kahn on 12/8/2022 1:52 PM     Lab data today shows that his BUN is 14 and his creatinine is 1.2. Calcium is normal.  EGFR is greater than 60. Total protein was 6.4 with an albumin of 3.4 sugar was 118. Liver function tests are normal.  CBC shows white count of 8.8 with hemoglobin 12.8 hematocrit 41 and a platelet count of 508,520. White blood cell differential count shows 79% polys, 14 lymphs, 5 monos, 1 eosinophil. 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 He qualifies for immunotherapy since he has muscle invasive disease possibly metastatic to the lung and is status post cisplatin treatment and refuses cystectomy. We will go forward with his teaching and treatment. 2.  Hydronephrosis. It is still possible that with his treatment and possible response he could have cystoscopy and stent placement to save any function of his right kidney. 3.  Typical comorbidities. He will continue on his current regimens for anxiety, diabetes, hyperlipidemia hypothyroidism and other problems and follow-up with his usual providers. 4.  Continued smoking. He has no desire to quit. 5.  Glaucoma. He continues on his multiple eyedrops. He knows to call in the interim if he has any change in his status, questions or concerns.         Betty Hummel MD 12/12/2022 9:08 AM

## 2022-12-12 NOTE — DISCHARGE INSTRUCTIONS
Please contact your Oncologist if you have any questions regarding the immunotherapy Keytruda that you received today. Patient instructed if experience any of the symptoms following today's immunotherapy treatment to notify MD immediately or go to emergency department. * dizziness/lightheadedness  *acute nausea/vomiting - not relieved with medication  *headache - not relieved from Tylenol/pain medication  *chest pain/pressure  *rash/itching  *shortness of breath        Drink fluids - 48oz fluids daily  Call if develop fever/ chills/ signs or symptoms of infection     Physician's instructions:  Reviewed labs and recent medical history. Discussed the findings on his PET/CT. We reviewed the small nodule finding. We will continue to monitor this. Will plan on CT scans in the future. Okay to start treatment today, 12/12/22  Anti-nausea medications sent to his pharmacy. Return to see MD in 3 weeks for his next treatment.

## 2022-12-12 NOTE — PROGRESS NOTES
Patient assessed for the following post chemotherapy:    Dizziness   No  Lightheadedness  No      Acute nausea/vomiting No  Headache   No  Chest pain/pressure  No  Rash/itching   No  Shortness of breath  No    Patient kept for 20 minutes observation post infusion chemotherapy. Patient tolerated chemotherapy treatment Keytruda without any complications. Last vital signs:   BP (!) 129/58   Pulse 77   Temp 98 °F (36.7 °C) (Oral)   Resp 16   SpO2 98%     Physician's instructions:  Reviewed labs and recent medical history. Discussed the findings on his PET/CT. We reviewed the small nodule finding. We will continue to monitor this. Will plan on CT scans in the future. Okay to start treatment today, 12/12/22  Anti-nausea medications sent to his pharmacy. Return to see MD in 3 weeks for his next treatment. Patient instructed if experience any of the above symptoms following today's infusion, he is to notify MD immediately or go to the emergency department. Discharge instructions given to patient. Verbalizes understanding. Ambulated off unit per self, with belongings.

## 2022-12-12 NOTE — PLAN OF CARE
Problem: Chronic Conditions and Co-morbidities  Goal: Patient's chronic conditions and co-morbidity symptoms are monitored and maintained or improved  Outcome: Adequate for Discharge  Flowsheets (Taken 12/12/2022 1034)  Care Plan - Patient's Chronic Conditions and Co-Morbidity Symptoms are Monitored and Maintained or Improved: Monitor and assess patient's chronic conditions and comorbid symptoms for stability, deterioration, or improvement     Problem: Intellectual/Education/Knowledge Deficit  Goal: Teaching initiated upon admission  Outcome: Adequate for Discharge  Note: Patient verbalizes understanding to verbal information given on Keytruda ,action and possible side effects. Aware to call MD if develop complications. Intervention: Verbal/written education provided  Note: Chemotherapy Teaching     What is Chemotherapy   Drug action [x]   Method of Administration [x]   Handouts given []     Side Effects  Nausea/vomiting [x]   Diarrhea [x]   Fatigue [x]   Signs / Symptoms of infection [x]   Neutropenia [x]   Thrombocytopenia [x]   Alopecia [x]   neuropathy [x]   Eldon diet &  the importance of fluids [x]       Micellaneous  Importance of nutrition [x]   Importance of oral hygiene [x]   When to call the MD [x]   Monitoring labs [x]   Use of supportive services []     Explanation of Drug Regimen / Frequency  Keytruda:  D1C1     Comments  Verbalized understanding to drug,action,side effects and when to call MD         Problem: Discharge Planning  Goal: Knowledge of discharge instructions  Description: Knowledge of discharge instructions     Outcome: Adequate for Discharge  Note: Verbalized understanding of discharge instructions, follow-up appointments, and when to call the physician. Intervention: Discharge to appropriate level of care  Note: Discuss understanding of discharge instructions,follow-up appointments, and when to call the physician.        Problem: Falls - Risk of:  Goal: Will remain free from falls  Description: Will remain free from falls  Outcome: Adequate for Discharge  Note: Patient free from falls while in [facility] Oncology. Intervention: Assess risk factors for falls  Description: Assess risk factors for falls  Note: Patient assessed for fall risk on admission to [facility]. Fall band placed on patient. Discussed the need to use the call light for assistance prior to getting up out of chair/bed. Care plan reviewed with patient. Patient verbalize understanding of the plan of care and contribute to goal setting.

## 2022-12-12 NOTE — ONCOLOGY
Chemotherapy Administration    Pre-assessment Data: Antineoplastic Agents  See toxicity flow sheet for assessment                                          [x]         Interventions:   Chemotherapy SQ injection given []   Taxol administered-VS per protocol []   Blood pressure meds held 12 hours prior to Rituxan/Ruxience []   Rituxan/Ruxience administered- VS and precautions per guidelines []   Emergency drugs available as appropriate [x]   Anaphylaxis assessment completed [x]   Pre-medications administered as ordered [x]   Blood return noted upon initiation of chemotherapy [x]   Blood return noted each 1-2ml of a vesicant medication if given IV push []   Navelbine, Vincristine and Velban given as a monitored wide open drip, blood return noted before during and after infusion.  []   Blood return noted each 2-3ml of a non-vesicant medication if given IV push []   Patient aware of potential Immunotherapy toxicities [x]   Monitor for signs / symptoms of hypersensitivity reaction [x]   Chemotherapy orders (drug/dose/rate) verified by 2 Chemo certified RNs [x]   Monitor IV site and blood return throughout the infusion of the medication [x]   Document IV site checks on the IV assessment form [x]   Document chemotherapy teaching on the Patient Education tab [x]   Document patient verbalizes understanding of medications being administered [x]   If IV infiltration, see ONS Guidelines []   Other:     Samantha Mcginnis []

## 2023-01-03 ENCOUNTER — HOSPITAL ENCOUNTER (OUTPATIENT)
Dept: INFUSION THERAPY | Age: 78
Discharge: HOME | End: 2023-01-03
Payer: MEDICARE

## 2023-01-03 ENCOUNTER — OFFICE VISIT (OUTPATIENT)
Dept: ONCOLOGY | Age: 78
End: 2023-01-03
Payer: MEDICARE

## 2023-01-03 VITALS
OXYGEN SATURATION: 97 % | WEIGHT: 144 LBS | HEART RATE: 82 BPM | RESPIRATION RATE: 16 BRPM | BODY MASS INDEX: 22.6 KG/M2 | SYSTOLIC BLOOD PRESSURE: 106 MMHG | TEMPERATURE: 97.7 F | DIASTOLIC BLOOD PRESSURE: 53 MMHG | HEIGHT: 67 IN

## 2023-01-03 VITALS
SYSTOLIC BLOOD PRESSURE: 118 MMHG | RESPIRATION RATE: 16 BRPM | HEART RATE: 82 BPM | TEMPERATURE: 97.6 F | OXYGEN SATURATION: 100 % | DIASTOLIC BLOOD PRESSURE: 55 MMHG

## 2023-01-03 DIAGNOSIS — C67.8 MALIGNANT NEOPLASM OF OVERLAPPING SITES OF BLADDER (HCC): Primary | ICD-10-CM

## 2023-01-03 DIAGNOSIS — E03.2 HYPOTHYROIDISM DUE TO MEDICATION: ICD-10-CM

## 2023-01-03 DIAGNOSIS — C67.8 MALIGNANT NEOPLASM OF OVERLAPPING SITES OF BLADDER (HCC): ICD-10-CM

## 2023-01-03 LAB
ABSOLUTE IMMATURE GRANULOCYTE: 0.03 THOU/MM3 (ref 0–0.07)
ALBUMIN SERPL-MCNC: 3 G/DL (ref 3.5–5.1)
ALP BLD-CCNC: 80 U/L (ref 38–126)
ALT SERPL-CCNC: 8 U/L (ref 11–66)
AST SERPL-CCNC: 13 U/L (ref 5–40)
BASINOPHIL, AUTOMATED: 0 % (ref 0–3)
BASOPHILS ABSOLUTE: 0 THOU/MM3 (ref 0–0.1)
BILIRUB SERPL-MCNC: 0.4 MG/DL (ref 0.3–1.2)
BILIRUBIN DIRECT: < 0.2 MG/DL (ref 0–0.3)
BUN, WHOLE BLOOD: 30 MG/DL (ref 8–26)
CALCIUM SERPL-MCNC: 10.2 MG/DL (ref 8.5–10.5)
CHLORIDE, WHOLE BLOOD: 100 MEQ/L (ref 98–109)
CORTISOL: 23.86 UG/DL
CREATININE URINE: 114.8 MG/DL
CREATININE, WHOLE BLOOD: 1.2 MG/DL (ref 0.5–1.2)
EOSINOPHILS ABSOLUTE: 0.2 THOU/MM3 (ref 0–0.4)
EOSINOPHILS RELATIVE PERCENT: 2 % (ref 0–4)
GFR SERPL CREATININE-BSD FRML MDRD: > 60 ML/MIN/1.73M2
GLUCOSE, WHOLE BLOOD: 134 MG/DL (ref 70–108)
HCT VFR BLD CALC: 34.7 % (ref 42–52)
HEMOGLOBIN: 11.1 GM/DL (ref 14–18)
IMMATURE GRANULOCYTES: 0 %
IONIZED CALCIUM, WHOLE BLOOD: 1.44 MMOL/L (ref 1.12–1.32)
LYMPHOCYTES # BLD: 10 % (ref 15–47)
LYMPHOCYTES ABSOLUTE: 1.1 THOU/MM3 (ref 1–4.8)
MCH RBC QN AUTO: 28.8 PG (ref 26–33)
MCHC RBC AUTO-ENTMCNC: 32 GM/DL (ref 32.2–35.5)
MCV RBC AUTO: 90 FL (ref 80–94)
MONOCYTES ABSOLUTE: 0.5 THOU/MM3 (ref 0.4–1.3)
MONOCYTES: 5 % (ref 0–12)
PDW BLD-RTO: 15.3 % (ref 11.5–14.5)
PLATELET # BLD: 234 THOU/MM3 (ref 130–400)
PMV BLD AUTO: 9.1 FL (ref 9.4–12.4)
POTASSIUM, WHOLE BLOOD: 4.8 MEQ/L (ref 3.5–4.9)
PROT/CREAT RATIO, UR: 1.09
PROTEIN, URINE: 124.6 MG/DL
RBC # BLD: 3.85 MILL/MM3 (ref 4.7–6.1)
SEG NEUTROPHILS: 83 % (ref 43–75)
SEGMENTED NEUTROPHILS ABSOLUTE COUNT: 9 THOU/MM3 (ref 1.8–7.7)
SODIUM, WHOLE BLOOD: 136 MEQ/L (ref 138–146)
TOTAL CO2, WHOLE BLOOD: 26 MEQ/L (ref 23–33)
TOTAL PROTEIN: 6.1 G/DL (ref 6.1–8)
TSH SERPL DL<=0.05 MIU/L-ACNC: 2.45 UIU/ML (ref 0.4–4.2)
WBC # BLD: 10.9 THOU/MM3 (ref 4.8–10.8)

## 2023-01-03 PROCEDURE — 96361 HYDRATE IV INFUSION ADD-ON: CPT

## 2023-01-03 PROCEDURE — 84156 ASSAY OF PROTEIN URINE: CPT

## 2023-01-03 PROCEDURE — 2580000003 HC RX 258: Performed by: INTERNAL MEDICINE

## 2023-01-03 PROCEDURE — 1123F ACP DISCUSS/DSCN MKR DOCD: CPT | Performed by: INTERNAL MEDICINE

## 2023-01-03 PROCEDURE — 99211 OFF/OP EST MAY X REQ PHY/QHP: CPT

## 2023-01-03 PROCEDURE — 6360000002 HC RX W HCPCS: Performed by: INTERNAL MEDICINE

## 2023-01-03 PROCEDURE — 36415 COLL VENOUS BLD VENIPUNCTURE: CPT

## 2023-01-03 PROCEDURE — 99214 OFFICE O/P EST MOD 30 MIN: CPT | Performed by: INTERNAL MEDICINE

## 2023-01-03 PROCEDURE — 80076 HEPATIC FUNCTION PANEL: CPT

## 2023-01-03 PROCEDURE — 82570 ASSAY OF URINE CREATININE: CPT

## 2023-01-03 PROCEDURE — 96413 CHEMO IV INFUSION 1 HR: CPT

## 2023-01-03 PROCEDURE — 82533 TOTAL CORTISOL: CPT

## 2023-01-03 PROCEDURE — 80047 BASIC METABLC PNL IONIZED CA: CPT

## 2023-01-03 PROCEDURE — G8484 FLU IMMUNIZE NO ADMIN: HCPCS | Performed by: INTERNAL MEDICINE

## 2023-01-03 PROCEDURE — G8420 CALC BMI NORM PARAMETERS: HCPCS | Performed by: INTERNAL MEDICINE

## 2023-01-03 PROCEDURE — 84443 ASSAY THYROID STIM HORMONE: CPT

## 2023-01-03 PROCEDURE — G8427 DOCREV CUR MEDS BY ELIG CLIN: HCPCS | Performed by: INTERNAL MEDICINE

## 2023-01-03 PROCEDURE — 82310 ASSAY OF CALCIUM: CPT

## 2023-01-03 PROCEDURE — 85025 COMPLETE CBC W/AUTO DIFF WBC: CPT

## 2023-01-03 PROCEDURE — 4004F PT TOBACCO SCREEN RCVD TLK: CPT | Performed by: INTERNAL MEDICINE

## 2023-01-03 RX ORDER — SODIUM CHLORIDE 9 MG/ML
INJECTION, SOLUTION INTRAVENOUS CONTINUOUS
Status: CANCELLED | OUTPATIENT
Start: 2023-01-03

## 2023-01-03 RX ORDER — SODIUM CHLORIDE 9 MG/ML
5-250 INJECTION, SOLUTION INTRAVENOUS PRN
Status: CANCELLED | OUTPATIENT
Start: 2023-01-03

## 2023-01-03 RX ORDER — SODIUM CHLORIDE 9 MG/ML
5-40 INJECTION INTRAVENOUS PRN
Status: CANCELLED | OUTPATIENT
Start: 2023-01-03

## 2023-01-03 RX ORDER — SODIUM CHLORIDE 0.9 % (FLUSH) 0.9 %
5-40 SYRINGE (ML) INJECTION PRN
Status: CANCELLED | OUTPATIENT
Start: 2023-01-03

## 2023-01-03 RX ORDER — FAMOTIDINE 10 MG/ML
20 INJECTION, SOLUTION INTRAVENOUS
Status: CANCELLED | OUTPATIENT
Start: 2023-01-03

## 2023-01-03 RX ORDER — MEPERIDINE HYDROCHLORIDE 50 MG/ML
12.5 INJECTION INTRAMUSCULAR; INTRAVENOUS; SUBCUTANEOUS PRN
Status: CANCELLED | OUTPATIENT
Start: 2023-01-03

## 2023-01-03 RX ORDER — SODIUM CHLORIDE 0.9 % (FLUSH) 0.9 %
5-40 SYRINGE (ML) INJECTION PRN
OUTPATIENT
Start: 2023-01-03

## 2023-01-03 RX ORDER — ALBUTEROL SULFATE 90 UG/1
4 AEROSOL, METERED RESPIRATORY (INHALATION) PRN
Status: CANCELLED | OUTPATIENT
Start: 2023-01-03

## 2023-01-03 RX ORDER — SODIUM CHLORIDE 9 MG/ML
5-250 INJECTION, SOLUTION INTRAVENOUS PRN
Status: DISCONTINUED | OUTPATIENT
Start: 2023-01-03 | End: 2023-01-04 | Stop reason: HOSPADM

## 2023-01-03 RX ORDER — ACETAMINOPHEN 325 MG/1
650 TABLET ORAL
Status: CANCELLED | OUTPATIENT
Start: 2023-01-03

## 2023-01-03 RX ORDER — ONDANSETRON 2 MG/ML
8 INJECTION INTRAMUSCULAR; INTRAVENOUS
Status: CANCELLED | OUTPATIENT
Start: 2023-01-03

## 2023-01-03 RX ORDER — HEPARIN SODIUM (PORCINE) LOCK FLUSH IV SOLN 100 UNIT/ML 100 UNIT/ML
500 SOLUTION INTRAVENOUS PRN
Status: CANCELLED | OUTPATIENT
Start: 2023-01-03

## 2023-01-03 RX ORDER — SODIUM CHLORIDE 9 MG/ML
5-250 INJECTION, SOLUTION INTRAVENOUS PRN
OUTPATIENT
Start: 2023-01-03

## 2023-01-03 RX ORDER — 0.9 % SODIUM CHLORIDE 0.9 %
500 INTRAVENOUS SOLUTION INTRAVENOUS ONCE
Status: CANCELLED | OUTPATIENT
Start: 2023-01-03 | End: 2023-01-03

## 2023-01-03 RX ORDER — DIPHENHYDRAMINE HYDROCHLORIDE 50 MG/ML
50 INJECTION INTRAMUSCULAR; INTRAVENOUS
Status: CANCELLED | OUTPATIENT
Start: 2023-01-03

## 2023-01-03 RX ORDER — 0.9 % SODIUM CHLORIDE 0.9 %
500 INTRAVENOUS SOLUTION INTRAVENOUS ONCE
Status: COMPLETED | OUTPATIENT
Start: 2023-01-03 | End: 2023-01-03

## 2023-01-03 RX ORDER — HEPARIN SODIUM (PORCINE) LOCK FLUSH IV SOLN 100 UNIT/ML 100 UNIT/ML
500 SOLUTION INTRAVENOUS PRN
OUTPATIENT
Start: 2023-01-03

## 2023-01-03 RX ORDER — EPINEPHRINE 1 MG/ML
0.3 INJECTION, SOLUTION, CONCENTRATE INTRAVENOUS PRN
Status: CANCELLED | OUTPATIENT
Start: 2023-01-03

## 2023-01-03 RX ADMIN — SODIUM CHLORIDE 500 ML: 9 INJECTION, SOLUTION INTRAVENOUS at 11:58

## 2023-01-03 RX ADMIN — SODIUM CHLORIDE 20 ML/HR: 9 INJECTION, SOLUTION INTRAVENOUS at 11:58

## 2023-01-03 RX ADMIN — SODIUM CHLORIDE 200 MG: 9 INJECTION, SOLUTION INTRAVENOUS at 14:01

## 2023-01-03 NOTE — PLAN OF CARE
Problem: Intellectual/Education/Knowledge Deficit  Goal: Teaching initiated upon admission  Outcome: Adequate for Discharge  Note: Patient verbalizes understanding to verbal information  given on keytruda,action and possible side effects. Aware to call MD if develop complications. Problem: Discharge Planning  Goal: Knowledge of discharge instructions  Description: Knowledge of discharge instructions     Outcome: Adequate for Discharge  Note: Verbalize understanding of discharge instructions, follow up appointments, and when to call Physician. Intervention: Discharge to appropriate level of care  Note: Discuss understanding of discharge instructions, follow up appointments and when to call Physician. Problem: Falls - Risk of:  Goal: Will remain free from falls  Description: Will remain free from falls  Outcome: Adequate for Discharge  Note: No falls occurred with visit today. Intervention: Assess risk factors for falls  Note: Discussed the need to use the call light for assistance when getting up to ambulate. Care plan reviewed with patient. Patient verbalizes understanding of the plan of care and contributes to goal setting.

## 2023-01-03 NOTE — PROGRESS NOTES
[place] [id number] [facility] [place] [id number]  Dept: [phone]  Loc: [phone]     Jennifer Walsh  1945   No ref. provider found   Artitricia SongWILLIAN ruiz - GE       Jennifer Walsh is a 68 y.o.  male with bladder cancer    CHIEF COMPLAINT  Chief Complaint   Patient presents with    Follow-up     Malignant neoplasm of overlapping sites of bladder           HISTORY OF PRESENT ILLNESS    December 2018. Patient developed gross hematuria and flank pain. He had an emergency department encounter for hematuria that had been ongoing for 3 months. He had already been seen by urology who recommended cystoscopy and CAT scan but the patient did not want to do this testing because it was too invasive and he wanted \"minor fixes\". He believes that he had a yeast infection that requires treatment. He wants treatment with nystatin and refuses to return to the urologist.  He understands that his symptoms may relate to bladder cancer or kidney cancer. He has had 2 pound increase in weight. He also had diarrhea. He had been a smoker of 2 packs of cigarettes a day. The patient's urinalysis showed no yeast he had gross hematuria with pyuria and bacteria. He was told to go directly to his urologist.    12/20/2018. Seen by Dr. Adan Sherwood CT urogram had been performed which showed that the right ureter was displaced posteriorly by bladder diverticulum can staining a solid enhancing soft tissue mass in addition to a large calcification measuring 2.3 cm. The right lateral and posterior bladder wall was irregularly thickened highly concerning for neoplasm. There is also a calculus in the posterior dependent portion of the bladder. The bladder wall was mildly irregular. The prostate gland was normal in size and contain calcifications. There were no suspicious bone lesions.   Cystoscopy was performed which confirmed that there was a diverticulum which had a bladder stone and a bladder tumor inside of it and another stone in the bladder. We recommended TURBT and cystoscopy litholapaxy. 1/4/2019. CT scan of the chest confirmed no mediastinal hilar adenopathy or pulmonary lesions except for an 5 mm noncalcified nodule in the upper aspect of the right middle lobe. There are no suspicious bony lesions. 1/30/2019. The patient had transurethral resection of bladder tumor and cystolitholapaxy. Pathology showed invasive high-grade urothelial carcinoma, clear-cell variant. Deep smooth muscle was present and was involved by carcinoma flat urothelial carcinoma in situ is also seen. Lymphovascular invasion was not seen. 2/19/2019. Referred to Dr. Aneta Marshall neoadjuvant chemotherapy before port bladder resection. Patient was absolutely opposed to having bladder surgery. He agreed to concurrent chemotherapy and radiation with the understanding that the outcome was inferior to neoadjuvant chemotherapy with surgery. 3/18/2019. Started concurrent chemoradiation which was completed on May 15, 2019. He tolerated the treatment with cisplatin well. 6/17/2019 MRI of the pelvis showed that the mass was smaller than 2.9 x 2.5 x 2.7 m and had previously measured 4.1 x 3.0 x 3.9 cm. There were persistent improved enhancing densities within the fat surrounding the mass which possibly represented lymphangitic spread of carcinoma. No pathologically enlarged lymph nodes were identified. There was stable uniform mild circumferential thickening of the remainder of the urinary bladder wall. August 2019 cystoscopy did not show residual tumor. 1/23/2020. Repeat MRI showed that the diverticulum persisted and the mass within it continue to shrink measuring 2.1 x 1.2 x 1.4 cm with irregularity of the outer margin of the mass. There is no other enhancement of the fat adjacent to the mass.   There is mild generalized circumferential thickening of the wall of the urinary bladder and no pathologically enlarged lymph nodes. 3/31/2020. Patient developed recurrent hematuria. He underwent cystoscopy which showed tumor along the rim of the bladder diverticulum measuring 6 cm. Biopsy once again showed invasive high-grade urothelial carcinoma clear-cell variant. Deep smooth muscle was present in the biopsy and was involved by carcinoma. Once again it was recommended to him that he proceed with bladder surgery. The patient refused. He wanted to see if more chemotherapy would help. 4/28/2020. Chemotherapy was given with Gemzar and cisplatin. He tolerated the treatment well denied any peripheral neuropathy. His hematuria stopped after his transurethral resection. 5/5/2020. The patient had a video visit with Dr. Emerson Vallejo at which time it was strongly recommended to him that he undergo cystectomy. He refused. They agreed to have cystoscopy after the second round of chemotherapy. He continued his chemotherapy which was complicated by neutropenic fever and drug-induced peripheral neuropathy. 8/25/2020. Surveillance cystoscopy showed no tumors. He had large dystrophic calcifications in the diverticulum. 9/10/2020. CT urogram showed asymmetrically thickened and enhancing bladder wall highly suspicious of neoplasia with mild enlargement of the right kidney showing marked asymmetric enhancement with a differential including pyelonephritis, infiltrating neoplasm and infarction. Dr. Emerson Vallejo discussed scheduling a right ureteroscopy with possible ureteral biopsy and stent placement but the patient was adamant about not having this done    10/4/2022. The patient did not seek medical attention again until this time at which he wanted evaluation with a CAT scan of the chest abdomen pelvis.   This showed recurrent bladder cancer with marked thickening of the right bladder wall and soft tissue mass of the right ureterovesical junction with possible involvement of the distal right ureter with resultant severe right hydroureteronephrosis. There was an exophytic soft tissue portion of this mass which measured 4.2 x 3.6 cm. Pathologically enlarged lymph nodes were seen. Atherosclerosis was seen. No suspicious bony lesions were seen. CAT scan of the chest showed doubly a new 4 mm nodule along the left major fissure and decreased size of a prominent right hilar lymph node since the prior examination from 2020. There is a small pericardial effusion measuring at most 4 mm which was new. October 2022. Mr. Miguel Messina had decreased appetite and had weight loss. He had abdominal pain that was not intense. He had no bleeding he said he had no infection or signs of dysuria. He had an antibiotic at home which she would take occasionally when he thought that he needed it. He denied headache and chest pain bone pain these reasons he sought medical attention. 10/31/2022. Mr. Miguel Messina was seen by Dr. MARROQUIN [facility] and had TURBT. The bladder outlet was severely occlusive secondary to by lobar prostatic hypertrophy. There was evidence of a large papillary lesion suggestive of malignancy covering the right lateral wall and obscuring the right ureteral orifice. Attempts to identify the ureteral orifice failed. There were multiple bladder stones along with 2 stones embedded in a diverticulum in the right lateral wall. 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 Lymphovascular invasion was not identified. 11/9/2022. The patient had called the office on 11/1/2022 with complaints of urinary frequency and his postvoid residual was elevated at nearly 400 cc. He was offered a Gutierrez and he declined. He was advised to take Flomax. He also was taking the antibiotic that he had at home the name of which she could not recall.   He did admit to dysuria but he said that this was longstanding and he denied gross hematuria but said that his urine was brown. Once again cystectomy with diversion was recommended and the patient refused. He chose to come to see us to discuss further medical treatment. He also refused nephrostomy tubes. Comorbidities include anxiety and depression, diabetes, glaucoma, hyperlipidemia, hypothyroidism, retinal detachment of the left eye since childhood, large hiatal hernia, continued smoking with a greater than 50-pack-year history of smoking. 11/21/2022. Mr. Sulaiman Worrell comes into the office today to discuss further medical treatment for his invasive bladder cancer. He discussed that immunotherapy would be the next intervention that we could try as he refuses cystectomy and has progressed through cisplatin containing therapy. He is interested in this. He has had poor appetite with weight loss. He denies headache bone pain chest pain but does have abdominal pain especially in his low mid abdomen which she describes as not intense. He says that he has not been bleeding since he had his TURBT.     12/2022. Mr. Sulaiman Worrell has been evaluated with CT scans which shows that he has a nodule in his chest which is PET avid. He says that he has had nodules in his chest for many years and he is not interested in going forward with a biopsy. Reviewed his CAT scans and his PET scans with him. He expressed that the FDG avid right upper lobe nodule that was said to be highly suspicious for malignancy is not very \"hot\". I told him that the radiologist felt that this was highly suspicious for malignancy. He refused further intervention. He is interested in going forward with immunotherapy. This treatment plan has been approved. He is a candidate for immunotherapy because he has already had cisplatin and refuses cystectomy. By this recent CAT scan and PET scan he may have metastatic disease to the lung or he may indeed have a new lung primary.   He refuses to have a biopsy at this time. We reviewed some of the major toxicities of immunotherapy and how important it is to keep all the lines of communication so that we can remediate any toxicity that he may have. He verbalized understanding. He will have more chemotherapy teaching today. He has not been feeling well in the past several weeks. He had a bad cough productive of thick sputum which she describes as whitish-gray without any blood in it. Denies any fevers chills or sweats. Afebrile today. He has lost another 6 pounds since November 21. He denies any history of autoimmune diseases. It is noted that he is diabetic and has had hypothyroidism. INTERVAL NOTE    1/3/2023. Mr. Briseyda Contreras returns to the office to continue his treatment with pembrolizumab for his bladder cancer. He has refused cystectomy. He has lost 2 pounds since last time I saw him 3 weeks ago. Noted brown urine which probably means that there is old blood in his urine. He has had no other bleeding. He is not very hungry. We talked about ways to summarize the protein in his diet and he knows that it is his job to eat despite the fact that he does not really want to. He has had pain in his right knee since before he started on pembrolizumab he takes Tylenol for it and there is an arthritis cream that he rubs into his right knee as well he says that this helps for several hours. He has had no problems with constipation diarrhea or rashes. He lives alone. I encouraged him to set up a  system with his nephew where he calls him to check in as a \"well call\" so that if he does not call, his nephew will check on him.     MONITORING PARAMETERS    Physical examinations, CAT scans PET scans and MRIs    PAST MEDICAL HISTORY  Past Medical History:   Diagnosis Date    Anxiety     Cancer ([address]     bladder chemo    Cataract     LEFT EYE    Diabetes mellitus ([facility] [address]     Glaucoma     LEFT EYE    History of hematuria     Hyperlipidemia     Hypothyroidism     Retinal detachment of left eye with multiple breaks     SINCE CHILDHOOD    Thyroid disease     Type 2 diabetes mellitus without complication (HCC)         REVIEW OF SYSTEMS  Review of Systems   Constitutional:  Positive for appetite change, fatigue and unexpected weight change. Negative for chills and diaphoresis. HENT:  Negative for dental problem, sinus pain, sore throat and trouble swallowing. Eyes:  Negative for visual disturbance. Respiratory:  Negative for cough and shortness of breath. Cardiovascular:  Negative for leg swelling. Gastrointestinal:  Negative for abdominal pain, constipation, diarrhea, nausea and vomiting. Endocrine: Negative for polyuria. Genitourinary:  Negative for dysuria, hematuria and testicular pain. Musculoskeletal:  Positive for arthralgias (generalized). Negative for myalgias. Skin:  Negative for pallor and wound. Neurological:  Negative for numbness and headaches. Hematological:  Does not bruise/bleed easily. Psychiatric/Behavioral:  Negative for self-injury, sleep disturbance and suicidal ideas. The patient is not nervous/anxious. FAMILY HISTORY  Family History   Problem Relation Age of Onset    Other Mother         BRAIN TUMOR    Stroke Mother     Heart Disease Father     High Blood Pressure Father     Cancer Sister     Diabetes Neg Hx         SOCIAL HISTORY  Social History     Socioeconomic History    Marital status:      Spouse name: Not on file    Number of children: Not on file    Years of education: Not on file    Highest education level: Not on file   Occupational History    Not on file   Tobacco Use    Smoking status: Every Day     Packs/day: 1.00     Years: 53.00     Pack years: 53.00     Types: Cigarettes    Smokeless tobacco: Never    Tobacco comments:     12/12/22 stated depending on tumor analysis, however still not ready to quit - refused resources   Vaping Use    Vaping Use: Never used   Substance and Sexual Activity    Alcohol use:  No Comment: not in 40 years    Drug use: No    Sexual activity: Not on file   Other Topics Concern    Not on file   Social History Narrative    Not on file     Social Determinants of Health     Financial Resource Strain: Not on file   Food Insecurity: Not on file   Transportation Needs: Not on file   Physical Activity: Not on file   Stress: Not on file   Social Connections: Not on file   Intimate Partner Violence: Not on file   Housing Stability: Not on file        CURRENT MEDICATIONS  Current Outpatient Medications   Medication Sig Dispense Refill    ondansetron (ZOFRAN) 4 MG tablet Take 1 tablet by mouth every 8 hours as needed for Nausea or Vomiting 20 tablet 1    prochlorperazine (COMPAZINE) 10 MG tablet Take 1 tablet by mouth every 6 hours as needed (for nausea) 60 tablet 1    tamsulosin (FLOMAX) 0.4 MG capsule Take 1 capsule by mouth daily 90 capsule 3    erythromycin (ROMYCIN) 5 MG/GM ophthalmic ointment apply to the left eye twice a day 1 g 1    linagliptin (TRADJENTA) 5 MG tablet Take 5 mg by mouth daily      Multiple Vitamins-Minerals ([employer] SPECTRAVITE ADULT 50+ PO) Take by mouth      Saw Palmetto 450 MG CAPS Take by mouth daily Indications: 3-4 times per week PRN       atorvastatin (LIPITOR) 40 MG tablet Take 40 mg by mouth daily      metFORMIN (GLUCOPHAGE) 500 MG tablet Take 500 mg by mouth 4 times daily       levothyroxine (SYNTHROID) 125 MCG tablet Take 125 mcg by mouth Daily      prednisoLONE acetate (PRED FORTE) 1 % ophthalmic suspension 1 drop 4 times daily      timolol (BETIMOL) 0.5 % ophthalmic solution 1 drop 2 times daily      brimonidine (ALPHAGAN) 0.2 % ophthalmic solution 1 drop 3 times daily      cyclopentolate (CYCLOGYL) 1 % ophthalmic solution 1 drop once       No current facility-administered medications for this visit.         OARRS    PDMP Monitoring:    Last PDMP Major Revering as Reviewed [facility]):  Review User Review Instant Review Result   Dunia Boss 11/27/2022 11:02 PM Reviewed PDMP [1]     Last Controlled Substance Monitoring Documentation      Flowsheet Row Admission (Discharged) from 1/30/2019 in [address] The Prescription Monitoring Report for this patient was reviewed today. filed at 01/31/2019 1255   Periodic Controlled Substance Monitoring No signs of potential drug abuse or diversion identified. filed at 01/31/2019 1255          Urine Drug Screenings (1 yr)    No resulted procedures found. Medication Contract and Consent for Opioid Use Documents Filed        No documents found                     PHYSICAL EXAM    Physical Exam  Vitals (Blood pressure today is 106/53.) and nursing note reviewed. Exam conducted with a chaperone present. Constitutional:       General: He is not in acute distress. Appearance: He is ill-appearing. He is not toxic-appearing or diaphoretic. Comments: Mr. Anish Kim is a chronically ill-appearing  male who is wearing pants that are full sizes too big for him cinched together with a belt. 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 He smells of smoke. He came in unaccompanied. HENT:      Head: Normocephalic and atraumatic. Mouth/Throat:      Mouth: Mucous membranes are moist.      Pharynx: Oropharynx is clear. No oropharyngeal exudate or posterior oropharyngeal erythema. Eyes:      General: No scleral icterus. 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 No respiratory distress. Breath sounds: Normal breath sounds. No stridor. No wheezing, rhonchi or rales. Abdominal:      General: There is no distension. Palpations: Abdomen is soft. Tenderness: There is no abdominal tenderness. There is no guarding or rebound. Musculoskeletal:      Right lower leg: No edema. Left lower leg: No edema.    Lymphadenopathy:      Upper Body:      Right upper body: Supraclavicular adenopathy present. Left upper body: Supraclavicular adenopathy (small \"grainy areas on both sides worse on left than right.) present. Skin:     General: Skin is warm and dry. Coloration: Skin is pale. Skin is not jaundiced. Neurological:      Mental Status: He is alert. Comments: Blind in left eye   Psychiatric:         Mood and Affect: Mood normal.         Behavior: Behavior normal.         Thought Content: Thought content normal.         Judgment: Judgment normal.        ECOG STATUS    2:  Ambulatory and capable of all self-care but unable to carry out any work activities: up and about more than 50% of waking hours    LABS/IMAGING    PET CT SKULL BASE MID THIGH RESTAGE    Result Date: 12/8/2022  1. FDG avid exophytic urinary bladder mass corresponding to known malignancy. 2. FDG avid right upper lobe lung nodule highly suspicious for malignancy. 3. Small, mildly FDG avid right parotid nodule, possibly a pleomorphic adenoma or Warthin's tumor. Metastatic disease is considered less likely but not excluded. Final report electronically signed by Dr. Richard Marvin on 12/8/2022 1:52 PM     Laboratory data drawn today shows a sodium of 136, BUN of 30 creatinine 1.2, elevation of his calcium at 1.44 which on repeat was normal, sugar of 134. Total protein was 6.1 with an albumin of 3.0. Liver function tests are normal.  Random cortisol was normal.  TSH was normal.  CBC shows white count of 10.9 with hemoglobin 11.1 hematocrit 34.7 and a platelet count of 108,317. White blood cell differential count showed 83% polys, 10 lymphs, 5 monos, 2 eosinophils. 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 ASSESSMENT and PLAN    1. 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 He tolerated his first infusion well. He will go ahead with his second infusion.   We will continue to monitor his various parameters closely as he continues to lose weight. 2.  Social barriers to care. He lives alone. Strongly encouraged him to have a system with his family so that we will be checking on him since he lives alone. He verbalized understanding and said he would do so.    3.  Worsening anemia. We will continue to monitor this. He has brown urine which is probably due to old blood. Has no other bleeding ongoing. 4.  COPD and continued smoking. I strongly encouraged him to be careful so that he does not have accidents with his cigarettes. He has no desire to quit smoking. 5.  Hydronephrosis. We will monitor this. 6.  Multiple comorbidities. We will continue on his current regimen for anxiety, diabetes, hyperlipidemia, hypothyroidism, glaucoma and other problems. He will continue follow-up with his usual providers.     He will let us know if he has any change in his status, questions or concerns    Daphnie Figueredo MD 1/3/2023 11:13 AM

## 2023-01-03 NOTE — PATIENT INSTRUCTIONS
Reviewed labs and recent medical history. Discussed his elevated Ionized calcium. Will recheck a calcium level. (Check orthostatic Blood pressures)  Okay for treatment today, 1/ 3/23. Added 500 ml of NS (Hydration) to be given today with treatment. Encouraged to drink more glucerna. Discussed with patient about contacting his nephew to check in with on a regular basis. Encouraged support stockings to help keep up blood pressure. Return to see MD in 3 weeks.

## 2023-01-03 NOTE — PROGRESS NOTES
Patient assessed for the following post chemotherapy:    Dizziness   No  Lightheadedness  No      Acute nausea/vomiting No  Headache   No  Chest pain/pressure  No  Rash/itching   No  Shortness of breath  No    Patient kept for 20 minutes observation post infusion chemotherapy. Patient tolerated chemotherapy treatment keytruda without any complications. Last vital signs:   BP (!) 118/55   Pulse 82   Temp 97.6 °F (36.4 °C) (Oral)   Resp 16   SpO2 100%       Patient instructed if experience any of the above symptoms following today's infusion,he/she is to notify MD immediately or go to the emergency department. Discharge instructions given to patient. Verbalizes understanding. Ambulated off unit per self  with belongings.

## 2023-01-03 NOTE — DISCHARGE INSTRUCTIONS
Please contact your Oncologist if you have any questions regarding the fluid bolus/keytruda that you received today. You are instructed to call the office or go to the Emergency Dept. If you experience any of the following symptoms:    Dizziness/lightheadedness   Acute nausea or vomiting-not relieved by medications  Headaches-not relieved by medications  New chest pain or pressure  New rash /itching  New shortness of breath  Fever,chills or signs or symptoms of infection    Make sure you are drinking 48 to 64 ounces of water daily-if you are unable to drink fluids let us know right away.

## 2023-01-24 ENCOUNTER — HOSPITAL ENCOUNTER (OUTPATIENT)
Dept: INFUSION THERAPY | Age: 78
Discharge: HOME | End: 2023-01-24
Payer: MEDICARE

## 2023-01-24 ENCOUNTER — OFFICE VISIT (OUTPATIENT)
Dept: ONCOLOGY | Age: 78
End: 2023-01-24
Payer: MEDICARE

## 2023-01-24 VITALS
WEIGHT: 137.8 LBS | DIASTOLIC BLOOD PRESSURE: 78 MMHG | OXYGEN SATURATION: 98 % | TEMPERATURE: 97.7 F | HEART RATE: 95 BPM | RESPIRATION RATE: 16 BRPM | HEIGHT: 67 IN | BODY MASS INDEX: 21.63 KG/M2 | SYSTOLIC BLOOD PRESSURE: 157 MMHG

## 2023-01-24 VITALS
HEART RATE: 95 BPM | DIASTOLIC BLOOD PRESSURE: 78 MMHG | TEMPERATURE: 97.7 F | SYSTOLIC BLOOD PRESSURE: 157 MMHG | RESPIRATION RATE: 16 BRPM

## 2023-01-24 DIAGNOSIS — C67.8 MALIGNANT NEOPLASM OF OVERLAPPING SITES OF BLADDER (HCC): Primary | ICD-10-CM

## 2023-01-24 DIAGNOSIS — C67.8 MALIGNANT NEOPLASM OF OVERLAPPING SITES OF BLADDER (HCC): ICD-10-CM

## 2023-01-24 LAB
BACTERIA URNS QL MICRO: ABNORMAL /HPF
BILIRUB UR QL STRIP.AUTO: ABNORMAL
CASTS #/AREA URNS LPF: ABNORMAL /LPF
CASTS 2: ABNORMAL /LPF
CHARACTER UR: ABNORMAL
COLOR: ABNORMAL
CRYSTALS URNS MICRO: ABNORMAL
EPITHELIAL CELLS, UA: ABNORMAL /HPF
GLUCOSE UR QL STRIP.AUTO: NEGATIVE MG/DL
HGB UR QL STRIP.AUTO: ABNORMAL
ICTOTEST: NEGATIVE
KETONES UR QL STRIP.AUTO: ABNORMAL
MISCELLANEOUS 2: ABNORMAL
NITRITE UR QL STRIP: NEGATIVE
PH UR STRIP.AUTO: 6.5 [PH] (ref 5–9)
PROT UR STRIP.AUTO-MCNC: 300 MG/DL
RBC URINE: ABNORMAL /HPF
RENAL EPI CELLS #/AREA URNS HPF: ABNORMAL /[HPF]
SP GR UR REFRACT.AUTO: 1.02 (ref 1–1.03)
UROBILINOGEN, URINE: 1 EU/DL (ref 0–1)
WBC #/AREA URNS HPF: > 200 /HPF
WBC #/AREA URNS HPF: ABNORMAL /[HPF]
YEAST LIKE FUNGI URNS QL MICRO: ABNORMAL

## 2023-01-24 PROCEDURE — 99211 OFF/OP EST MAY X REQ PHY/QHP: CPT

## 2023-01-24 PROCEDURE — 99214 OFFICE O/P EST MOD 30 MIN: CPT | Performed by: INTERNAL MEDICINE

## 2023-01-24 PROCEDURE — G8427 DOCREV CUR MEDS BY ELIG CLIN: HCPCS | Performed by: INTERNAL MEDICINE

## 2023-01-24 PROCEDURE — G8420 CALC BMI NORM PARAMETERS: HCPCS | Performed by: INTERNAL MEDICINE

## 2023-01-24 PROCEDURE — 1123F ACP DISCUSS/DSCN MKR DOCD: CPT | Performed by: INTERNAL MEDICINE

## 2023-01-24 PROCEDURE — G8484 FLU IMMUNIZE NO ADMIN: HCPCS | Performed by: INTERNAL MEDICINE

## 2023-01-24 PROCEDURE — 4004F PT TOBACCO SCREEN RCVD TLK: CPT | Performed by: INTERNAL MEDICINE

## 2023-01-24 PROCEDURE — 87086 URINE CULTURE/COLONY COUNT: CPT

## 2023-01-24 PROCEDURE — 81001 URINALYSIS AUTO W/SCOPE: CPT

## 2023-01-24 PROCEDURE — 81003 URINALYSIS AUTO W/O SCOPE: CPT | Performed by: INTERNAL MEDICINE

## 2023-01-24 RX ORDER — AMOXICILLIN AND CLAVULANATE POTASSIUM 875; 125 MG/1; MG/1
1 TABLET, FILM COATED ORAL 2 TIMES DAILY
Qty: 20 TABLET | Refills: 0 | Status: SHIPPED | OUTPATIENT
Start: 2023-01-24 | End: 2023-02-03

## 2023-01-24 NOTE — PATIENT INSTRUCTIONS
Reviewed symptoms and recent medical history. Discussed the drainage from his eye and his sore mouth. 3. Will collect a throat culture and urinalysis and culture. Orders placed. 4. Script for Augmentin sent to his pharmacy.   5. Return to see MD next Wednesday 2/1/23

## 2023-01-26 LAB
BACTERIA UR CULT: ABNORMAL
ORGANISM: ABNORMAL

## 2023-01-27 ENCOUNTER — TELEPHONE (OUTPATIENT)
Dept: ONCOLOGY | Age: 78
End: 2023-01-27

## 2023-01-27 NOTE — TELEPHONE ENCOUNTER
Patient was seen by Dr Sukhwinder Reaves on Tuesday. She prescribed Augmentin for him due to mouth sores and eye drainage. He called today stating his mouth is still extremely sore and not any better. He wants to know if something else can be sent in.

## 2023-01-27 NOTE — PROGRESS NOTES
[address] CANCER [address] [place] [address]  Dept: [phone]  Loc: [phone]     Gail Ayon  1945   No ref. provider found   WILLIAN Sprague - GE       Gail Ayon is a 68 y.o.  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 Patient developed gross hematuria and flank pain. He had an emergency department encounter for hematuria that had been ongoing for 3 months. He had already been seen by urology who recommended cystoscopy and CAT scan but the patient did not want to do this testing because it was too invasive and he wanted \"minor fixes\". He believes that he had a yeast infection that requires treatment. He wants treatment with nystatin and refuses to return to the urologist.  He understands that his symptoms may relate to bladder cancer or kidney cancer. He has had 2 pound increase in weight. He also had diarrhea. He had been a smoker of 2 packs of cigarettes a day. The patient's urinalysis showed no yeast he had gross hematuria with pyuria and bacteria. He was told to go directly to his urologist.    12/20/2018. Seen by Dr. Dyan Foreman CT urogram had been performed which showed that the right ureter was displaced posteriorly by bladder diverticulum can staining a solid enhancing soft tissue mass in addition to a large calcification measuring 2.3 cm. The right lateral and posterior bladder wall was irregularly thickened highly concerning for neoplasm. There is also a calculus in the posterior dependent portion of the bladder. The bladder wall was mildly irregular. The prostate gland was normal in size and contain calcifications. There were no suspicious bone lesions. Cystoscopy was performed which confirmed that there was a diverticulum which had a bladder stone and a bladder tumor inside of it and another stone in the bladder. We recommended TURBT and cystoscopy litholapaxy. 1/4/2019. CT scan of the chest confirmed no mediastinal hilar adenopathy or pulmonary lesions except for an 5 mm noncalcified nodule in the upper aspect of the right middle lobe. There are no suspicious bony lesions. 1/30/2019. The patient had transurethral resection of bladder tumor and cystolitholapaxy. Pathology showed invasive high-grade urothelial carcinoma, clear-cell variant. Deep smooth muscle was present and was involved by carcinoma flat urothelial carcinoma in situ is also seen. Lymphovascular invasion was not seen. 2/19/2019. Referred to Dr. Carmine Jones neoadjuvant chemotherapy before port bladder resection. Patient was absolutely opposed to having bladder surgery. He agreed to concurrent chemotherapy and radiation with the understanding that the outcome was inferior to neoadjuvant chemotherapy with surgery. 3/18/2019. Started concurrent chemoradiation which was completed on May 15, 2019. He tolerated the treatment with cisplatin well. 6/17/2019 MRI of the pelvis showed that the mass was smaller than 2.9 x 2.5 x 2.7 m and had previously measured 4.1 x 3.0 x 3.9 cm. There were persistent improved enhancing densities within the fat surrounding the mass which possibly represented lymphangitic spread of carcinoma. No pathologically enlarged lymph nodes were identified. There was stable uniform mild circumferential thickening of the remainder of the urinary bladder wall. August 2019 cystoscopy did not show residual tumor. 1/23/2020. Repeat MRI showed that the diverticulum persisted and the mass within it continue to shrink measuring 2.1 x 1.2 x 1.4 cm with irregularity of the outer margin of the mass. There is no other enhancement of the fat adjacent to the mass. There is mild generalized circumferential thickening of the wall of the urinary bladder and no pathologically enlarged lymph nodes. 3/31/2020. Patient developed recurrent hematuria. He underwent cystoscopy which showed tumor along the rim of the bladder diverticulum measuring 6 cm. Biopsy once again showed invasive high-grade urothelial carcinoma clear-cell variant. Deep smooth muscle was present in the biopsy and was involved by carcinoma. Once again it was recommended to him that he proceed with bladder surgery. The patient refused. He wanted to see if more chemotherapy would help. 4/28/2020. Chemotherapy was given with Gemzar and cisplatin. He tolerated the treatment well denied any peripheral neuropathy. His hematuria stopped after his transurethral resection. 5/5/2020. The patient had a video visit with Dr. Abraham Ortega at which time it was strongly recommended to him that he undergo cystectomy. He refused. They agreed to have cystoscopy after the second round of chemotherapy. He continued his chemotherapy which was complicated by neutropenic fever and drug-induced peripheral neuropathy. 8/25/2020. Surveillance cystoscopy showed no tumors. He had large dystrophic calcifications in the diverticulum. 9/10/2020. CT urogram showed asymmetrically thickened and enhancing bladder wall highly suspicious of neoplasia with mild enlargement of the right kidney showing marked asymmetric enhancement with a differential including pyelonephritis, infiltrating neoplasm and infarction. Dr. Abraham Ortega discussed scheduling a right ureteroscopy with possible ureteral biopsy and stent placement but the patient was adamant about not having this done    10/4/2022. The patient did not seek medical attention again until this time at which he wanted evaluation with a CAT scan of the chest abdomen pelvis.   This showed recurrent bladder cancer with marked thickening of the right bladder wall and soft tissue mass of the right ureterovesical junction with possible involvement of the distal right ureter with resultant severe right hydroureteronephrosis. There was an exophytic soft tissue portion of this mass which measured 4.2 x 3.6 cm. Pathologically enlarged lymph nodes were seen. Atherosclerosis was seen. No suspicious bony lesions were seen. CAT scan of the chest showed doubly a new 4 mm nodule along the left major fissure and decreased size of a prominent right hilar lymph node since the prior examination from 2020. There is a small pericardial effusion measuring at most 4 mm which was new. October 2022. Mr. Sindy Agarwal had decreased appetite and had weight loss. He had abdominal pain that was not intense. He had no bleeding he said he had no infection or signs of dysuria. He had an antibiotic at home which she would take occasionally when he thought that he needed it. He denied headache and chest pain bone pain these reasons he sought medical attention. 10/31/2022. Mr. Sindy Agarwal was seen by Dr. MARROQUIN [facility] and had TURBT. The bladder outlet was severely occlusive secondary to by lobar prostatic hypertrophy. There was evidence of a large papillary lesion suggestive of malignancy covering the right lateral wall and obscuring the right ureteral orifice. Attempts to identify the ureteral orifice failed. There were multiple bladder stones along with 2 stones embedded in a diverticulum in the right lateral wall. 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 Lymphovascular invasion was not identified. 11/9/2022. The patient had called the office on 11/1/2022 with complaints of urinary frequency and his postvoid residual was elevated at nearly 400 cc. He was offered a Gutierrez and he declined. He was advised to take Flomax. He also was taking the antibiotic that he had at home the name of which she could not recall.   He did admit to dysuria but he said that this was longstanding and he denied gross hematuria but said that his urine was brown. Once again cystectomy with diversion was recommended and the patient refused. He chose to come to see us to discuss further medical treatment. He also refused nephrostomy tubes. Comorbidities include anxiety and depression, diabetes, glaucoma, hyperlipidemia, hypothyroidism, retinal detachment of the left eye since childhood, large hiatal hernia, continued smoking with a greater than 50-pack-year history of smoking. 11/21/2022. Mr. Bronwyn Callahan comes into the office today to discuss further medical treatment for his invasive bladder cancer. He discussed that immunotherapy would be the next intervention that we could try as he refuses cystectomy and has progressed through cisplatin containing therapy. He is interested in this. He has had poor appetite with weight loss. He denies headache bone pain chest pain but does have abdominal pain especially in his low mid abdomen which she describes as not intense. He says that he has not been bleeding since he had his TURBT.     12/2022. Mr. Bronwyn Callahan has been evaluated with CT scans which shows that he has a nodule in his chest which is PET avid. He says that he has had nodules in his chest for many years and he is not interested in going forward with a biopsy. Reviewed his CAT scans and his PET scans with him. He expressed that the FDG avid right upper lobe nodule that was said to be highly suspicious for malignancy is not very \"hot\". I told him that the radiologist felt that this was highly suspicious for malignancy. He refused further intervention. He is interested in going forward with immunotherapy. This treatment plan has been approved. He is a candidate for immunotherapy because he has already had cisplatin and refuses cystectomy.   By this recent CAT scan and PET scan he may have metastatic disease to the lung or he may indeed have a new lung primary. He refuses to have a biopsy at this time. We reviewed some of the major toxicities of immunotherapy and how important it is to keep all the lines of communication so that we can remediate any toxicity that he may have. He verbalized understanding. He will have more chemotherapy teaching today. He has not been feeling well in the past several weeks. He had a bad cough productive of thick sputum which she describes as whitish-gray without any blood in it. Denies any fevers chills or sweats. Afebrile today. He has lost another 6 pounds since November 21. He denies any history of autoimmune diseases. It is noted that he is diabetic and has had hypothyroidism. 1/3/2023. Mr. Amilcar Braga returns to the office to continue his treatment with pembrolizumab for his bladder cancer. He has refused cystectomy. He has lost 2 pounds since last time I saw him 3 weeks ago. Noted brown urine which probably means that there is old blood in his urine. He has had no other bleeding. He is not very hungry. We talked about ways to summarize the protein in his diet and he knows that it is his job to eat despite the fact that he does not really want to. He has had pain in his right knee since before he started on pembrolizumab he takes Tylenol for it and there is an arthritis cream that he rubs into his right knee as well he says that this helps for several hours. He has had no problems with constipation diarrhea or rashes. He lives alone. I encouraged him to set up a  system with his nephew where he calls him to check in as a \"well call\" so that if he does not call, his nephew will check on him. INTERVAL NOTE    1/24/2023. Mr. Amilcar Braga comes into the office today for his treatment for his bladder cancer.   He decided not to get his blood drawn because he is suffering from symptoms consistent with urinary tract infection and also an infection/inflammation in his eye on the right side with matting of his eyelids, pinkeye, sores in his mouth and sores in his throat. I told him that we will hold his treatment today but he needs a throat culture and urine culture. He says that he can give us a urine although he dribbles a lot and is not sure how much urine he can actually give at 1 time. We are told today that we cannot do a throat culture here but he must go to an urgent care center to have this done. He expressed that he is not sure that he will do this. With the symptoms I have started him on Augmentin. Mr. Jess Friedman is not doing well. He has lost 7 pounds. He denies fevers, chills, sweats, bleeding, nausea and vomiting constipation and diarrhea. His appetite is not good. He continues to lose in an inordinate amount of weight. His pants are cinched tight around his waist.  I expressed to him that I am concerned about him living alone. Once again he says that his nephew is in contact with him. He is very concerned because this is his good eye. He is blind in his left eye. He says that he could not get into his primary care provider. MONITORING PARAMETERS    Physical examinations, CAT scans, PET scans    PAST MEDICAL HISTORY  Past Medical History:   Diagnosis Date    Anxiety     Cancer ([facility] [address]     bladder chemo    Cataract     LEFT EYE    Diabetes mellitus ([facility] [address]     Glaucoma     LEFT EYE    History of hematuria     Hyperlipidemia     Hypothyroidism     Retinal detachment of left eye with multiple breaks     SINCE CHILDHOOD    Thyroid disease     Type 2 diabetes mellitus without complication (HCC)         REVIEW OF SYSTEMS  Review of Systems   Constitutional:  Positive for appetite change and fatigue. Negative for chills, diaphoresis and fever. HENT:  Positive for rhinorrhea and sore throat. Negative for dental problem, hearing loss, mouth sores and nosebleeds. Eyes:  Positive for redness. Respiratory:  Positive for shortness of breath. Negative for cough. Cardiovascular:  Negative for leg swelling. Gastrointestinal:  Positive for nausea. Negative for abdominal pain, constipation, diarrhea and vomiting. Genitourinary:  Negative for dysuria, hematuria and urgency. Musculoskeletal:  Positive for myalgias. Skin:  Positive for pallor. Neurological:  Negative for weakness. Hematological:  Does not bruise/bleed easily. Psychiatric/Behavioral:  Positive for sleep disturbance. Negative for self-injury and suicidal ideas. The patient is not nervous/anxious.        FAMILY HISTORY  Family History   Problem Relation Age of Onset    Other Mother         BRAIN TUMOR    Stroke Mother     Heart Disease Father     High Blood Pressure Father     Cancer Sister     Diabetes Neg Hx         SOCIAL HISTORY  Social History     Socioeconomic History    Marital status:      Spouse name: Not on file    Number of children: Not on file    Years of education: Not on file    Highest education level: Not on file   Occupational History    Not on file   Tobacco Use    Smoking status: Every Day     Packs/day: 1.00     Years: 53.00     Pack years: 53.00     Types: Cigarettes    Smokeless tobacco: Never    Tobacco comments:     12/12/22 stated depending on tumor analysis, however still not ready to quit - refused resources   Vaping Use    Vaping Use: Never used   Substance and Sexual Activity    Alcohol use: No     Comment: not in 40 years    Drug use: No    Sexual activity: Not on file   Other Topics Concern    Not on file   Social History Narrative    Not on file     Social Determinants of Health     Financial Resource Strain: Not on file   Food Insecurity: Not on file   Transportation Needs: Not on file   Physical Activity: Not on file   Stress: Not on file   Social Connections: Not on file   Intimate Partner Violence: Not on file   Housing Stability: Not on file        CURRENT MEDICATIONS  Current Outpatient Medications   Medication Sig Dispense Refill amoxicillin-clavulanate (AUGMENTIN) 875-125 MG per tablet Take 1 tablet by mouth 2 times daily for 10 days 20 tablet 0    ondansetron (ZOFRAN) 4 MG tablet Take 1 tablet by mouth every 8 hours as needed for Nausea or Vomiting 20 tablet 1    prochlorperazine (COMPAZINE) 10 MG tablet Take 1 tablet by mouth every 6 hours as needed (for nausea) 60 tablet 1    tamsulosin (FLOMAX) 0.4 MG capsule Take 1 capsule by mouth daily 90 capsule 3    linagliptin (TRADJENTA) 5 MG tablet Take 5 mg by mouth daily      Multiple Vitamins-Minerals ([employer] SPECTRAVITE ADULT 50+ PO) Take by mouth      Saw Palmetto 450 MG CAPS Take by mouth daily Indications: 3-4 times per week PRN       atorvastatin (LIPITOR) 40 MG tablet Take 40 mg by mouth daily      metFORMIN (GLUCOPHAGE) 500 MG tablet Take 500 mg by mouth 4 times daily       levothyroxine (SYNTHROID) 125 MCG tablet Take 125 mcg by mouth Daily      prednisoLONE acetate (PRED FORTE) 1 % ophthalmic suspension 1 drop 4 times daily      timolol (BETIMOL) 0.5 % ophthalmic solution 1 drop 2 times daily      brimonidine (ALPHAGAN) 0.2 % ophthalmic solution 1 drop 3 times daily      cyclopentolate (CYCLOGYL) 1 % ophthalmic solution 1 drop once      erythromycin (ROMYCIN) 5 MG/GM ophthalmic ointment apply to the left eye twice a day 1 g 1     No current facility-administered medications for this visit. OARRS    PDMP Monitoring:    Last PDMP Sonu Cortes as Reviewed [facility]):  Review User Review Instant Review Result   Aliya Grullon 11/27/2022 11:02 PM Reviewed PDMP [1]     Last Controlled Substance Monitoring Documentation      Flowsheet Row Admission (Discharged) from 1/30/2019 in [facility] The Prescription Monitoring Report for this patient was reviewed today. filed at 01/31/2019 1255   Periodic Controlled Substance Monitoring No signs of potential drug abuse or diversion identified. filed at 01/31/2019 1255          Urine Drug Screenings (1 yr)    No resulted procedures found. Medication Contract and Consent for Opioid Use Documents Filed        No documents found                     PHYSICAL EXAM    Physical Exam  Vitals (Blood pressure today is 157/78) and nursing note reviewed. Exam conducted with a chaperone present. Constitutional:       General: He is not in acute distress. Appearance: He is ill-appearing and toxic-appearing. He is not diaphoretic. Comments: Mr. Meagan Velasquez is a chronically ill-appearing and somewhat acutely ill-appearing  male with inflammation of his right eye including his eyelid and his conjunctiva with matting of his eyelids. He has dense opacity over his left eye and is blind in that eye. He has inflammation of his mouth with erythema. There is no evidence of thrush. He admits soreness of his mouth and throat. HENT:      Head: Normocephalic. Comments: He has bilateral temporal wasting     Mouth/Throat:      Pharynx: Oropharyngeal exudate and posterior oropharyngeal erythema present. Comments: Erythematous patches in his buccal mucosa. I do not see evidence of thrush. Eyes:      Comments: Erythema and discharge around his right eye. Dense opacity over the left eye with blindness   Neck:      Comments: Small grainy lymph nodes on both sides of his neck worse on the left than on the right. Cardiovascular:      Rate and Rhythm: Normal rate and regular rhythm. Pulmonary:      Effort: No respiratory distress. Breath sounds: No wheezing, rhonchi or rales. Comments: Breath sounds are decreased throughout. He has his pack of cigarettes in his left chest pocket. Chest:      Chest wall: No tenderness. Abdominal:      General: There is no distension. Palpations: Abdomen is soft. There is no mass. Tenderness: There is no abdominal tenderness. There is no guarding or rebound. Musculoskeletal:      Cervical back: Normal range of motion and neck supple. Neurological:      Mental Status: He is alert. ECOG STATUS    2:  Ambulatory and capable of all self-care but unable to carry out any work activities: up and about more than 50% of waking hours    LABS/IMAGING    Urine culture and throat culture were requested. PATHOLOGY/GENETICS    Recurrent papillary transitional cell bladder cancer with pulmonary nodules        ASSESSMENT and PLAN    1. Recurrent papillary transitional cell carcinoma of the bladder with pulmonary nodules. No treatment was given today due to his intercurrent infection. 2.  Symptoms of urinary tract infection and symptoms of upper respiratory tract infection with oropharyngeal erythema, no evidence of thrush, inflammation of his right eye with pinkeye and matting. Urinary culture was requested as well as urinalysis. Throat culture was ordered. I started him on Augmentin pending the results of the culture. His eye and throat inflammation could be viral.  He verbalized he may not be able to stop to get the culture performed. We cannot do that here because of lack of culturettes. 3.  Social barriers to care. He lives alone. I strongly encouraged him to have a system with his family so that he has someone checking on him. He said that he has a relationship with his nephew. 4.  Worsening anemia. He did not have his laboratory data today. He has had brown urine signifying old blood. 5.  COPD with continued smoking. I strongly encouraged him to be careful so that he does not have accidents with his cigarettes. He has no desire to quit. 6.  Hydronephrosis. We will monitor this. 7.  Multiple comorbidities. The patient has anxiety, diabetes, hyperlipidemia, hypothyroidism, glaucoma and massive weight loss. He is resistant to a lot of intervention. I have encouraged him to call for help as he needs it. He knows to call if he has any change in his status, questions or concerns.         Sammi Bhakta MD 1/27/2023 9:31 AM

## 2023-01-27 NOTE — TELEPHONE ENCOUNTER
Patient called and said his medication that was given to him from Tuesday is not working his mouth is still infected. Patient said he can't eat only drink.     Please advise

## 2023-01-29 ENCOUNTER — HOSPITAL ENCOUNTER (EMERGENCY)
Age: 78
Discharge: HOME | End: 2023-01-29
Attending: FAMILY MEDICINE
Payer: MEDICARE

## 2023-01-29 VITALS
RESPIRATION RATE: 18 BRPM | HEIGHT: 67 IN | DIASTOLIC BLOOD PRESSURE: 95 MMHG | HEART RATE: 98 BPM | BODY MASS INDEX: 21.97 KG/M2 | TEMPERATURE: 97.9 F | SYSTOLIC BLOOD PRESSURE: 112 MMHG | WEIGHT: 140 LBS | OXYGEN SATURATION: 98 %

## 2023-01-29 DIAGNOSIS — E86.0 DEHYDRATION, MILD: Primary | ICD-10-CM

## 2023-01-29 DIAGNOSIS — B37.0 THRUSH OF MOUTH AND ESOPHAGUS (HCC): ICD-10-CM

## 2023-01-29 DIAGNOSIS — B37.81 THRUSH OF MOUTH AND ESOPHAGUS (HCC): ICD-10-CM

## 2023-01-29 DIAGNOSIS — H10.31 ACUTE CONJUNCTIVITIS OF RIGHT EYE, UNSPECIFIED ACUTE CONJUNCTIVITIS TYPE: ICD-10-CM

## 2023-01-29 LAB
ALBUMIN SERPL BCG-MCNC: 3.4 G/DL (ref 3.5–5.1)
ALP SERPL-CCNC: 94 U/L (ref 38–126)
ALT SERPL W/O P-5'-P-CCNC: 9 U/L (ref 11–66)
ANION GAP SERPL CALC-SCNC: 14 MEQ/L (ref 8–16)
AST SERPL-CCNC: 14 U/L (ref 5–40)
BACTERIA URNS QL MICRO: ABNORMAL /HPF
BASOPHILS ABSOLUTE: 0 THOU/MM3 (ref 0–0.1)
BASOPHILS NFR BLD AUTO: 0.6 %
BILIRUB CONJ SERPL-MCNC: < 0.2 MG/DL (ref 0–0.3)
BILIRUB SERPL-MCNC: 0.6 MG/DL (ref 0.3–1.2)
BILIRUB UR QL STRIP.AUTO: NEGATIVE
BUN SERPL-MCNC: 37 MG/DL (ref 7–22)
CALCIUM SERPL-MCNC: 10.2 MG/DL (ref 8.5–10.5)
CASTS #/AREA URNS LPF: ABNORMAL /LPF
CASTS 2: ABNORMAL /LPF
CHARACTER UR: ABNORMAL
CHLORIDE SERPL-SCNC: 99 MEQ/L (ref 98–111)
CO2 SERPL-SCNC: 25 MEQ/L (ref 23–33)
COLOR: YELLOW
CREAT SERPL-MCNC: 1.7 MG/DL (ref 0.4–1.2)
CRYSTALS URNS MICRO: ABNORMAL
DEPRECATED RDW RBC AUTO: 53 FL (ref 35–45)
EOSINOPHIL NFR BLD AUTO: 1.6 %
EOSINOPHILS ABSOLUTE: 0.1 THOU/MM3 (ref 0–0.4)
EPITHELIAL CELLS, UA: ABNORMAL /HPF
ERYTHROCYTE [DISTWIDTH] IN BLOOD BY AUTOMATED COUNT: 16.4 % (ref 11.5–14.5)
GFR SERPL CREATININE-BSD FRML MDRD: 41 ML/MIN/1.73M2
GLUCOSE SERPL-MCNC: 139 MG/DL (ref 70–108)
GLUCOSE UR QL STRIP.AUTO: NEGATIVE MG/DL
HCT VFR BLD AUTO: 40.1 % (ref 42–52)
HGB BLD-MCNC: 12.8 GM/DL (ref 14–18)
HGB UR QL STRIP.AUTO: ABNORMAL
IMM GRANULOCYTES # BLD AUTO: 0.03 THOU/MM3 (ref 0–0.07)
IMM GRANULOCYTES NFR BLD AUTO: 0.4 %
KETONES UR QL STRIP.AUTO: 15
LACTIC ACID, SEPSIS: 1.6 MMOL/L (ref 0.5–1.9)
LACTIC ACID, SEPSIS: 2.6 MMOL/L (ref 0.5–1.9)
LYMPHOCYTES ABSOLUTE: 0.9 THOU/MM3 (ref 1–4.8)
LYMPHOCYTES NFR BLD AUTO: 11.5 %
MCH RBC QN AUTO: 28.8 PG (ref 26–33)
MCHC RBC AUTO-ENTMCNC: 31.9 GM/DL (ref 32.2–35.5)
MCV RBC AUTO: 90.1 FL (ref 80–94)
MISCELLANEOUS 2: ABNORMAL
MONOCYTES ABSOLUTE: 0.6 THOU/MM3 (ref 0.4–1.3)
MONOCYTES NFR BLD AUTO: 6.8 %
NEUTROPHILS NFR BLD AUTO: 79.1 %
NITRITE UR QL STRIP: NEGATIVE
NRBC BLD AUTO-RTO: 0 /100 WBC
OSMOLALITY SERPL CALC.SUM OF ELEC: 286.6 MOSMOL/KG (ref 275–300)
PH UR STRIP.AUTO: 6 [PH] (ref 5–9)
PLATELET # BLD AUTO: 331 THOU/MM3 (ref 130–400)
PMV BLD AUTO: 8.7 FL (ref 9.4–12.4)
POTASSIUM SERPL-SCNC: 3.9 MEQ/L (ref 3.5–5.2)
PROT SERPL-MCNC: 7.4 G/DL (ref 6.1–8)
PROT UR STRIP.AUTO-MCNC: 100 MG/DL
RBC # BLD AUTO: 4.45 MILL/MM3 (ref 4.7–6.1)
RBC URINE: > 100 /HPF
RENAL EPI CELLS #/AREA URNS HPF: ABNORMAL /[HPF]
SEGMENTED NEUTROPHILS ABSOLUTE COUNT: 6.5 THOU/MM3 (ref 1.8–7.7)
SODIUM SERPL-SCNC: 138 MEQ/L (ref 135–145)
SP GR UR REFRACT.AUTO: 1.02 (ref 1–1.03)
UROBILINOGEN, URINE: 0.2 EU/DL (ref 0–1)
WBC # BLD AUTO: 8.2 THOU/MM3 (ref 4.8–10.8)
WBC #/AREA URNS HPF: > 100 /HPF
WBC #/AREA URNS HPF: ABNORMAL /[HPF]
YEAST LIKE FUNGI URNS QL MICRO: ABNORMAL

## 2023-01-29 PROCEDURE — 36415 COLL VENOUS BLD VENIPUNCTURE: CPT

## 2023-01-29 PROCEDURE — 87086 URINE CULTURE/COLONY COUNT: CPT

## 2023-01-29 PROCEDURE — 83605 ASSAY OF LACTIC ACID: CPT

## 2023-01-29 PROCEDURE — 80053 COMPREHEN METABOLIC PANEL: CPT

## 2023-01-29 PROCEDURE — 99284 EMERGENCY DEPT VISIT MOD MDM: CPT

## 2023-01-29 PROCEDURE — 96360 HYDRATION IV INFUSION INIT: CPT

## 2023-01-29 PROCEDURE — 2580000003 HC RX 258: Performed by: FAMILY MEDICINE

## 2023-01-29 PROCEDURE — 82248 BILIRUBIN DIRECT: CPT

## 2023-01-29 PROCEDURE — 6370000000 HC RX 637 (ALT 250 FOR IP): Performed by: FAMILY MEDICINE

## 2023-01-29 PROCEDURE — 81001 URINALYSIS AUTO W/SCOPE: CPT

## 2023-01-29 PROCEDURE — 85025 COMPLETE CBC W/AUTO DIFF WBC: CPT

## 2023-01-29 RX ORDER — FLUCONAZOLE 200 MG/1
200 TABLET ORAL ONCE
Status: COMPLETED | OUTPATIENT
Start: 2023-01-29 | End: 2023-01-29

## 2023-01-29 RX ORDER — 0.9 % SODIUM CHLORIDE 0.9 %
1000 INTRAVENOUS SOLUTION INTRAVENOUS ONCE
Status: COMPLETED | OUTPATIENT
Start: 2023-01-29 | End: 2023-01-29

## 2023-01-29 RX ORDER — TETRACAINE HYDROCHLORIDE 5 MG/ML
2 SOLUTION OPHTHALMIC ONCE
Status: COMPLETED | OUTPATIENT
Start: 2023-01-29 | End: 2023-01-29

## 2023-01-29 RX ORDER — FLUCONAZOLE 100 MG/1
100 TABLET ORAL DAILY
Qty: 7 TABLET | Refills: 0 | Status: SHIPPED | OUTPATIENT
Start: 2023-01-29 | End: 2023-02-05

## 2023-01-29 RX ORDER — TOBRAMYCIN AND DEXAMETHASONE 3; 1 MG/ML; MG/ML
2 SUSPENSION/ DROPS OPHTHALMIC
Status: DISCONTINUED | OUTPATIENT
Start: 2023-01-29 | End: 2023-01-29 | Stop reason: HOSPADM

## 2023-01-29 RX ADMIN — SODIUM CHLORIDE 1000 ML: 9 INJECTION, SOLUTION INTRAVENOUS at 11:52

## 2023-01-29 RX ADMIN — TOBRAMYCIN AND DEXAMETHASONE 2 DROP: 3; 1 SUSPENSION/ DROPS OPHTHALMIC at 14:36

## 2023-01-29 RX ADMIN — FLUCONAZOLE 200 MG: 200 TABLET ORAL at 11:43

## 2023-01-29 RX ADMIN — TETRACAINE HYDROCHLORIDE 2 DROP: 5 SOLUTION OPHTHALMIC at 11:52

## 2023-01-29 ASSESSMENT — PAIN - FUNCTIONAL ASSESSMENT
PAIN_FUNCTIONAL_ASSESSMENT: 0-10

## 2023-01-29 ASSESSMENT — PAIN SCALES - GENERAL
PAINLEVEL_OUTOF10: 3
PAINLEVEL_OUTOF10: 3
PAINLEVEL_OUTOF10: 2

## 2023-01-29 NOTE — ED NOTES
Pt resting in bed, pt denies any needs. Call light within reach.       Lady Ortiz, JIMMY  01/29/23 [id number]

## 2023-01-29 NOTE — ED NOTES
Dr. Valentino Pin at bedside updating patient on poc. Pt denies any needs at this time.       Hema Barrientos RN  01/29/23 [id number]

## 2023-01-29 NOTE — ED PROVIDER NOTES
EMERGENCY DEPARTMENT ENCOUNTER      CHIEF COMPLAINT    Chief Complaint   Patient presents with    Mouth Lesions    Eye Problem       HPI    Debbie Spivey is a 68 y.o. male with chronic bladder cancer on immunotherapy, who presents with generalized weakness and concerns with dry mouth, chapped lip with decreased PO intake due to burning sensation in his mouth and throat, also red right eye since yesterday. He denies chest pain, sob on exertion since the onset of these symptoms. He is especially worried about his right eye, denying any trauma or prior scratch or foreign body to the eye. The duration has been constant since the onset, and progressing over the past few days days. The generalized dehydration was not associated with fever, hemoptysis, neck swelling. He endorses not feeling well and wants to be evaluated for both the mouth lesions and eye issue. No aggravating or alleviating factors. Moreover, he denies any dysuria or signs of hematuria or UTI. REVIEW OF SYSTEMS    ENT: very dry mouth, chapped lower lips, red eye (right)  Respiratory: No shortness of breath or new cough  General: No weight loss or night sweats   See HPI for further details. All other systems reviewed and are negative.     PAST MEDICAL OR SURGICAL HISTORY    Past Medical History:   Diagnosis Date    Anxiety     Cancer ([address]     bladder chemo    Cataract     LEFT EYE    Diabetes mellitus ([address]     Glaucoma     LEFT EYE    History of hematuria     Hyperlipidemia     Hypothyroidism     Retinal detachment of left eye with multiple breaks     SINCE CHILDHOOD    Thyroid disease     Type 2 diabetes mellitus without complication ([facility] [address]      Past Surgical History:   Procedure Laterality Date    CYSTOSCOPY N/A 01/30/2019    TRANSURETHRAL RESECTION BLADDER TUMOR, CYSTOLITHOLAPAXY performed by Mumtaz Cordero MD at [id number] N/A 03/31/2020    CYSTOSCOPY WITH TRANSURETHRAL RESECTION OF BLADDER TUMOR REMOVAL OF BLADDER STONE performed by Maile Cheng MD at [address] [facility]      arm as kid    TONSILLECTOMY      TRANSURETHRAL RESECTION OF BLADDER TUMOR  10/31/2022    Removal of bladder stones       CURRENT MEDICATIONS    Current Outpatient Rx   Medication Sig Dispense Refill    fluconazole (DIFLUCAN) 100 MG tablet Take 1 tablet by mouth daily for 7 days 7 tablet 0    Magic Mouthwash (MIRACLE MOUTHWASH) Swish and spit 5 mLs 4 times daily as needed for Irritation 1:1:1:1 nystatin, maalox, viscous lidocaine, benadryl 200 mL 0    amoxicillin-clavulanate (AUGMENTIN) 875-125 MG per tablet Take 1 tablet by mouth 2 times daily for 10 days 20 tablet 0    ondansetron (ZOFRAN) 4 MG tablet Take 1 tablet by mouth every 8 hours as needed for Nausea or Vomiting 20 tablet 1    prochlorperazine (COMPAZINE) 10 MG tablet Take 1 tablet by mouth every 6 hours as needed (for nausea) 60 tablet 1    tamsulosin (FLOMAX) 0.4 MG capsule Take 1 capsule by mouth daily 90 capsule 3    erythromycin (ROMYCIN) 5 MG/GM ophthalmic ointment apply to the left eye twice a day 1 g 1    linagliptin (TRADJENTA) 5 MG tablet Take 5 mg by mouth daily      Multiple Vitamins-Minerals ([employer] SPECTRAVITE ADULT 50+ PO) Take by mouth      Saw Palmetto 450 MG CAPS Take by mouth daily Indications: 3-4 times per week PRN       atorvastatin (LIPITOR) 40 MG tablet Take 40 mg by mouth daily      metFORMIN (GLUCOPHAGE) 500 MG tablet Take 500 mg by mouth 4 times daily       levothyroxine (SYNTHROID) 125 MCG tablet Take 125 mcg by mouth Daily      prednisoLONE acetate (PRED FORTE) 1 % ophthalmic suspension 1 drop 4 times daily      timolol (BETIMOL) 0.5 % ophthalmic solution 1 drop 2 times daily      brimonidine (ALPHAGAN) 0.2 % ophthalmic solution 1 drop 3 times daily      cyclopentolate (CYCLOGYL) 1 % ophthalmic solution 1 drop once         ALLERGIES    No Known Allergies    FAMILY OR SOCIAL HISTORY    Family History   Problem Relation Age of Onset    Other Mother         BRAIN TUMOR Stroke Mother     Heart Disease Father     High Blood Pressure Father     Cancer Sister     Diabetes Neg Hx      Social History     Socioeconomic History    Marital status:      Spouse name: Not on file    Number of children: Not on file    Years of education: Not on file    Highest education level: Not on file   Occupational History    Not on file   Tobacco Use    Smoking status: Every Day     Packs/day: 1.00     Years: 53.00     Pack years: 53.00     Types: Cigarettes    Smokeless tobacco: Never    Tobacco comments:     12/12/22 stated depending on tumor analysis, however still not ready to quit - refused resources   Vaping Use    Vaping Use: Never used   Substance and Sexual Activity    Alcohol use: No     Comment: not in 40 years    Drug use: No    Sexual activity: Not on file   Other Topics Concern    Not on file   Social History Narrative    Not on file     Social Determinants of Health     Financial Resource Strain: Not on file   Food Insecurity: Not on file   Transportation Needs: Not on file   Physical Activity: Not on file   Stress: Not on file   Social Connections: Not on file   Intimate Partner Violence: Not on file   Housing Stability: Not on file       I reviewed patient's vital signs and all pertinent nursing notes, and am in agreement. I also reviewed patient's social, medical and surgical histories. PHYSICAL EXAM    VITAL SIGNS: BP (!) 112/95   Pulse 98   Temp 97.9 °F (36.6 °C) (Oral)   Resp 18   Ht 5' 7\" (1.702 m)   Wt 140 lb (63.5 kg)   SpO2 98%   BMI 21.93 kg/m²   Constitutional:  Well developed, well nourished, no acute distress  Eyes:  Pupil reactive in right eye, with yellow drainage and mild conjunctival injection. Fluorecein stain negative for ulcers or uptake in cornea. No proptosis noted to right eye. Left eye has longstanding cataract (legally blind).   HENT:  atraumatic, dry mucous membranes, scattered MCP rash on both sides of face; very chapped lips with signs of thrush in oral cavity. NECK: Normal range of motion, no JVD  Respiratory:  No respiratory distress, normal breath sounds, no wheezing   Cardiovascular:  normal rate, normal rhythm, no murmurs   Integument:  Skin is warm and dry, no obvious rash except where noted on face  Neurologic: awake, alert, oriented x3, handgrip is 5/5 bilaterally, normal sensory function, normal gait      Labs Reviewed   CBC WITH AUTO DIFFERENTIAL - Abnormal; Notable for the following components:       Result Value    RBC 4.45 (*)     Hemoglobin 12.8 (*)     Hematocrit 40.1 (*)     MCHC 31.9 (*)     RDW-CV 16.4 (*)     RDW-SD 53.0 (*)     MPV 8.7 (*)     Lymphocytes Absolute 0.9 (*)     All other components within normal limits   BASIC METABOLIC PANEL - Abnormal; Notable for the following components:    Glucose 139 (*)     BUN 37 (*)     Creatinine 1.7 (*)     All other components within normal limits   HEPATIC FUNCTION PANEL - Abnormal; Notable for the following components:    Albumin 3.4 (*)     ALT 9 (*)     All other components within normal limits   LACTATE, SEPSIS - Abnormal; Notable for the following components:    Lactic Acid, Sepsis 2.6 (*)     All other components within normal limits   GLOMERULAR FILTRATION RATE, ESTIMATED - Abnormal; Notable for the following components:    Est, Glom Filt Rate 41 (*)     All other components within normal limits   URINE WITH REFLEXED MICRO - Abnormal; Notable for the following components:    Ketones, Urine 15 (*)     Blood, Urine LARGE (*)     Protein,  (*)     Leukocyte Esterase, Urine LARGE (*)     Character, Urine TURBID (*)     All other components within normal limits   CULTURE, REFLEXED, URINE   CULTURE, URINE   ANION GAP   OSMOLALITY   GROUP A STREP, REFLEX   LACTATE, SEPSIS       RADIOLOGY/PROCEDURES    No orders to display       INITIAL IMPRESSION:  Moderate dehydration with chapped lips and dry mouth, signs of thrush.  Also unrelated conjunctivitis of right eye without any signs of corneal ulcers. Low suspicion for shingles based on MCP rash on both sides of face and chin. No toxicity noted, will benefit from IV fluid hydration. PLAN  I ordered labs to assess for any signs of dehydration, renal insufficiencies. Fluorecein staining of right eye will be performed to rule out corneal ulcers. Pertinent Labs & Imaging studies reviewed and interpreted. (See chart for details)  See chart for details of medications given during the ED stay. Vitals:    01/29/23 1115 01/29/23 1153 01/29/23 1247 01/29/23 1334   BP: 118/70 115/64 100/61 (!) 112/95   Pulse: 87 70  98   Resp: 19 18 18 18   Temp:       TempSrc:       SpO2: 94% 96% 98% 98%   Weight:       Height:           Differential diagnosis: Dehydration, potentially/metabolic problem, anemia, infection, conjunctivitis, rash, other    FINAL DIAGNOSES:    1. Dehydration, mild    2. Acute conjunctivitis of right eye, unspecified acute conjunctivitis type    3. Thrush of mouth and esophagus [facility])        ED COURSE & MEDICAL DECISION MAKING    68yo male with stable bladder cancer, on immunotherapy, presents with a right red eye and thrush/mouth lesions, found to be dehydrated due to this condition. Separately his right eye has signs of conjunctivitis without any signs of corneal ulcers. His rash on the face is not dermatomal in nature, thus not shingles (zoster ophthalmicus). He has no airway compromise or signs of toxicity. This is an acute issue (issues) with no obvious prognosis. I ordered labs (CBC, BMP, UA) to rule out an infection. Pt improved after a liter of fluids for his dehydration. I ordered a diflucan for his thrush. I also reviewed his labs with patient. His elevated lactate of 2.6 is of unknown etiology; certainly we will send his UA for culture, in case it grows out, but will withhold antibiotics for now. Overall, pt is non-toxic and is amenable for discharge.  I referred him to ophthalmology to follow up with his conjunctivitis and diflucan for the thrush, with strict return instructions. Rx ordered for him (diflucan and tobradex).     FINAL DISPOSITION  Admit       Jonnie Zapien MD  01/29/23 3093

## 2023-01-29 NOTE — ED TRIAGE NOTES
Pt presents to the ed with c/o mouth lesions and eye drainage of the right eye. Pt states that he had the issues and told the cancer doctor about it . They prescribed abts, pt states that he was not getting better and called them back, they then prescribed mouthwash. PT states relief for about 10 minutes. PT states 3/10 pain at this time.

## 2023-01-29 NOTE — DISCHARGE INSTRUCTIONS
YOU WERE GIVEN A LITER OF IV FLUID FOR YOUR DEHYDRATION DUE TO THRUSH. YOU WERE ALSO GIVEN AN ANTIBIOTIC EYEDROP FOR YOUR RIGHT EYE, WHICH HAS AN INFECTION. APPLY TWO DROPS OF TOBRADEX EVERY 4 HOURS FOR NEXT FIVE DAYS. CALL THE OPHTHAMOLOGIST DR. STARR THIS WEEK IF YOUR EYE IS NOT GETTING BETTER. TAKE DIFLUCAN DAILY FOR ONE WEEK TO CLEAR THE YEAST INFECTION IN YOUR MOUTH (THRUSH).

## 2023-01-31 LAB — BACTERIA UR CULT: NORMAL

## 2023-02-02 ENCOUNTER — TELEPHONE (OUTPATIENT)
Dept: ONCOLOGY | Age: 78
End: 2023-02-02

## 2023-02-02 NOTE — TELEPHONE ENCOUNTER
Just an FYI patient was seen by PCP today, he still has the sores in his mouth, thrush  and now has sores on his face as well his PCP is treating him with an abx for a bacterial infection for the face sores.  Comes to see you 2/16

## 2023-02-09 ASSESSMENT — ENCOUNTER SYMPTOMS
DIARRHEA: 0
EYE REDNESS: 1
ABDOMINAL PAIN: 0
SORE THROAT: 1
RHINORRHEA: 1
NAUSEA: 1
SHORTNESS OF BREATH: 1
COUGH: 0
VOMITING: 0
CONSTIPATION: 0

## 2023-02-16 DIAGNOSIS — C67.8 MALIGNANT NEOPLASM OF OVERLAPPING SITES OF BLADDER (HCC): Primary | ICD-10-CM

## 2023-02-21 ENCOUNTER — TELEPHONE (OUTPATIENT)
Dept: ONCOLOGY | Age: 78
End: 2023-02-21

## (undated) DEVICE — Z INACTIVE USE 2660664 SOLUTION IRRIG 3000ML 0.9% SOD CHL USP UROMATIC PLAS CONT

## (undated) DEVICE — CATHETER URETH 22FR 30CC BLLN F 3 W SPEC M RND TIP TWO

## (undated) DEVICE — GLOVE ORANGE PI 7 1/2   MSG9075

## (undated) DEVICE — CATHETER URETH 24FR BLLN 30CC STD LTX 3 W TWO OPP DRNGE EYE

## (undated) DEVICE — ELECTRODE PT RET AD L9FT HI MOIST COND ADH HYDRGEL CORDED

## (undated) DEVICE — DRAINBAG,ANTI-REFLUX TOWER,L/F,2000ML,LL: Brand: MEDLINE

## (undated) DEVICE — SOLUTION SCRB 4OZ 4% CHG H2O AIDED FOR PREOPERATIVE SKIN

## (undated) DEVICE — TUBING, SUCTION, 1/4" X 20', STRAIGHT: Brand: MEDLINE INDUSTRIES, INC.

## (undated) DEVICE — YANKAUER,BULB TIP,W/O VENT,RIGID,STERILE: Brand: MEDLINE

## (undated) DEVICE — SYRINGE CATH TIP 50ML

## (undated) DEVICE — PAD,ABDOMINAL,5"X9",ST,LF,25/BX: Brand: MEDLINE INDUSTRIES, INC.

## (undated) DEVICE — SOLUTION IV IRRIG POUR BRL 0.9% SODIUM CHL 2F7124

## (undated) DEVICE — GUIDEWIRE ENDOSCP L150CM DIA0.035IN TIP 3CM PTFE NIT

## (undated) DEVICE — SOLUTION IV IRRIG WATER 1000ML POUR BRL 2F7114

## (undated) DEVICE — INTENDED FOR TISSUE SEPARATION, AND OTHER PROCEDURES THAT REQUIRE A SHARP SURGICAL BLADE TO PUNCTURE OR CUT.: Brand: BARD-PARKER ® CARBON RIB-BACK BLADES

## (undated) DEVICE — HF-RESECTION ELECTRODE PLASMALOOP LOOP, MEDIUM, 24 FR., 12°-30°, ESG TURIS: Brand: OLYMPUS

## (undated) DEVICE — SOLUTION IV 1000ML 0.9% SOD CHL PH 5 INJ USP VIAFLX PLAS

## (undated) DEVICE — PLUG,CATHETER,DRAINAGE PROTECTOR,TUBE: Brand: MEDLINE

## (undated) DEVICE — SYRINGE MED 50ML LUERLOCK TIP

## (undated) DEVICE — GOWN,SIRUS,NON REINFRCD,LARGE,SET IN SL: Brand: MEDLINE

## (undated) DEVICE — GLOVE SURG SZ 65 THK91MIL LTX FREE SYN POLYISOPRENE

## (undated) DEVICE — Device

## (undated) DEVICE — 3M™ WARMING BLANKET, UPPER BODY, 10 PER CASE, 42268: Brand: BAIR HUGGER™

## (undated) DEVICE — 35 ML SYRINGE LUER-LOCK TIP: Brand: MONOJECT